# Patient Record
Sex: FEMALE | Race: WHITE | Employment: OTHER | URBAN - NONMETROPOLITAN AREA
[De-identification: names, ages, dates, MRNs, and addresses within clinical notes are randomized per-mention and may not be internally consistent; named-entity substitution may affect disease eponyms.]

---

## 2017-01-13 ENCOUNTER — OFFICE VISIT (OUTPATIENT)
Dept: FAMILY MEDICINE CLINIC | Age: 52
End: 2017-01-13

## 2017-01-13 VITALS
BODY MASS INDEX: 29.78 KG/M2 | SYSTOLIC BLOOD PRESSURE: 122 MMHG | OXYGEN SATURATION: 96 % | WEIGHT: 174.4 LBS | DIASTOLIC BLOOD PRESSURE: 72 MMHG | TEMPERATURE: 97.7 F | HEART RATE: 90 BPM | HEIGHT: 64 IN

## 2017-01-13 DIAGNOSIS — J40 BRONCHITIS: Primary | ICD-10-CM

## 2017-01-13 PROCEDURE — 99213 OFFICE O/P EST LOW 20 MIN: CPT | Performed by: FAMILY MEDICINE

## 2017-01-13 RX ORDER — CEFDINIR 300 MG/1
300 CAPSULE ORAL 2 TIMES DAILY
Qty: 20 CAPSULE | Refills: 0 | Status: SHIPPED | OUTPATIENT
Start: 2017-01-13 | End: 2017-01-23

## 2017-01-13 ASSESSMENT — ENCOUNTER SYMPTOMS
SHORTNESS OF BREATH: 1
COUGH: 0
DIARRHEA: 1
SINUS PRESSURE: 0
SORE THROAT: 0
NAUSEA: 1
VOMITING: 0

## 2017-03-06 ENCOUNTER — HOSPITAL ENCOUNTER (OUTPATIENT)
Dept: MAMMOGRAPHY | Age: 52
Discharge: HOME | End: 2017-03-06
Attending: FAMILY MEDICINE | Admitting: FAMILY MEDICINE

## 2017-03-06 DIAGNOSIS — Z12.31 ENCOUNTER FOR SCREENING MAMMOGRAM FOR MALIGNANT NEOPLASM OF BREAST: ICD-10-CM

## 2017-04-06 DIAGNOSIS — E03.9 HYPOTHYROIDISM: ICD-10-CM

## 2017-04-06 DIAGNOSIS — E78.5 HYPERLIPIDEMIA, UNSPECIFIED HYPERLIPIDEMIA TYPE: Primary | ICD-10-CM

## 2017-04-07 RX ORDER — LEVOTHYROXINE SODIUM 88 UG/1
TABLET ORAL
Qty: 90 TABLET | Refills: 2 | OUTPATIENT
Start: 2017-04-07

## 2017-04-10 ENCOUNTER — NURSE ONLY (OUTPATIENT)
Dept: FAMILY MEDICINE CLINIC | Age: 52
End: 2017-04-10

## 2017-04-10 DIAGNOSIS — Z79.899 CURRENT USE OF ESTROGEN THERAPY: ICD-10-CM

## 2017-04-10 DIAGNOSIS — E03.9 HYPOTHYROIDISM: ICD-10-CM

## 2017-04-10 DIAGNOSIS — E78.5 HYPERLIPIDEMIA, UNSPECIFIED HYPERLIPIDEMIA TYPE: ICD-10-CM

## 2017-04-10 DIAGNOSIS — R53.83 FATIGUE, UNSPECIFIED TYPE: Primary | ICD-10-CM

## 2017-04-10 LAB
A/G RATIO: 1.6 (ref 1.1–2.2)
ALBUMIN SERPL-MCNC: 3.9 G/DL (ref 3.4–5)
ALP BLD-CCNC: 56 U/L (ref 40–129)
ALT SERPL-CCNC: 10 U/L (ref 10–40)
ANION GAP SERPL CALCULATED.3IONS-SCNC: 14 MMOL/L (ref 3–16)
AST SERPL-CCNC: 12 U/L (ref 15–37)
BASOPHILS ABSOLUTE: 0 K/UL (ref 0–0.2)
BASOPHILS RELATIVE PERCENT: 0.6 %
BILIRUB SERPL-MCNC: 0.3 MG/DL (ref 0–1)
BUN BLDV-MCNC: 9 MG/DL (ref 7–20)
CALCIUM SERPL-MCNC: 9.1 MG/DL (ref 8.3–10.6)
CHLORIDE BLD-SCNC: 101 MMOL/L (ref 99–110)
CHOLESTEROL, TOTAL: 236 MG/DL (ref 0–199)
CO2: 23 MMOL/L (ref 21–32)
CREAT SERPL-MCNC: 0.5 MG/DL (ref 0.6–1.1)
EOSINOPHILS ABSOLUTE: 0.1 K/UL (ref 0–0.6)
EOSINOPHILS RELATIVE PERCENT: 1.4 %
GFR AFRICAN AMERICAN: >60
GFR NON-AFRICAN AMERICAN: >60
GLOBULIN: 2.5 G/DL
GLUCOSE BLD-MCNC: 87 MG/DL (ref 70–99)
HCT VFR BLD CALC: 40 % (ref 36–48)
HDLC SERPL-MCNC: 37 MG/DL (ref 40–60)
HEMOGLOBIN: 13.6 G/DL (ref 12–16)
LDL CHOLESTEROL CALCULATED: ABNORMAL MG/DL
LDL CHOLESTEROL DIRECT: 149 MG/DL
LYMPHOCYTES ABSOLUTE: 1.5 K/UL (ref 1–5.1)
LYMPHOCYTES RELATIVE PERCENT: 27.9 %
MCH RBC QN AUTO: 31.8 PG (ref 26–34)
MCHC RBC AUTO-ENTMCNC: 33.9 G/DL (ref 31–36)
MCV RBC AUTO: 93.8 FL (ref 80–100)
MONOCYTES ABSOLUTE: 0.4 K/UL (ref 0–1.3)
MONOCYTES RELATIVE PERCENT: 7.7 %
NEUTROPHILS ABSOLUTE: 3.4 K/UL (ref 1.7–7.7)
NEUTROPHILS RELATIVE PERCENT: 62.4 %
PDW BLD-RTO: 12.2 % (ref 12.4–15.4)
PLATELET # BLD: 169 K/UL (ref 135–450)
PMV BLD AUTO: 10.8 FL (ref 5–10.5)
POTASSIUM SERPL-SCNC: 4.3 MMOL/L (ref 3.5–5.1)
RBC # BLD: 4.27 M/UL (ref 4–5.2)
SODIUM BLD-SCNC: 138 MMOL/L (ref 136–145)
T4 FREE: 1.3 NG/DL (ref 0.9–1.8)
TOTAL PROTEIN: 6.4 G/DL (ref 6.4–8.2)
TRIGL SERPL-MCNC: 362 MG/DL (ref 0–150)
TSH SERPL DL<=0.05 MIU/L-ACNC: 2.59 UIU/ML (ref 0.27–4.2)
VLDLC SERPL CALC-MCNC: ABNORMAL MG/DL
WBC # BLD: 5.5 K/UL (ref 4–11)

## 2017-04-10 PROCEDURE — 36415 COLL VENOUS BLD VENIPUNCTURE: CPT | Performed by: FAMILY MEDICINE

## 2017-04-13 LAB
ESTRADIOL LEVEL: 169 PG/ML
ESTROGEN TOTAL: 955 PG/ML
ESTRONE: 786 PG/ML

## 2017-04-17 ENCOUNTER — OFFICE VISIT (OUTPATIENT)
Dept: FAMILY MEDICINE CLINIC | Age: 52
End: 2017-04-17

## 2017-04-17 VITALS
SYSTOLIC BLOOD PRESSURE: 128 MMHG | BODY MASS INDEX: 29.5 KG/M2 | DIASTOLIC BLOOD PRESSURE: 82 MMHG | OXYGEN SATURATION: 98 % | HEART RATE: 86 BPM | HEIGHT: 64 IN | WEIGHT: 172.8 LBS

## 2017-04-17 DIAGNOSIS — Z00.00 WELL ADULT EXAM: Primary | ICD-10-CM

## 2017-04-17 DIAGNOSIS — R19.7 DIARRHEA, UNSPECIFIED TYPE: ICD-10-CM

## 2017-04-17 DIAGNOSIS — E03.9 HYPOTHYROIDISM, UNSPECIFIED TYPE: ICD-10-CM

## 2017-04-17 DIAGNOSIS — E78.5 HYPERLIPIDEMIA, UNSPECIFIED HYPERLIPIDEMIA TYPE: ICD-10-CM

## 2017-04-17 PROBLEM — E06.3 LYMPHOCYTIC THYROIDITIS: Status: ACTIVE | Noted: 2017-04-17

## 2017-04-17 PROCEDURE — 99396 PREV VISIT EST AGE 40-64: CPT | Performed by: FAMILY MEDICINE

## 2017-04-17 ASSESSMENT — ENCOUNTER SYMPTOMS
SHORTNESS OF BREATH: 0
DIARRHEA: 1

## 2017-05-22 RX ORDER — LEVOTHYROXINE SODIUM 88 UG/1
88 TABLET ORAL DAILY
Qty: 90 TABLET | Refills: 3 | Status: SHIPPED | OUTPATIENT
Start: 2017-05-22 | End: 2018-05-17 | Stop reason: SDUPTHER

## 2017-06-06 ENCOUNTER — OFFICE VISIT (OUTPATIENT)
Dept: FAMILY MEDICINE CLINIC | Age: 52
End: 2017-06-06

## 2017-06-06 VITALS
TEMPERATURE: 98.1 F | HEIGHT: 64 IN | WEIGHT: 172 LBS | RESPIRATION RATE: 16 BRPM | OXYGEN SATURATION: 96 % | DIASTOLIC BLOOD PRESSURE: 70 MMHG | BODY MASS INDEX: 29.37 KG/M2 | HEART RATE: 85 BPM | SYSTOLIC BLOOD PRESSURE: 110 MMHG

## 2017-06-06 DIAGNOSIS — K64.8 HEMORRHOIDS, INTERNAL: ICD-10-CM

## 2017-06-06 DIAGNOSIS — Z01.818 PREOP EXAMINATION: Primary | ICD-10-CM

## 2017-06-06 DIAGNOSIS — K60.2 RECTAL FISSURE: ICD-10-CM

## 2017-06-06 PROCEDURE — 99213 OFFICE O/P EST LOW 20 MIN: CPT | Performed by: FAMILY MEDICINE

## 2017-06-06 ASSESSMENT — ENCOUNTER SYMPTOMS
ABDOMINAL PAIN: 1
SHORTNESS OF BREATH: 0

## 2018-01-15 ENCOUNTER — TELEPHONE (OUTPATIENT)
Dept: FAMILY MEDICINE CLINIC | Age: 53
End: 2018-01-15

## 2018-01-15 RX ORDER — DOXYCYCLINE HYCLATE 100 MG
100 TABLET ORAL 2 TIMES DAILY
Qty: 20 TABLET | Refills: 0 | Status: SHIPPED | OUTPATIENT
Start: 2018-01-15 | End: 2018-05-02 | Stop reason: SDUPTHER

## 2018-01-15 RX ORDER — ATOVAQUONE AND PROGUANIL HYDROCHLORIDE 250; 100 MG/1; MG/1
TABLET, FILM COATED ORAL
Qty: 30 TABLET | Refills: 0 | Status: SHIPPED | OUTPATIENT
Start: 2018-01-15 | End: 2018-05-02 | Stop reason: SDUPTHER

## 2018-01-15 RX ORDER — ONDANSETRON 4 MG/1
4 TABLET, FILM COATED ORAL DAILY PRN
Qty: 20 TABLET | Refills: 0 | Status: SHIPPED | OUTPATIENT
Start: 2018-01-15 | End: 2018-05-02 | Stop reason: SDUPTHER

## 2018-03-08 ENCOUNTER — HOSPITAL ENCOUNTER (OUTPATIENT)
Dept: MAMMOGRAPHY | Age: 53
Discharge: HOME | End: 2018-03-08
Attending: OBSTETRICS & GYNECOLOGY | Admitting: OBSTETRICS & GYNECOLOGY

## 2018-03-08 DIAGNOSIS — Z12.31 VISIT FOR SCREENING MAMMOGRAM: ICD-10-CM

## 2018-05-02 ENCOUNTER — TELEPHONE (OUTPATIENT)
Dept: FAMILY MEDICINE CLINIC | Age: 53
End: 2018-05-02

## 2018-05-02 RX ORDER — DOXYCYCLINE HYCLATE 100 MG
100 TABLET ORAL 2 TIMES DAILY
Qty: 20 TABLET | Refills: 0 | Status: SHIPPED | OUTPATIENT
Start: 2018-05-02 | End: 2018-05-12

## 2018-05-02 RX ORDER — ONDANSETRON 4 MG/1
4 TABLET, FILM COATED ORAL DAILY PRN
Qty: 20 TABLET | Refills: 0 | Status: SHIPPED | OUTPATIENT
Start: 2018-05-02 | End: 2018-08-01

## 2018-05-02 RX ORDER — ATOVAQUONE AND PROGUANIL HYDROCHLORIDE 250; 100 MG/1; MG/1
TABLET, FILM COATED ORAL
Qty: 30 TABLET | Refills: 0 | Status: SHIPPED | OUTPATIENT
Start: 2018-05-02 | End: 2018-10-04 | Stop reason: SDUPTHER

## 2018-05-17 RX ORDER — LEVOTHYROXINE SODIUM 88 UG/1
TABLET ORAL
Qty: 90 TABLET | Refills: 0 | Status: SHIPPED | OUTPATIENT
Start: 2018-05-17 | End: 2018-08-16 | Stop reason: SDUPTHER

## 2018-08-01 ENCOUNTER — OFFICE VISIT (OUTPATIENT)
Dept: FAMILY MEDICINE CLINIC | Age: 53
End: 2018-08-01

## 2018-08-01 VITALS
SYSTOLIC BLOOD PRESSURE: 113 MMHG | TEMPERATURE: 97.1 F | BODY MASS INDEX: 30.01 KG/M2 | OXYGEN SATURATION: 97 % | DIASTOLIC BLOOD PRESSURE: 62 MMHG | HEIGHT: 64 IN | HEART RATE: 83 BPM | WEIGHT: 175.8 LBS
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
## 2018-08-01 DIAGNOSIS — S30.861A TICK BITE OF ABDOMINAL WALL, INITIAL ENCOUNTER: Primary | ICD-10-CM

## 2018-08-01 DIAGNOSIS — R68.89 FLU-LIKE SYMPTOMS: ICD-10-CM

## 2018-08-01 PROCEDURE — 99214 OFFICE O/P EST MOD 30 MIN: CPT | Performed by: NURSE PRACTITIONER

## 2018-08-01 RX ORDER — DOXYCYCLINE HYCLATE 100 MG
100 TABLET ORAL 2 TIMES DAILY
Qty: 20 TABLET | Refills: 0 | Status: SHIPPED | OUTPATIENT
Start: 2018-08-01 | End: 2018-08-11

## 2018-08-01 ASSESSMENT — ENCOUNTER SYMPTOMS
DIARRHEA: 1
CHEST TIGHTNESS: 0
SORE THROAT: 0
SINUS PRESSURE: 0
WHEEZING: 0
NAUSEA: 0
CONSTIPATION: 0
SHORTNESS OF BREATH: 0
VOMITING: 0
ABDOMINAL PAIN: 0
COUGH: 0

## 2018-08-01 ASSESSMENT — PATIENT HEALTH QUESTIONNAIRE - PHQ9
2. FEELING DOWN, DEPRESSED OR HOPELESS: 0
SUM OF ALL RESPONSES TO PHQ9 QUESTIONS 1 & 2: 0
1. LITTLE INTEREST OR PLEASURE IN DOING THINGS: 0
SUM OF ALL RESPONSES TO PHQ QUESTIONS 1-9: 0

## 2018-08-01 NOTE — PATIENT INSTRUCTIONS
Patient Education        Lyme Disease: Care Instructions  Your Care Instructions    Lyme disease is a bacterial infection spread by ticks. Antibiotics can treat Lyme disease. If you do not treat Lyme disease, it can lead to problems with your skin, joints, heart, and nervous system. These problems can develop weeks, months, or even years after you get the infection. Your doctor may prescribe antibiotics even if it is not yet certain that you have Lyme disease. Follow-up care is a key part of your treatment and safety. Be sure to make and go to all appointments, and call your doctor if you are having problems. It's also a good idea to know your test results and keep a list of the medicines you take. How can you care for yourself at home? · Take your antibiotics as directed. Do not stop taking them just because you feel better. You need to take the full course of antibiotics. · Take an over-the-counter pain medicine if needed, such as acetaminophen (Tylenol), ibuprofen (Advil, Motrin), or naproxen (Aleve). Read and follow all instructions on the label. To prevent Lyme disease in the future  · Avoid ticks:  ¨ Learn where ticks are found in your community, and stay away from those areas if possible. ¨ Cover as much of your body as possible when you work or play in grassy or wooded areas. ¨ Use insect repellents, such as products containing DEET. You can spray them on your skin. ¨ Take steps to control ticks on your property if you live in an area where Lyme disease occurs. Clear leaves, brush, tall grasses, woodpiles, and stone fences from around your house and the edges of your yard or garden. This may help get rid of ticks. · When you come in from outdoors, check your body for ticks, including your groin, head, and underarms. The ticks may be about the size of a poppy seed. If no one else can help you check for ticks on your scalp, comb your hair with a fine-tooth comb.   · If you find a tick, remove it quickly. If you can't remove it with your fingers, use tweezers to grasp the tick as close to its mouth (the part in your skin) as possible. Slowly pull the tick straight out-do not twist or yank-until its mouth releases from your skin. · Ticks can come into your house on clothing, outdoor gear, and pets. These ticks can fall off and attach to you. ¨ Check your clothing and outdoor gear. Remove any ticks you find. Then put your clothing in a clothes dryer on high heat for 1 hour to kill any ticks that might remain. ¨ Check your pets for ticks after they have been outdoors. · When hiking in the woods, carry a small dry jar or ziplock bag. If you find a tick on your body, remove the tick and put it in the jar or bag. Store the container in the freezer so you can give it to your doctor if symptoms develop. The tick can be tested to learn whether it is carrying the bacteria that cause Lyme disease. When should you call for help? Call your doctor now or seek immediate medical care if:    · You are confused or cannot think clearly.     · You have a headache or stiff neck.     · You have a new or worse rash.     · You have symptoms of infection, such as:  ¨ Increased pain, swelling, warmth, or redness. ¨ Red streaks leading from the area. ¨ Pus draining from the area. ¨ A fever.    Watch closely for changes in your health, and be sure to contact your doctor if:    · You have new or worse weakness or muscle pain.     · You have new joint pain.     · You do not get better as expected. Where can you learn more? Go to https://[facility] [employer] [facility] [employer]efferyeb.[employer]CareerElite. org and sign in to your [email or web address] account. Enter Y599 in the GuideIT box to learn more about \"Lyme Disease: Care Instructions. \"     If you do not have an account, please click on the \"Sign Up Now\" link. Current as of: November 18, 2017  Content Version: 11.6  © 9575-7192 [employer].  Care instructions adapted under license by [facility]

## 2018-08-01 NOTE — PROGRESS NOTES
[facility]YSICIAN PRACTICES  [place] [address]  Dept: [phone]  Dept Fax: [phone]    Severo Goodnight is a 52 y.o. female who presents today for her medical conditions/complaints as noted below. Severo Goodnight is c/o of Insect Bite (Tick bite x 2 weeks ago. having flu like symptoms) and Generalized Body Aches    Chief Complaint   Patient presents with    Insect Bite     Tick bite x 2 weeks ago. having flu like symptoms    Generalized Body Aches     HPI:     Ms. Vince Watkins presents with a reddened area on her right lower abdomen near the belt line. She began to itch it when she noticed a small tick insect fall out of the reddened area. Roughly 1.5 weeks ago she noticed the onset of flu-like symptoms including fever, chills, achy joints, diarrhea, and general fatigue. She has rested and taking tylenol which have minimally helped her symptoms. The reddened bite site still remains additionally. Symptoms are not worse at any particular time. Subjective:     Review of Systems   Constitutional: Positive for activity change, fatigue and fever. Negative for appetite change and unexpected weight change. HENT: Negative for congestion, sinus pressure and sore throat. Respiratory: Negative for cough, chest tightness, shortness of breath and wheezing. Cardiovascular: Negative for chest pain, palpitations and leg swelling. Gastrointestinal: Positive for diarrhea. Negative for abdominal pain, constipation, nausea and vomiting. Genitourinary: Negative for decreased urine volume, difficulty urinating and frequency. Musculoskeletal: Positive for arthralgias. Negative for myalgias. Skin: Positive for rash. Small area of erythema where she saw a tick fall out around her belt line. Neurological: Negative for dizziness, weakness, light-headedness and numbness.      Objective:     Vitals:    08/01/18 1246   BP: 113/62   Site: Right Arm   Position: Sitting   Cuff Size: Small Adult   Pulse: 83   Temp: 97.1 °F (36.2 °C)   TempSrc: Oral   SpO2: 97%   Weight: 175 lb 12.8 oz (79.7 kg)   Height: 5' 4\" (1.626 m)     Physical Exam   Constitutional: Vital signs are normal. She appears well-developed and well-nourished. HENT:   Head: Normocephalic and atraumatic. Right Ear: Hearing and external ear normal.   Left Ear: Hearing and external ear normal.   Nose: Nose normal.   Mouth/Throat: Oropharynx is clear and moist.   Eyes: Conjunctivae, EOM and lids are normal. Pupils are equal, round, and reactive to light. Lids are everted and swept, no foreign bodies found. Neck: Normal range of motion and full passive range of motion without pain. Neck supple. No JVD present. Cardiovascular: Normal rate, regular rhythm, S1 normal, S2 normal, normal heart sounds, intact distal pulses and normal pulses. No extrasystoles are present. PMI is not displaced. Exam reveals no gallop, no S3, no S4, no distant heart sounds and no friction rub. No murmur heard. No systolic murmur is present    No diastolic murmur is present   Pulses:       Radial pulses are 2+ on the right side, and 2+ on the left side. Dorsalis pedis pulses are 2+ on the right side, and 2+ on the left side. Pulmonary/Chest: Effort normal and breath sounds normal. No accessory muscle usage. No respiratory distress. She has no decreased breath sounds. She has no wheezes. She has no rhonchi. She has no rales. Abdominal: Soft. Normal appearance and bowel sounds are normal. She exhibits no distension. There is no tenderness. There is no rebound and no CVA tenderness. Musculoskeletal: Normal range of motion. Neurological: She is alert. No cranial nerve deficit. Skin: Skin is warm, dry and intact. Rash noted. Psychiatric: She has a normal mood and affect. Her speech is normal.     Assessment & Plan:    The following diagnoses and conditions are stable with no further orders unless indicated:    1. Tick bite of abdominal wall, initial encounter    2. Flu-like symptoms      Cantil Shock was seen today for insect bite and generalized body aches. Diagnoses and all orders for this visit:    Tick bite of abdominal wall, initial encounter  -     doxycycline hyclate (VIBRA-TABS) 100 MG tablet; Take 1 tablet by mouth 2 times daily for 10 days    Flu-like symptoms  -     doxycycline hyclate (VIBRA-TABS) 100 MG tablet; Take 1 tablet by mouth 2 times daily for 10 days    Begin prophylactic coverage for lyme infection with prodrome by deer tick with evolving symptoms beyond initial bite ortiz. Tick was embedded for roughly 1 week as stated by patient. Encouraged rest, increases in hydration/nutrition to build energy. She is to take her antibiotic with food and also begin yogurt or consider a probiotic supplement additionally. Return if symptoms worsen or fail to improve. Patient should call the office immediately with new or ongoing signs or symptoms or worsening, or proceed to the emergency room. If you are on medications which could impair your senses, you are at risk of weakness, falls, dizziness, or drowsiness. You should be careful during activities which could place you at risk of harm, such as climbing, using stairs, operating machinery, or driving vehicles. If you feel you cannot safely do these activities, you should request others to help you, or avoid the activities altogether. If you are drowsy for any other reason, you should use the same precautions as listed above. Call if pattern of symptoms change or persists for an extended time.     SUSAN Stallings

## 2018-08-08 ENCOUNTER — TELEPHONE (OUTPATIENT)
Dept: FAMILY MEDICINE CLINIC | Age: 53
End: 2018-08-08

## 2018-08-08 NOTE — TELEPHONE ENCOUNTER
Attempted to contact patient on 8/8/2018. Result: left message on the patient's voicemail asking patient to return my call. Pre-Visit planning not completed.          Peter Dodson  Patient Services Specialist  [phone]

## 2018-08-16 RX ORDER — LEVOTHYROXINE SODIUM 88 UG/1
TABLET ORAL
Qty: 90 TABLET | Refills: 3 | Status: SHIPPED | OUTPATIENT
Start: 2018-08-16 | End: 2019-03-26 | Stop reason: SDUPTHER

## 2018-08-17 ENCOUNTER — NURSE ONLY (OUTPATIENT)
Dept: FAMILY MEDICINE CLINIC | Age: 53
End: 2018-08-17

## 2018-08-17 ENCOUNTER — TELEPHONE (OUTPATIENT)
Dept: FAMILY MEDICINE CLINIC | Age: 53
End: 2018-08-17

## 2018-08-17 DIAGNOSIS — E78.5 HYPERLIPIDEMIA, UNSPECIFIED HYPERLIPIDEMIA TYPE: ICD-10-CM

## 2018-08-17 DIAGNOSIS — E03.9 HYPOTHYROIDISM, UNSPECIFIED TYPE: Primary | ICD-10-CM

## 2018-08-17 DIAGNOSIS — E06.3 LYMPHOCYTIC THYROIDITIS: ICD-10-CM

## 2018-08-17 LAB
A/G RATIO: 1.6 (ref 1.1–2.2)
ALBUMIN SERPL-MCNC: 4.2 G/DL (ref 3.4–5)
ALP BLD-CCNC: 72 U/L (ref 40–129)
ALT SERPL-CCNC: 8 U/L (ref 10–40)
ANION GAP SERPL CALCULATED.3IONS-SCNC: 13 MMOL/L (ref 3–16)
AST SERPL-CCNC: 11 U/L (ref 15–37)
BILIRUB SERPL-MCNC: 0.6 MG/DL (ref 0–1)
BUN BLDV-MCNC: 9 MG/DL (ref 7–20)
CALCIUM SERPL-MCNC: 9.2 MG/DL (ref 8.3–10.6)
CHLORIDE BLD-SCNC: 101 MMOL/L (ref 99–110)
CHOLESTEROL, TOTAL: 240 MG/DL (ref 0–199)
CO2: 26 MMOL/L (ref 21–32)
CREAT SERPL-MCNC: 0.5 MG/DL (ref 0.6–1.1)
GFR AFRICAN AMERICAN: >60
GFR NON-AFRICAN AMERICAN: >60
GLOBULIN: 2.6 G/DL
GLUCOSE BLD-MCNC: 88 MG/DL (ref 70–99)
HDLC SERPL-MCNC: 38 MG/DL (ref 40–60)
LDL CHOLESTEROL CALCULATED: ABNORMAL MG/DL
LDL CHOLESTEROL DIRECT: 168 MG/DL
POTASSIUM SERPL-SCNC: 4.1 MMOL/L (ref 3.5–5.1)
SODIUM BLD-SCNC: 140 MMOL/L (ref 136–145)
T4 FREE: 1.4 NG/DL (ref 0.9–1.8)
TOTAL PROTEIN: 6.8 G/DL (ref 6.4–8.2)
TRIGL SERPL-MCNC: 340 MG/DL (ref 0–150)
TSH SERPL DL<=0.05 MIU/L-ACNC: 2.09 UIU/ML (ref 0.27–4.2)
VLDLC SERPL CALC-MCNC: ABNORMAL MG/DL

## 2018-08-17 PROCEDURE — 36415 COLL VENOUS BLD VENIPUNCTURE: CPT | Performed by: FAMILY MEDICINE

## 2018-08-17 NOTE — PROGRESS NOTES
Blood drawn per order. Needle size: 23 g  Site: L Basilic. First attempt successful Yes    Second attempt no    Pressure applied until bleeding stopped. Yes applied. Patient informed to call office or return if bleeding reoccurs and unable to stop.     Tubes drawn: 1 purple     1 red

## 2018-08-22 ENCOUNTER — OFFICE VISIT (OUTPATIENT)
Dept: FAMILY MEDICINE CLINIC | Age: 53
End: 2018-08-22

## 2018-08-22 VITALS
DIASTOLIC BLOOD PRESSURE: 80 MMHG | OXYGEN SATURATION: 96 % | HEIGHT: 64 IN | SYSTOLIC BLOOD PRESSURE: 108 MMHG | BODY MASS INDEX: 30.01 KG/M2 | WEIGHT: 175.8 LBS | HEART RATE: 82 BPM

## 2018-08-22 DIAGNOSIS — Z00.00 WELL ADULT EXAM: Primary | ICD-10-CM

## 2018-08-22 DIAGNOSIS — E78.5 HYPERLIPIDEMIA, UNSPECIFIED HYPERLIPIDEMIA TYPE: ICD-10-CM

## 2018-08-22 DIAGNOSIS — E03.9 HYPOTHYROIDISM, UNSPECIFIED TYPE: ICD-10-CM

## 2018-08-22 DIAGNOSIS — K52.832 LYMPHOCYTIC COLITIS: ICD-10-CM

## 2018-08-22 PROCEDURE — 99396 PREV VISIT EST AGE 40-64: CPT | Performed by: FAMILY MEDICINE

## 2018-08-22 RX ORDER — ESTRADIOL 1 MG/1
1 TABLET ORAL DAILY
COMMUNITY

## 2018-08-22 RX ORDER — HYDROXYZINE PAMOATE 25 MG/1
25 CAPSULE ORAL 3 TIMES DAILY PRN
Qty: 30 CAPSULE | Refills: 0 | Status: SHIPPED | OUTPATIENT
Start: 2018-08-22 | End: 2018-09-05

## 2018-08-22 ASSESSMENT — ENCOUNTER SYMPTOMS
ABDOMINAL PAIN: 1
ANAL BLEEDING: 0
DIARRHEA: 1
SHORTNESS OF BREATH: 0

## 2018-08-22 NOTE — PROGRESS NOTES
Chief Complaint   Patient presents with    Annual Exam     Pt here for annual exam.       HPI:  Jerome Grace is a 52 y.o. (: 1965) here today   for annual exam.  Seen for tick bite approx 3 weeks ago. Given doxycycline at that time. Overall improved symptoms. Ongoing issues w/ colon. Has been having more issues over past 2 mo. Told she has colitis. Prior bx c/w lymphocytic colitis. Ongoing issues. Issues w/ diarrhea. Has been on medication and fiber supplement to add bulk to the stools. Rectal itch noted as well. Wakes her up at night. Was given cream as well. Told in the past that diet could affect. Has tried probiotic in the past.  Seems aggravated by [facility] as well. Recent labs. Has not been on chol med. Cannot gadiel   HPI    Patient's medications, allergies, past medical, surgical, social and family histories were reviewed and updated as appropriate. ROS:  Review of Systems   Constitutional: Negative for fever. Respiratory: Negative for shortness of breath. Cardiovascular: Negative for chest pain. Gastrointestinal: Positive for abdominal pain and diarrhea. Negative for anal bleeding (itch noted). LDL Calculated (mg/dL)   Date Value   2018 see below       Past Medical History:   Diagnosis Date    Hyperlipidemia     Hypothyroidism     Thyroid disease        Family History   Problem Relation Age of Onset    Heart Disease Father     High Blood Pressure Mother     High Cholesterol Mother     Kidney Disease Mother         renal polycystic     Arthritis Mother     Early Death Paternal Aunt        Social History     Social History    Marital status:      Spouse name: N/A    Number of children: N/A    Years of education: N/A     Occupational History    Not on file.      Social History Main Topics    Smoking status: Former Smoker     Packs/day: 1.00     Quit date: 1991    Smokeless tobacco: Never Used    Alcohol use No    Drug use: No   

## 2018-10-04 ENCOUNTER — TELEPHONE (OUTPATIENT)
Dept: FAMILY MEDICINE CLINIC | Age: 53
End: 2018-10-04

## 2018-10-04 RX ORDER — ATOVAQUONE AND PROGUANIL HYDROCHLORIDE 250; 100 MG/1; MG/1
TABLET, FILM COATED ORAL
Qty: 30 TABLET | Refills: 0 | Status: SHIPPED | OUTPATIENT
Start: 2018-10-04 | End: 2018-12-04 | Stop reason: ALTCHOICE

## 2018-12-04 ENCOUNTER — OFFICE VISIT (OUTPATIENT)
Dept: FAMILY MEDICINE CLINIC | Age: 53
End: 2018-12-04
Payer: COMMERCIAL

## 2018-12-04 VITALS
HEIGHT: 64 IN | DIASTOLIC BLOOD PRESSURE: 80 MMHG | SYSTOLIC BLOOD PRESSURE: 122 MMHG | OXYGEN SATURATION: 97 % | BODY MASS INDEX: 29.6 KG/M2 | HEART RATE: 79 BPM | WEIGHT: 173.4 LBS | TEMPERATURE: 99.1 F

## 2018-12-04 DIAGNOSIS — H65.93 MIDDLE EAR EFFUSION, BILATERAL: ICD-10-CM

## 2018-12-04 DIAGNOSIS — J30.89 SEASONAL ALLERGIC RHINITIS DUE TO OTHER ALLERGIC TRIGGER: ICD-10-CM

## 2018-12-04 DIAGNOSIS — J02.9 SORE THROAT: Primary | ICD-10-CM

## 2018-12-04 LAB — S PYO AG THROAT QL: NORMAL

## 2018-12-04 PROCEDURE — 99213 OFFICE O/P EST LOW 20 MIN: CPT | Performed by: NURSE PRACTITIONER

## 2018-12-04 PROCEDURE — 87880 STREP A ASSAY W/OPTIC: CPT | Performed by: NURSE PRACTITIONER

## 2018-12-04 RX ORDER — METHYLPREDNISOLONE 4 MG/1
TABLET ORAL
Qty: 1 KIT | Refills: 0 | Status: ON HOLD | OUTPATIENT
Start: 2018-12-04 | End: 2018-12-09 | Stop reason: HOSPADM

## 2018-12-04 RX ORDER — FLUTICASONE PROPIONATE 50 MCG
2 SPRAY, SUSPENSION (ML) NASAL DAILY
Qty: 1 BOTTLE | Refills: 1 | Status: ON HOLD | OUTPATIENT
Start: 2018-12-04 | End: 2018-12-09 | Stop reason: HOSPADM

## 2018-12-04 RX ORDER — CETIRIZINE HYDROCHLORIDE 10 MG/1
10 TABLET ORAL DAILY
Qty: 90 TABLET | Refills: 1 | Status: SHIPPED | OUTPATIENT
Start: 2018-12-04 | End: 2019-03-27

## 2018-12-04 ASSESSMENT — ENCOUNTER SYMPTOMS
EYE REDNESS: 0
BLOOD IN STOOL: 0
RHINORRHEA: 1
NAUSEA: 1
COLOR CHANGE: 0
STRIDOR: 0
CONSTIPATION: 0
COUGH: 0
SINUS PAIN: 0
SHORTNESS OF BREATH: 0
BACK PAIN: 0
CHOKING: 0
CHEST TIGHTNESS: 0
VOMITING: 0
EYE DISCHARGE: 0
SORE THROAT: 1
DIARRHEA: 0
EYE ITCHING: 0
SINUS PRESSURE: 1
TROUBLE SWALLOWING: 1
ABDOMINAL PAIN: 0
PHOTOPHOBIA: 0
EYE PAIN: 0
WHEEZING: 0
VOICE CHANGE: 1

## 2018-12-07 ENCOUNTER — OFFICE VISIT (OUTPATIENT)
Dept: FAMILY MEDICINE CLINIC | Age: 53
End: 2018-12-07
Payer: COMMERCIAL

## 2018-12-07 VITALS
DIASTOLIC BLOOD PRESSURE: 94 MMHG | BODY MASS INDEX: 30.09 KG/M2 | SYSTOLIC BLOOD PRESSURE: 139 MMHG | WEIGHT: 175.4 LBS | OXYGEN SATURATION: 96 % | HEART RATE: 66 BPM | TEMPERATURE: 98.4 F

## 2018-12-07 DIAGNOSIS — J01.40 ACUTE NON-RECURRENT PANSINUSITIS: ICD-10-CM

## 2018-12-07 DIAGNOSIS — J02.9 SORE THROAT: Primary | ICD-10-CM

## 2018-12-07 PROCEDURE — 96372 THER/PROPH/DIAG INJ SC/IM: CPT | Performed by: NURSE PRACTITIONER

## 2018-12-07 PROCEDURE — 99213 OFFICE O/P EST LOW 20 MIN: CPT | Performed by: NURSE PRACTITIONER

## 2018-12-07 RX ORDER — DOXYCYCLINE HYCLATE 100 MG
100 TABLET ORAL 2 TIMES DAILY
Qty: 20 TABLET | Refills: 0 | Status: ON HOLD | OUTPATIENT
Start: 2018-12-07 | End: 2018-12-09 | Stop reason: HOSPADM

## 2018-12-07 RX ORDER — METHYLPREDNISOLONE SODIUM SUCCINATE 40 MG/ML
40 INJECTION, POWDER, LYOPHILIZED, FOR SOLUTION INTRAMUSCULAR; INTRAVENOUS ONCE
Status: COMPLETED | OUTPATIENT
Start: 2018-12-07 | End: 2018-12-07

## 2018-12-07 RX ADMIN — METHYLPREDNISOLONE SODIUM SUCCINATE 40 MG: 40 INJECTION, POWDER, LYOPHILIZED, FOR SOLUTION INTRAMUSCULAR; INTRAVENOUS at 10:48

## 2018-12-07 ASSESSMENT — ENCOUNTER SYMPTOMS
COUGH: 1
EYES NEGATIVE: 1
SORE THROAT: 1

## 2018-12-07 NOTE — PATIENT INSTRUCTIONS
Please read the healthy family handout that you were given and share it with your family. Please compare this printed medication list with your medications at home to be sure they are the same. If you have any medications that are different please contact us immediately at [phone]. Also review your allergies that we have listed, these may also include medications that you have not been able to tolerate, make sure everything listed is correct. If you have any allergies that are different please contact us immediately at [phone]. Patient Education     Drink plenty of fluids, take all doses of antibiotics and Patient to f/u if no better or worsening of symptoms. Sore Throat: Care Instructions  Your Care Instructions    Infection by bacteria or a virus causes most sore throats. Cigarette smoke, dry air, air pollution, allergies, and yelling can also cause a sore throat. Sore throats can be painful and annoying. Fortunately, most sore throats go away on their own. If you have a bacterial infection, your doctor may prescribe antibiotics. Follow-up care is a key part of your treatment and safety. Be sure to make and go to all appointments, and call your doctor if you are having problems. It's also a good idea to know your test results and keep a list of the medicines you take. How can you care for yourself at home? · If your doctor prescribed antibiotics, take them as directed. Do not stop taking them just because you feel better. You need to take the full course of antibiotics. · Gargle with warm salt water once an hour to help reduce swelling and relieve discomfort. Use 1 teaspoon of salt mixed in 1 cup of warm water. · Take an over-the-counter pain medicine, such as acetaminophen (Tylenol), ibuprofen (Advil, Motrin), or naproxen (Aleve). Read and follow all instructions on the label. · Be careful when taking over-the-counter cold or flu medicines and Tylenol at the same time.  Many of these days in a row. Using it for more than 3 days can make your congestion worse. When should you call for help? Call your doctor now or seek immediate medical care if:    · You have new or worse swelling or redness in your face or around your eyes.     · You have a new or higher fever.    Watch closely for changes in your health, and be sure to contact your doctor if:    · You have new or worse facial pain.     · The mucus from your nose becomes thicker (like pus) or has new blood in it.     · You are not getting better as expected. Where can you learn more? Go to https://Pickiepepiceweb.Alloy Digital. org and sign in to your "[employer] account. Enter Z867 in the [place] Coverage box to learn more about \"Sinusitis: Care Instructions. \"     If you do not have an account, please click on the \"Sign Up Now\" link. Current as of: March 28, 2018  Content Version: 11.8  © 6814-6387 [employer]. Care instructions adapted under license by [facility] ([facility]). If you have questions about a medical condition or this instruction, always ask your healthcare professional. [address] any warranty or liability for your use of this information. Patient Education          doxycycline  Pronunciation:  DOX anh griffin  Brand:  Acticlate, Adoxa, Alodox, Avidoxy, Doryx, Mondoxyne NL, Monodox, Morgidox, Oracea, Oraxyl, Targadox, Vibramycin  What is the most important information I should know about doxycycline? You should not take this medicine if you are allergic to any tetracycline antibiotic. Children younger than 6years old should use doxycycline only in cases of severe or life-threatening conditions. This medicine can cause permanent yellowing or graying of the teeth in children  Using doxycycline during pregnancy could harm the unborn baby or cause permanent tooth discoloration later in the baby's life. What is doxycycline?   Doxycycline is a tetracycline antibiotic that fights bacteria years old. Children should use doxycycline only in cases of severe or life-threatening conditions such as anthrax or [facility] spotted fever. The benefit of treating a serious condition may outweigh any risks to the child's tooth development. How should I take doxycycline? Follow all directions on your prescription label and read all medication guides or instruction sheets. Use the medicine exactly as directed. Take doxycycline with a full glass of water. Drink plenty of liquids while you are taking doxycycline. Read and carefully follow any Instructions for Use provided with your medicine. Ask your doctor or pharmacist if you do not understand these instructions. Most brands of doxycyline may be taken with food or milk if the medicine upsets your stomach. Different brands of doxycycline may have different instructions about taking them with or without food. Take Oracea on an empty stomach, at least 1 hour before or 2 hours after a meal.  You may need to split a doxycycline tablet to get the correct dose. Follow your doctor's instructions. Swallow a delayed-release capsule or tablet whole. Do not crush, chew, break, or open it. Measure liquid medicine  with the dosing syringe provided, or with a special dose-measuring spoon or medicine cup. If you do not have a dose-measuring device, ask your pharmacist for one. If you take doxycycline to prevent malaria: Start taking the medicine 1 or 2 days before entering an area where malaria is common. Continue taking the medicine every day during your stay and for at least 4 weeks after you leave the area. Use this medicine for the full prescribed length of time, even if your symptoms quickly improve. Skipping doses can increase your risk of infection that is resistant to medication. Doxycycline will not treat a viral infection such as the flu or a common cold. Store at room temperature away from moisture, heat, and light.   Throw away any unused medicine after the expiration date on the label has passed. Using  doxycycline can cause damage to your kidneys. What happens if I miss a dose? Take the medicine as soon as you can, but skip the missed dose if it is almost time for your next dose. Do not take two doses at one time. What happens if I overdose? Seek emergency medical attention or call the Poison Help line at 1-120.786.6303. What should I avoid while taking doxycycline? Do not take iron supplements, multivitamins, calcium supplements, antacids, or laxatives within 2 hours before or after taking doxycycline. Avoid taking any other antibiotics with doxycycline unless your doctor has told you to. Doxycycline could make you sunburn more easily. Avoid sunlight or tanning beds. Wear protective clothing and use sunscreen (SPF 30 or higher) when you are outdoors. Antibiotic medicines can cause diarrhea, which may be a sign of a new infection. If you have diarrhea that is watery or bloody, call your doctor. Do not use anti-diarrhea medicine unless your doctor tells you to. What are the possible side effects of doxycycline? Get emergency medical help if you have signs of an allergic reaction (hives, difficult breathing, swelling in your face or throat) or a severe skin reaction (fever, sore throat, burning in your eyes, skin pain, red or purple skin rash that spreads and causes blistering and peeling). Seek medical treatment if you have a serious drug reaction that can affect many parts of your body. Symptoms may include: skin rash, fever, swollen glands, flu-like symptoms, muscle aches, severe weakness, unusual bruising, or yellowing of your skin or eyes. This reaction may occur several weeks after you began using doxycycline.   Call your doctor at once if you have:  · severe stomach pain, diarrhea that is watery or bloody;  · throat irritation, trouble swallowing;  · chest pain, irregular heart rhythm, feeling short of breath;  · little or no warrant that uses outside of the [place] are appropriate, unless specifically indicated otherwise. [facility] drug information does not endorse drugs, diagnose patients or recommend therapy. [employer] drug information is an informational resource designed to assist licensed healthcare practitioners in caring for their patients and/or to serve consumers viewing this service as a supplement to, and not a substitute for, the expertise, skill, knowledge and judgment of healthcare practitioners. The absence of a warning for a given drug or drug combination in no way should be construed to indicate that the drug or drug combination is safe, effective or appropriate for any given patient. [facility] does not assume any responsibility for any aspect of healthcare administered with the aid of information [facility] provides. The information contained herein is not intended to cover all possible uses, directions, precautions, warnings, drug interactions, allergic reactions, or adverse effects. If you have questions about the drugs you are taking, check with your doctor, nurse or pharmacist.  Copyright 1231-6873 [address]: .02. Revision date: 4/11/2018. Care instructions adapted under license by [facility] ([facility]). If you have questions about a medical condition or this instruction, always ask your healthcare professional. [place] [address] any warranty or liability for your use of this information.

## 2018-12-08 ENCOUNTER — APPOINTMENT (OUTPATIENT)
Dept: CT IMAGING | Age: 53
End: 2018-12-08
Payer: COMMERCIAL

## 2018-12-08 ENCOUNTER — HOSPITAL ENCOUNTER (EMERGENCY)
Age: 53
Discharge: HOME | End: 2018-12-08
Attending: EMERGENCY MEDICINE
Payer: COMMERCIAL

## 2018-12-08 ENCOUNTER — HOSPITAL ENCOUNTER (INPATIENT)
Age: 53
LOS: 1 days | Discharge: HOME | DRG: 153 | End: 2018-12-09
Attending: EMERGENCY MEDICINE | Admitting: INTERNAL MEDICINE
Payer: COMMERCIAL

## 2018-12-08 ENCOUNTER — APPOINTMENT (OUTPATIENT)
Dept: GENERAL RADIOLOGY | Age: 53
End: 2018-12-08
Payer: COMMERCIAL

## 2018-12-08 VITALS
RESPIRATION RATE: 20 BRPM | BODY MASS INDEX: 29.53 KG/M2 | OXYGEN SATURATION: 98 % | HEART RATE: 79 BPM | SYSTOLIC BLOOD PRESSURE: 142 MMHG | TEMPERATURE: 98.4 F | HEIGHT: 64 IN | WEIGHT: 173 LBS | DIASTOLIC BLOOD PRESSURE: 88 MMHG

## 2018-12-08 DIAGNOSIS — J36 TONSILLAR ABSCESS: Primary | ICD-10-CM

## 2018-12-08 DIAGNOSIS — Z79.1 PATIENT TAKES NSAID (NON-STEROID ANTI-INFLAMMATORY DRUG): ICD-10-CM

## 2018-12-08 DIAGNOSIS — E07.9 THYROID MASS: ICD-10-CM

## 2018-12-08 DIAGNOSIS — J36 TONSIL, ABSCESS: Primary | ICD-10-CM

## 2018-12-08 DIAGNOSIS — J36 TONSILLAR ABSCESS: ICD-10-CM

## 2018-12-08 LAB
A/G RATIO: 1.6 (ref 1.1–2.2)
ALBUMIN SERPL-MCNC: 3.9 G/DL (ref 3.4–5)
ALP BLD-CCNC: 61 U/L (ref 40–129)
ALT SERPL-CCNC: 11 U/L (ref 10–40)
ANION GAP SERPL CALCULATED.3IONS-SCNC: 14 MMOL/L (ref 3–16)
AST SERPL-CCNC: 10 U/L (ref 15–37)
BILIRUB SERPL-MCNC: <0.2 MG/DL (ref 0–1)
BUN BLDV-MCNC: 8 MG/DL (ref 7–20)
CALCIUM SERPL-MCNC: 8.8 MG/DL (ref 8.3–10.6)
CHLORIDE BLD-SCNC: 101 MMOL/L (ref 99–110)
CO2: 26 MMOL/L (ref 21–32)
CREAT SERPL-MCNC: <0.5 MG/DL (ref 0.6–1.1)
GFR AFRICAN AMERICAN: >60
GFR NON-AFRICAN AMERICAN: >60
GLOBULIN: 2.5 G/DL
GLUCOSE BLD-MCNC: 86 MG/DL (ref 70–99)
HCT VFR BLD CALC: 40.2 % (ref 36–48)
HEMOGLOBIN: 13.8 G/DL (ref 12–16)
MCH RBC QN AUTO: 32 PG (ref 26–34)
MCHC RBC AUTO-ENTMCNC: 34.3 G/DL (ref 31–36)
MCV RBC AUTO: 93.4 FL (ref 80–100)
PDW BLD-RTO: 12.3 % (ref 12.4–15.4)
PLATELET # BLD: 161 K/UL (ref 135–450)
PMV BLD AUTO: 9.4 FL (ref 5–10.5)
POTASSIUM SERPL-SCNC: 3.3 MMOL/L (ref 3.5–5.1)
RAPID INFLUENZA  B AGN: NEGATIVE
RAPID INFLUENZA A AGN: NEGATIVE
RBC # BLD: 4.31 M/UL (ref 4–5.2)
S PYO AG THROAT QL: NEGATIVE
SODIUM BLD-SCNC: 141 MMOL/L (ref 136–145)
TOTAL PROTEIN: 6.4 G/DL (ref 6.4–8.2)
WBC # BLD: 5.9 K/UL (ref 4–11)

## 2018-12-08 PROCEDURE — 6360000004 HC RX CONTRAST MEDICATION: Performed by: EMERGENCY MEDICINE

## 2018-12-08 PROCEDURE — 70491 CT SOFT TISSUE NECK W/DYE: CPT

## 2018-12-08 PROCEDURE — 87804 INFLUENZA ASSAY W/OPTIC: CPT

## 2018-12-08 PROCEDURE — 99285 EMERGENCY DEPT VISIT HI MDM: CPT

## 2018-12-08 PROCEDURE — 93005 ELECTROCARDIOGRAM TRACING: CPT | Performed by: STUDENT IN AN ORGANIZED HEALTH CARE EDUCATION/TRAINING PROGRAM

## 2018-12-08 PROCEDURE — 87880 STREP A ASSAY W/OPTIC: CPT

## 2018-12-08 PROCEDURE — 87081 CULTURE SCREEN ONLY: CPT

## 2018-12-08 PROCEDURE — 6370000000 HC RX 637 (ALT 250 FOR IP): Performed by: NURSE PRACTITIONER

## 2018-12-08 PROCEDURE — 70360 X-RAY EXAM OF NECK: CPT

## 2018-12-08 PROCEDURE — 99283 EMERGENCY DEPT VISIT LOW MDM: CPT

## 2018-12-08 PROCEDURE — 6360000002 HC RX W HCPCS: Performed by: NURSE PRACTITIONER

## 2018-12-08 PROCEDURE — 85027 COMPLETE CBC AUTOMATED: CPT

## 2018-12-08 PROCEDURE — 96374 THER/PROPH/DIAG INJ IV PUSH: CPT

## 2018-12-08 PROCEDURE — 80053 COMPREHEN METABOLIC PANEL: CPT

## 2018-12-08 RX ORDER — KETOROLAC TROMETHAMINE 30 MG/ML
30 INJECTION, SOLUTION INTRAMUSCULAR; INTRAVENOUS ONCE
Status: COMPLETED | OUTPATIENT
Start: 2018-12-08 | End: 2018-12-09

## 2018-12-08 RX ORDER — ONDANSETRON 2 MG/ML
4 INJECTION INTRAMUSCULAR; INTRAVENOUS ONCE
Status: COMPLETED | OUTPATIENT
Start: 2018-12-08 | End: 2018-12-09

## 2018-12-08 RX ORDER — AMOXICILLIN AND CLAVULANATE POTASSIUM 875; 125 MG/1; MG/1
1 TABLET, FILM COATED ORAL ONCE
Status: COMPLETED | OUTPATIENT
Start: 2018-12-08 | End: 2018-12-08

## 2018-12-08 RX ORDER — DEXAMETHASONE SODIUM PHOSPHATE 4 MG/ML
10 INJECTION, SOLUTION INTRA-ARTICULAR; INTRALESIONAL; INTRAMUSCULAR; INTRAVENOUS; SOFT TISSUE EVERY 6 HOURS
Status: DISCONTINUED | OUTPATIENT
Start: 2018-12-08 | End: 2018-12-09

## 2018-12-08 RX ORDER — HYDROCODONE BITARTRATE AND ACETAMINOPHEN 5; 325 MG/1; MG/1
1-2 TABLET ORAL EVERY 6 HOURS PRN
Qty: 10 TABLET | Refills: 0 | Status: ON HOLD | OUTPATIENT
Start: 2018-12-08 | End: 2018-12-09

## 2018-12-08 RX ORDER — SODIUM CHLORIDE 9 MG/ML
INJECTION, SOLUTION INTRAVENOUS CONTINUOUS
Status: DISPENSED | OUTPATIENT
Start: 2018-12-08 | End: 2018-12-09

## 2018-12-08 RX ORDER — KETOROLAC TROMETHAMINE 30 MG/ML
30 INJECTION, SOLUTION INTRAMUSCULAR; INTRAVENOUS ONCE
Status: COMPLETED | OUTPATIENT
Start: 2018-12-08 | End: 2018-12-08

## 2018-12-08 RX ORDER — AMOXICILLIN AND CLAVULANATE POTASSIUM 875; 125 MG/1; MG/1
1 TABLET, FILM COATED ORAL 2 TIMES DAILY
Qty: 20 TABLET | Refills: 0 | Status: ON HOLD | OUTPATIENT
Start: 2018-12-08 | End: 2018-12-09 | Stop reason: HOSPADM

## 2018-12-08 RX ORDER — HYDROCODONE BITARTRATE AND ACETAMINOPHEN 5; 325 MG/1; MG/1
1 TABLET ORAL ONCE
Status: COMPLETED | OUTPATIENT
Start: 2018-12-08 | End: 2018-12-08

## 2018-12-08 RX ADMIN — AMOXICILLIN AND CLAVULANATE POTASSIUM 1 TABLET: 875; 125 TABLET, FILM COATED ORAL at 11:27

## 2018-12-08 RX ADMIN — HYDROCODONE BITARTRATE AND ACETAMINOPHEN 1 TABLET: 5; 325 TABLET ORAL at 11:27

## 2018-12-08 RX ADMIN — IOPAMIDOL 75 ML: 755 INJECTION, SOLUTION INTRAVENOUS at 09:52

## 2018-12-08 RX ADMIN — KETOROLAC TROMETHAMINE 30 MG: 30 INJECTION, SOLUTION INTRAMUSCULAR at 08:27

## 2018-12-08 ASSESSMENT — ENCOUNTER SYMPTOMS
SHORTNESS OF BREATH: 0
EYE ITCHING: 0
COUGH: 1
DIARRHEA: 0
COLOR CHANGE: 0
SORE THROAT: 1
EYE DISCHARGE: 0
EYES NEGATIVE: 1
WHEEZING: 0
VOMITING: 0
CHEST TIGHTNESS: 1
ABDOMINAL PAIN: 0
FACIAL SWELLING: 0
COUGH: 1
STRIDOR: 0
NAUSEA: 0
SHORTNESS OF BREATH: 0
BACK PAIN: 0
ALLERGIC/IMMUNOLOGIC NEGATIVE: 1

## 2018-12-08 ASSESSMENT — PAIN DESCRIPTION - LOCATION
LOCATION: THROAT
LOCATION: THROAT

## 2018-12-08 ASSESSMENT — PAIN DESCRIPTION - PAIN TYPE
TYPE: ACUTE PAIN
TYPE: ACUTE PAIN

## 2018-12-08 ASSESSMENT — PAIN SCALES - GENERAL
PAINLEVEL_OUTOF10: 7
PAINLEVEL_OUTOF10: 10
PAINLEVEL_OUTOF10: 10

## 2018-12-08 ASSESSMENT — PAIN DESCRIPTION - FREQUENCY: FREQUENCY: CONTINUOUS

## 2018-12-08 ASSESSMENT — PAIN DESCRIPTION - ORIENTATION: ORIENTATION: LEFT

## 2018-12-08 ASSESSMENT — PAIN DESCRIPTION - DESCRIPTORS: DESCRIPTORS: SHARP;RADIATING

## 2018-12-08 NOTE — ED PROVIDER NOTES
Allergic/Immunologic: Negative. Neurological: Negative for dizziness and headaches. Hematological: Negative. Psychiatric/Behavioral: Negative. Positives and Pertinent negatives as per HPI. Except as noted abovein the ROS, all other systems were reviewed and negative. PAST MEDICAL HISTORY     Past Medical History:   Diagnosis Date    Hyperlipidemia     Hypothyroidism     Thyroid disease          SURGICAL HISTORY     Past Surgical History:   Procedure Laterality Date    ABDOMEN SURGERY       SECTION      CHOLECYSTECTOMY, LAPAROSCOPIC  1/10/11    COLONOSCOPY  12    HERNIA REPAIR      HYSTERECTOMY           CURRENTMEDICATIONS       Previous Medications    ASCORBIC ACID (VITAMIN C) 500 MG TABLET    Take 500 mg by mouth daily. CETIRIZINE (ZYRTEC) 10 MG TABLET    Take 1 tablet by mouth daily    CHOLECALCIFEROL (VITAMIN D3 PO)    Take by mouth    DOXYCYCLINE HYCLATE (VIBRA-TABS) 100 MG TABLET    Take 1 tablet by mouth 2 times daily for 10 days    ESTRADIOL (ESTRACE) 1 MG TABLET    Take 1 mg by mouth daily    FLUTICASONE (FLONASE) 50 MCG/ACT NASAL SPRAY    2 sprays by Nasal route daily    LEVOTHYROXINE (SYNTHROID) 88 MCG TABLET    TAKE 1 TABLET DAILY (WILL NEED TO MAKE APPOINTMENT BEFORE ANY FUTURE REFILLS)    METHYLPREDNISOLONE (MEDROL DOSEPACK) 4 MG TABLET    Take by mouth. ALLERGIES     Patient has no known allergies.     FAMILYHISTORY       Family History   Problem Relation Age of Onset    Heart Disease Father     High Blood Pressure Mother     High Cholesterol Mother     Kidney Disease Mother         renal polycystic     Arthritis Mother     Early Death Paternal Aunt           SOCIAL HISTORY       Social History     Social History    Marital status:      Spouse name: N/A    Number of children: N/A    Years of education: N/A     Social History Main Topics    Smoking status: Former Smoker     Packs/day: 1.00     Years: 15.00     Quit date: 1991 Performed at:  [address], [facility] [employer]   Phone [phone]   COMPREHENSIVE METABOLIC PANEL - Abnormal; Notable for the following:     Potassium 3.3 (*)     CREATININE <0.5 (*)     AST 10 (*)     All other components within normal limits    Narrative:     Performed at:  [facility]) [address], [facility] [employer]   Phone [phone]   RAPID INFLUENZA A/B ANTIGENS    Narrative:     Performed at:  [address], [facility] [employer]   Phone [phone] A THROAT    Narrative:     Performed at:  [place] [address] [employer]   Phone [phone]   CULTURE BETA STREP CONFIRM PLATE       All other labs were within normal range or not returned as of this dictation. EKG: All EKG's are interpreted by the Emergency Department Physician who either signs orCo-signs this chart in the absence of a cardiologist.  Please see their note for interpretation of EKG. RADIOLOGY:   Non-plain film images such as CT, Ultrasound and MRI are read by the radiologist. Plain radiographic images are visualized andpreliminarily interpreted by the  ED Provider with the below findings:        Interpretation perthe Radiologist below, if available at the time of this note:    CT SOFT TISSUE NECK W CONTRAST   Final Result   1. Metallic artifact from prior dental procedures limits visualization of   peritonsillar location bilaterally. However, there is fullness of soft   tissue extending from level of left oropharynx inferiorly to level of left   piriform sinus. Left palatine tonsil is not optimally visualized however   there does appear to be heterogeneous enhancement at this location related to   tonsillitis or phlegmon and developing abscess. Neoplasm cannot be entirely   excluded.   Follow-up could be

## 2018-12-09 VITALS
HEART RATE: 93 BPM | BODY MASS INDEX: 28.17 KG/M2 | OXYGEN SATURATION: 92 % | TEMPERATURE: 97.1 F | DIASTOLIC BLOOD PRESSURE: 92 MMHG | RESPIRATION RATE: 16 BRPM | HEIGHT: 64 IN | SYSTOLIC BLOOD PRESSURE: 144 MMHG | WEIGHT: 165 LBS

## 2018-12-09 PROBLEM — J36 TONSILLAR ABSCESS: Status: ACTIVE | Noted: 2018-12-09

## 2018-12-09 LAB
ALBUMIN SERPL-MCNC: 4.1 G/DL (ref 3.4–5)
ALP BLD-CCNC: 70 U/L (ref 40–129)
ALT SERPL-CCNC: 11 U/L (ref 10–40)
AMYLASE: 49 U/L (ref 25–115)
ANION GAP SERPL CALCULATED.3IONS-SCNC: 12 MMOL/L (ref 3–16)
ANION GAP SERPL CALCULATED.3IONS-SCNC: 14 MMOL/L (ref 3–16)
AST SERPL-CCNC: 13 U/L (ref 15–37)
BASOPHILS ABSOLUTE: 0 K/UL (ref 0–0.2)
BASOPHILS RELATIVE PERCENT: 0.5 %
BILIRUB SERPL-MCNC: 0.3 MG/DL (ref 0–1)
BILIRUBIN DIRECT: <0.2 MG/DL (ref 0–0.3)
BILIRUBIN, INDIRECT: ABNORMAL MG/DL (ref 0–1)
BUN BLDV-MCNC: 7 MG/DL (ref 7–20)
BUN BLDV-MCNC: 7 MG/DL (ref 7–20)
CALCIUM SERPL-MCNC: 8.7 MG/DL (ref 8.3–10.6)
CALCIUM SERPL-MCNC: 8.9 MG/DL (ref 8.3–10.6)
CHLORIDE BLD-SCNC: 101 MMOL/L (ref 99–110)
CHLORIDE BLD-SCNC: 97 MMOL/L (ref 99–110)
CO2: 25 MMOL/L (ref 21–32)
CO2: 27 MMOL/L (ref 21–32)
CREAT SERPL-MCNC: <0.5 MG/DL (ref 0.6–1.1)
CREAT SERPL-MCNC: <0.5 MG/DL (ref 0.6–1.1)
EKG ATRIAL RATE: 93 BPM
EKG DIAGNOSIS: NORMAL
EKG P AXIS: 44 DEGREES
EKG P-R INTERVAL: 132 MS
EKG Q-T INTERVAL: 366 MS
EKG QRS DURATION: 84 MS
EKG QTC CALCULATION (BAZETT): 455 MS
EKG R AXIS: -6 DEGREES
EKG T AXIS: 37 DEGREES
EKG VENTRICULAR RATE: 93 BPM
EOSINOPHILS ABSOLUTE: 0 K/UL (ref 0–0.6)
EOSINOPHILS RELATIVE PERCENT: 0.6 %
GFR AFRICAN AMERICAN: >60
GFR AFRICAN AMERICAN: >60
GFR NON-AFRICAN AMERICAN: >60
GFR NON-AFRICAN AMERICAN: >60
GLUCOSE BLD-MCNC: 102 MG/DL (ref 70–99)
GLUCOSE BLD-MCNC: 110 MG/DL (ref 70–99)
HCT VFR BLD CALC: 41.4 % (ref 36–48)
HCT VFR BLD CALC: 41.4 % (ref 36–48)
HEMOGLOBIN: 14.2 G/DL (ref 12–16)
HEMOGLOBIN: 14.2 G/DL (ref 12–16)
LIPASE: 18 U/L (ref 13–60)
LYMPHOCYTES ABSOLUTE: 1.5 K/UL (ref 1–5.1)
LYMPHOCYTES RELATIVE PERCENT: 19.5 %
MCH RBC QN AUTO: 31.9 PG (ref 26–34)
MCH RBC QN AUTO: 32 PG (ref 26–34)
MCHC RBC AUTO-ENTMCNC: 34.3 G/DL (ref 31–36)
MCHC RBC AUTO-ENTMCNC: 34.4 G/DL (ref 31–36)
MCV RBC AUTO: 93 FL (ref 80–100)
MCV RBC AUTO: 93 FL (ref 80–100)
MONOCYTES ABSOLUTE: 0.7 K/UL (ref 0–1.3)
MONOCYTES RELATIVE PERCENT: 9.1 %
NEUTROPHILS ABSOLUTE: 5.6 K/UL (ref 1.7–7.7)
NEUTROPHILS RELATIVE PERCENT: 70.3 %
PDW BLD-RTO: 12.2 % (ref 12.4–15.4)
PDW BLD-RTO: 12.4 % (ref 12.4–15.4)
PHOSPHORUS: 3.6 MG/DL (ref 2.5–4.9)
PLATELET # BLD: 149 K/UL (ref 135–450)
PLATELET # BLD: 167 K/UL (ref 135–450)
PMV BLD AUTO: 8.7 FL (ref 5–10.5)
PMV BLD AUTO: 9.5 FL (ref 5–10.5)
POTASSIUM REFLEX MAGNESIUM: 4 MMOL/L (ref 3.5–5.1)
POTASSIUM SERPL-SCNC: 3.6 MMOL/L (ref 3.5–5.1)
RBC # BLD: 4.45 M/UL (ref 4–5.2)
RBC # BLD: 4.45 M/UL (ref 4–5.2)
SODIUM BLD-SCNC: 136 MMOL/L (ref 136–145)
SODIUM BLD-SCNC: 140 MMOL/L (ref 136–145)
TOTAL PROTEIN: 6.7 G/DL (ref 6.4–8.2)
TROPONIN: <0.01 NG/ML
TROPONIN: <0.01 NG/ML
TSH REFLEX: 0.89 UIU/ML (ref 0.27–4.2)
WBC # BLD: 6.4 K/UL (ref 4–11)
WBC # BLD: 7.9 K/UL (ref 4–11)

## 2018-12-09 PROCEDURE — 83690 ASSAY OF LIPASE: CPT

## 2018-12-09 PROCEDURE — 6360000002 HC RX W HCPCS: Performed by: INTERNAL MEDICINE

## 2018-12-09 PROCEDURE — 84100 ASSAY OF PHOSPHORUS: CPT

## 2018-12-09 PROCEDURE — 99223 1ST HOSP IP/OBS HIGH 75: CPT | Performed by: OTOLARYNGOLOGY

## 2018-12-09 PROCEDURE — 96361 HYDRATE IV INFUSION ADD-ON: CPT

## 2018-12-09 PROCEDURE — 6370000000 HC RX 637 (ALT 250 FOR IP): Performed by: INTERNAL MEDICINE

## 2018-12-09 PROCEDURE — 2580000003 HC RX 258: Performed by: INTERNAL MEDICINE

## 2018-12-09 PROCEDURE — 2580000003 HC RX 258: Performed by: STUDENT IN AN ORGANIZED HEALTH CARE EDUCATION/TRAINING PROGRAM

## 2018-12-09 PROCEDURE — 96375 TX/PRO/DX INJ NEW DRUG ADDON: CPT

## 2018-12-09 PROCEDURE — 6370000000 HC RX 637 (ALT 250 FOR IP): Performed by: STUDENT IN AN ORGANIZED HEALTH CARE EDUCATION/TRAINING PROGRAM

## 2018-12-09 PROCEDURE — 85027 COMPLETE CBC AUTOMATED: CPT

## 2018-12-09 PROCEDURE — 80076 HEPATIC FUNCTION PANEL: CPT

## 2018-12-09 PROCEDURE — 6370000000 HC RX 637 (ALT 250 FOR IP)

## 2018-12-09 PROCEDURE — 36415 COLL VENOUS BLD VENIPUNCTURE: CPT

## 2018-12-09 PROCEDURE — 6360000002 HC RX W HCPCS: Performed by: OTOLARYNGOLOGY

## 2018-12-09 PROCEDURE — 85025 COMPLETE CBC W/AUTO DIFF WBC: CPT

## 2018-12-09 PROCEDURE — 82150 ASSAY OF AMYLASE: CPT

## 2018-12-09 PROCEDURE — 96365 THER/PROPH/DIAG IV INF INIT: CPT

## 2018-12-09 PROCEDURE — 1200000000 HC SEMI PRIVATE

## 2018-12-09 PROCEDURE — 80048 BASIC METABOLIC PNL TOTAL CA: CPT

## 2018-12-09 PROCEDURE — 2580000003 HC RX 258: Performed by: OTOLARYNGOLOGY

## 2018-12-09 PROCEDURE — 6360000002 HC RX W HCPCS: Performed by: STUDENT IN AN ORGANIZED HEALTH CARE EDUCATION/TRAINING PROGRAM

## 2018-12-09 PROCEDURE — 84484 ASSAY OF TROPONIN QUANT: CPT

## 2018-12-09 PROCEDURE — 84443 ASSAY THYROID STIM HORMONE: CPT

## 2018-12-09 RX ORDER — SODIUM CHLORIDE 0.9 % (FLUSH) 0.9 %
10 SYRINGE (ML) INJECTION PRN
Status: DISCONTINUED | OUTPATIENT
Start: 2018-12-09 | End: 2018-12-09 | Stop reason: HOSPADM

## 2018-12-09 RX ORDER — HYDROCODONE BITARTRATE AND ACETAMINOPHEN 5; 325 MG/1; MG/1
1 TABLET ORAL
Status: COMPLETED | OUTPATIENT
Start: 2018-12-09 | End: 2018-12-09

## 2018-12-09 RX ORDER — SODIUM CHLORIDE 9 MG/ML
INJECTION, SOLUTION INTRAVENOUS CONTINUOUS
Status: ACTIVE | OUTPATIENT
Start: 2018-12-09 | End: 2018-12-09

## 2018-12-09 RX ORDER — MORPHINE SULFATE 2 MG/ML
2 INJECTION, SOLUTION INTRAMUSCULAR; INTRAVENOUS EVERY 4 HOURS PRN
Status: DISCONTINUED | OUTPATIENT
Start: 2018-12-09 | End: 2018-12-09

## 2018-12-09 RX ORDER — KETOROLAC TROMETHAMINE 30 MG/ML
30 INJECTION, SOLUTION INTRAMUSCULAR; INTRAVENOUS ONCE
Status: COMPLETED | OUTPATIENT
Start: 2018-12-09 | End: 2018-12-09

## 2018-12-09 RX ORDER — LEVOTHYROXINE SODIUM 88 UG/1
TABLET ORAL
Status: COMPLETED
Start: 2018-12-09 | End: 2018-12-09

## 2018-12-09 RX ORDER — SODIUM CHLORIDE 0.9 % (FLUSH) 0.9 %
10 SYRINGE (ML) INJECTION EVERY 12 HOURS SCHEDULED
Status: DISCONTINUED | OUTPATIENT
Start: 2018-12-09 | End: 2018-12-09 | Stop reason: HOSPADM

## 2018-12-09 RX ORDER — ACYCLOVIR 400 MG/1
400 TABLET ORAL
Qty: 35 TABLET | Refills: 0 | Status: SHIPPED | OUTPATIENT
Start: 2018-12-09 | End: 2018-12-16

## 2018-12-09 RX ORDER — IBUPROFEN 800 MG/1
800 TABLET ORAL
Qty: 21 TABLET | Refills: 0 | Status: SHIPPED | OUTPATIENT
Start: 2018-12-09 | End: 2018-12-09

## 2018-12-09 RX ORDER — ONDANSETRON 2 MG/ML
4 INJECTION INTRAMUSCULAR; INTRAVENOUS EVERY 6 HOURS PRN
Status: DISCONTINUED | OUTPATIENT
Start: 2018-12-09 | End: 2018-12-09 | Stop reason: HOSPADM

## 2018-12-09 RX ORDER — LEVOTHYROXINE SODIUM 88 UG/1
88 TABLET ORAL DAILY
Status: DISCONTINUED | OUTPATIENT
Start: 2018-12-09 | End: 2018-12-09 | Stop reason: HOSPADM

## 2018-12-09 RX ORDER — HYDROCODONE BITARTRATE AND ACETAMINOPHEN 5; 325 MG/1; MG/1
1-2 TABLET ORAL EVERY 6 HOURS PRN
Qty: 10 TABLET | Refills: 0 | Status: SHIPPED | OUTPATIENT
Start: 2018-12-09 | End: 2018-12-12

## 2018-12-09 RX ORDER — SODIUM CHLORIDE 9 MG/ML
INJECTION, SOLUTION INTRAVENOUS CONTINUOUS
Status: DISCONTINUED | OUTPATIENT
Start: 2018-12-09 | End: 2018-12-09

## 2018-12-09 RX ORDER — IBUPROFEN 800 MG/1
800 TABLET ORAL EVERY 8 HOURS PRN
Qty: 21 TABLET | Refills: 0 | Status: SHIPPED | OUTPATIENT
Start: 2018-12-09 | End: 2018-12-19

## 2018-12-09 RX ADMIN — ACYCLOVIR SODIUM 940 MG: 50 INJECTION, SOLUTION INTRAVENOUS at 10:42

## 2018-12-09 RX ADMIN — SODIUM CHLORIDE: 9 INJECTION, SOLUTION INTRAVENOUS at 12:15

## 2018-12-09 RX ADMIN — AMPICILLIN SODIUM AND SULBACTAM SODIUM 1.5 G: 1; .5 INJECTION, POWDER, FOR SOLUTION INTRAMUSCULAR; INTRAVENOUS at 08:42

## 2018-12-09 RX ADMIN — Medication 10 ML: at 08:47

## 2018-12-09 RX ADMIN — MORPHINE SULFATE 2 MG: 2 INJECTION, SOLUTION INTRAMUSCULAR; INTRAVENOUS at 08:43

## 2018-12-09 RX ADMIN — KETOROLAC TROMETHAMINE 30 MG: 30 INJECTION, SOLUTION INTRAMUSCULAR at 12:23

## 2018-12-09 RX ADMIN — MORPHINE SULFATE 2 MG: 2 INJECTION, SOLUTION INTRAMUSCULAR; INTRAVENOUS at 04:36

## 2018-12-09 RX ADMIN — HYDROCODONE BITARTRATE AND ACETAMINOPHEN 1 TABLET: 5; 325 TABLET ORAL at 20:01

## 2018-12-09 RX ADMIN — BENZOCAINE: 200 SPRAY DENTAL; ORAL; PERIODONTAL at 04:36

## 2018-12-09 RX ADMIN — DEXAMETHASONE SODIUM PHOSPHATE 10 MG: 4 INJECTION, SOLUTION INTRAMUSCULAR; INTRAVENOUS at 00:18

## 2018-12-09 RX ADMIN — SODIUM CHLORIDE: 9 INJECTION, SOLUTION INTRAVENOUS at 14:11

## 2018-12-09 RX ADMIN — SODIUM CHLORIDE: 900 INJECTION, SOLUTION INTRAVENOUS at 02:30

## 2018-12-09 RX ADMIN — IBUPROFEN 600 MG: 200 TABLET, FILM COATED ORAL at 17:03

## 2018-12-09 RX ADMIN — LEVOTHYROXINE SODIUM 88 MCG: 88 TABLET ORAL at 06:23

## 2018-12-09 RX ADMIN — ONDANSETRON 4 MG: 2 INJECTION INTRAMUSCULAR; INTRAVENOUS at 00:14

## 2018-12-09 RX ADMIN — ACYCLOVIR SODIUM 940 MG: 50 INJECTION, SOLUTION INTRAVENOUS at 18:41

## 2018-12-09 RX ADMIN — BENZOCAINE: 200 SPRAY DENTAL; ORAL; PERIODONTAL at 12:15

## 2018-12-09 RX ADMIN — KETOROLAC TROMETHAMINE 30 MG: 30 INJECTION, SOLUTION INTRAMUSCULAR at 00:22

## 2018-12-09 RX ADMIN — AMPICILLIN SODIUM AND SULBACTAM SODIUM 1.5 G: 1; .5 INJECTION, POWDER, FOR SOLUTION INTRAMUSCULAR; INTRAVENOUS at 02:45

## 2018-12-09 RX ADMIN — SODIUM CHLORIDE: 9 INJECTION, SOLUTION INTRAVENOUS at 00:12

## 2018-12-09 ASSESSMENT — PAIN DESCRIPTION - DESCRIPTORS
DESCRIPTORS: SHARP

## 2018-12-09 ASSESSMENT — PAIN DESCRIPTION - ORIENTATION
ORIENTATION: RIGHT;LEFT
ORIENTATION: LEFT;RIGHT
ORIENTATION: MID
ORIENTATION: LEFT
ORIENTATION: LEFT
ORIENTATION: LEFT;INNER

## 2018-12-09 ASSESSMENT — PAIN DESCRIPTION - LOCATION
LOCATION: HEAD;THROAT
LOCATION: THROAT
LOCATION: THROAT;HEAD
LOCATION: THROAT

## 2018-12-09 ASSESSMENT — PAIN SCALES - GENERAL
PAINLEVEL_OUTOF10: 9
PAINLEVEL_OUTOF10: 6
PAINLEVEL_OUTOF10: 8
PAINLEVEL_OUTOF10: 8
PAINLEVEL_OUTOF10: 6
PAINLEVEL_OUTOF10: 4
PAINLEVEL_OUTOF10: 10
PAINLEVEL_OUTOF10: 9

## 2018-12-09 ASSESSMENT — PAIN DESCRIPTION - ONSET
ONSET: ON-GOING

## 2018-12-09 ASSESSMENT — ENCOUNTER SYMPTOMS
CHOKING: 0
COUGH: 0
VOMITING: 0
ABDOMINAL PAIN: 0
SHORTNESS OF BREATH: 0
DIARRHEA: 0
TROUBLE SWALLOWING: 1
SORE THROAT: 1
NAUSEA: 0
STRIDOR: 0

## 2018-12-09 ASSESSMENT — ACTIVITIES OF DAILY LIVING (ADL)
EFFECT OF PAIN ON DAILY ACTIVITIES: DISCOMFORT

## 2018-12-09 ASSESSMENT — PAIN DESCRIPTION - FREQUENCY
FREQUENCY: CONTINUOUS

## 2018-12-09 ASSESSMENT — PAIN DESCRIPTION - DIRECTION
RADIATING_TOWARDS: DOWN THROAT

## 2018-12-09 ASSESSMENT — PAIN DESCRIPTION - PROGRESSION
CLINICAL_PROGRESSION: GRADUALLY WORSENING
CLINICAL_PROGRESSION: NOT CHANGED
CLINICAL_PROGRESSION: GRADUALLY IMPROVING
CLINICAL_PROGRESSION: GRADUALLY IMPROVING
CLINICAL_PROGRESSION: NOT CHANGED
CLINICAL_PROGRESSION: GRADUALLY WORSENING

## 2018-12-09 ASSESSMENT — PAIN DESCRIPTION - PAIN TYPE
TYPE: ACUTE PAIN

## 2018-12-09 NOTE — PROGRESS NOTES
4 Eyes Admission Assessment     I agree as the admission nurse that 2 RN's have performed a thorough Head to Toe Skin Assessment on the patient. ALL assessment sites listed below have been assessed on admission. Areas assessed by both nurses:   [x]   Head, Face, and Ears   [x]   Shoulders, Back, and Chest  [x]   Arms, Elbows, and Hands   [x]   Coccyx, Sacrum, and Ischum  [x]   Legs, Feet, and Heels        Does the Patient have Skin Breakdown?   No         Christoph Prevention initiated:  No   Wound Care Orders initiated:  No      [facility] nurse consulted for Pressure Injury (Stage 3,4, Unstageable, DTI, NWPT, and Complex wounds):  No      Nurse 1 eSignature: Electronically signed by Meeta Mackenzie RN on 12/9/18 at 4:55 AM    **SHARE this note so that the co-signing nurse is able to place an eSignature**    Nurse 2 eSignature: Electronically signed by Marcia Delgado RN on 12/9/18 at 5:43 AM

## 2018-12-09 NOTE — ED PROVIDER NOTES
36.0 - 48.0 %    MCV 93.0 80.0 - 100.0 fL    MCH 32.0 26.0 - 34.0 pg    MCHC 34.4 31.0 - 36.0 g/dL    RDW 12.4 12.4 - 15.4 %    Platelets 185 229 - 648 K/uL    MPV 8.7 5.0 - 10.5 fL    Neutrophils % 70.3 %    Lymphocytes % 19.5 %    Monocytes % 9.1 %    Eosinophils % 0.6 %    Basophils % 0.5 %    Neutrophils # 5.6 1.7 - 7.7 K/uL    Lymphocytes # 1.5 1.0 - 5.1 K/uL    Monocytes # 0.7 0.0 - 1.3 K/uL    Eosinophils # 0.0 0.0 - 0.6 K/uL    Basophils # 0.0 0.0 - 0.2 K/uL   Renal function panel   Result Value Ref Range    Sodium 136 136 - 145 mmol/L    Potassium 3.6 3.5 - 5.1 mmol/L    Chloride 97 (L) 99 - 110 mmol/L    CO2 27 21 - 32 mmol/L    Anion Gap 12 3 - 16    Glucose 102 (H) 70 - 99 mg/dL    BUN 7 7 - 20 mg/dL    CREATININE <0.5 (L) 0.6 - 1.1 mg/dL    GFR Non-African American >60 >60    GFR African American >60 >60    Calcium 8.9 8.3 - 10.6 mg/dL    Phosphorus 3.6 2.5 - 4.9 mg/dL    Alb 4.1 3.4 - 5.0 g/dL   Lipase   Result Value Ref Range    Lipase 18.0 13.0 - 60.0 U/L   Amylase   Result Value Ref Range    Amylase 49 25 - 115 U/L   Hepatic function panel (LFTs)   Result Value Ref Range    Total Protein 6.7 6.4 - 8.2 g/dL    Alkaline Phosphatase 70 40 - 129 U/L    ALT 11 10 - 40 U/L    AST 13 (L) 15 - 37 U/L    Total Bilirubin 0.3 0.0 - 1.0 mg/dL    Bilirubin, Direct <0.2 0.0 - 0.3 mg/dL    Bilirubin, Indirect see below 0.0 - 1.0 mg/dL   Troponin   Result Value Ref Range    Troponin <0.01 <0.01 ng/mL       ED BEDSIDE ULTRASOUND:  none    RECENT VITALS:  BP: (!) 145/95, Temp: 97.3 °F (36.3 °C), Pulse: 78,Resp: 18, SpO2: 96 %     Procedures     none    ED Course     Nursing Notes, Past Medical Hx, Past Surgical Hx, Social Hx, Allergies, and Family Hx were reviewed.     The patient was given the followingmedications:  Orders Placed This Encounter   Medications    ketorolac (TORADOL) injection 30 mg    dexamethasone (DECADRON) injection 10 mg    ondansetron (ZOFRAN) injection 4 mg    0.9 % sodium chloride infusion

## 2018-12-09 NOTE — CONSULTS
VERONIQUE ARRIAGA M.D. Consult Note      CHIEF COMPLAINT:  Left-sided throat pain. HISTORY OF PRESENT ILLNESS:      The patient is a 52 y.o. female who presents with left-sided throat pain of 5 days' duration. Pain is very severe, radiates to the left ear, and increases with swallowing. The patient was seen by her primary care physician early in the course and given corticosteroids but the pain became worse. She presented to the emergency department 2 days ago and was put on antibiotics. Her pain continued to increase in severity to presented to the emergency department at [facility] last evening where she was admitted. She has been on intravenous corticosteroids, antibiotics, and analgesics. She has little improvement. Current Medications:   Current Facility-Administered Medications: benzocaine-menthol (CEPACOL SORE THROAT) lozenge 1 lozenge, 1 lozenge, Oral, Q2H PRN  levothyroxine (SYNTHROID) tablet 88 mcg, 88 mcg, Oral, Daily  sodium chloride flush 0.9 % injection 10 mL, 10 mL, Intravenous, 2 times per day  sodium chloride flush 0.9 % injection 10 mL, 10 mL, Intravenous, PRN  magnesium hydroxide (MILK OF MAGNESIA) 400 MG/5ML suspension 30 mL, 30 mL, Oral, Daily PRN  ondansetron (ZOFRAN) injection 4 mg, 4 mg, Intravenous, Q6H PRN  enoxaparin (LOVENOX) injection 40 mg, 40 mg, Subcutaneous, Daily  ampicillin-sulbactam (UNASYN) 1.5 g IVPB minibag, 1.5 g, Intravenous, Q6H  benzocaine (HURRICAINE) 20 % oral spray, , Mouth/Throat, 4x Daily  morphine injection 2 mg, 2 mg, Intravenous, Q4H PRN  0.9 % sodium chloride infusion, , Intravenous, Continuous  acyclovir (ZOVIRAX) 1,120 mg in dextrose 5 % 250 mL IVPB, 15 mg/kg, Intravenous, Q8H  Allergies:  Patient has no known allergies.     History   Smoking Status    Former Smoker    Packs/day: 1.00    Years: 15.00    Quit date: 1/1/1991   Smokeless Tobacco    Never Used     History   Alcohol Use No       Current Facility-Administered Medications:    BMI 28.32 kg/m²   WELL DEVELOPED WELL NOURISHED. NO PERIPHERAL EDEMA. NO ACUTE RESPIRATORY DISTRESS. ORIENTED X 3.  VOICE: Normal  HEARING BY CONFRONTATION IS NORMAL. HEAD AND FACE: Normal  EXTERNAL EARS AND NOSE : Normal  EXTRAOCULAR MUSCLES:  Intact  FACIAL MUSCLES: Normal strength  FACE PALPATION: Normal  OTOSCOPY: Normal tympanic membranes and middle ears  INTRANASAL: Septum midline. meati clear. Turbinates normal.  LIPS, TEETH, GINGIVA: Normal  BUCCAL MUCOSA: Normal  PHARYNX: Tonsils are normal.  There is erythema of the posterior tonsillar pillar on the left side. Intraoral palpation suggests no induration of the tonsil or tongue base. NECK: No masses or adenopathy  SALIVARY GLANDS: Normal  THYROID: Normal  NASOPHARYNX, HYPOPHARYNX, LARYNX: No indication for examination based on symptoms. CT REVIEWED:  No suppuration within the peritonsillar parapharyngeal areas  IMPRESSION: Suspect that patient has a viral lesions of the left posterior pharyngeal wall. RECOMMENDATIONS: Unfortunately she must attend her father's  tomorrow morning out of town. 2 to current medication of IV antibiotics and steroids, I have had acyclovir to be given IV twice over the next 8 hours. House staff will evaluate the patient late this evening and if things are ameliorated that they will discharge her so that she may attend a .  He will follow with me in the outpatient setting next week.

## 2018-12-09 NOTE — PROGRESS NOTES
Resident Progress Note    Admit Date: 2018    PCP: Minoo Dodson MD                  : 1965  MRN: [id number]    Subjective: Interval History: Pt continues to have pain. States benzocaine helpful but burns initially. Seen by ENT. States morphine helpful but relief does not last. Lozenges give sensation of choking. Denies chest pain, shortness of breath. Diet: DIET FULL LIQUID;  Pain is: Moderate  Nausea:None      Data:   Scheduled Meds:   levothyroxine  88 mcg Oral Daily    sodium chloride flush  10 mL Intravenous 2 times per day    enoxaparin  40 mg Subcutaneous Daily    ampicillin-sulbactam  1.5 g Intravenous Q6H    benzocaine   Mouth/Throat 4x Daily    acyclovir  15 mg/kg (Adjusted) Intravenous Q8H    ketorolac  30 mg Intravenous Once     Continuous Infusions:   sodium chloride 200 mL/hr at 18 1215     PRN Meds:benzocaine-menthol, sodium chloride flush, magnesium hydroxide, ondansetron  No intake/output data recorded. I/O this shift:  In: 120 [P.O.:120]  Out: -     Intake/Output Summary (Last 24 hours) at 18 1222  Last data filed at 18 1054   Gross per 24 hour   Intake              120 ml   Output                0 ml   Net              120 ml           Objective:     Vitals: BP (!) 145/97   Pulse 90   Temp 97.9 °F (36.6 °C) (Oral)   Resp 18   Ht 5' 4\" (1.626 m)   Wt 165 lb (74.8 kg)   SpO2 93%   BMI 28.32 kg/m²     Physical Exam   Constitutional: She is oriented to person, place, and time. She appears well-developed and well-nourished. No distress. HENT:   Head: Normocephalic and atraumatic. Mouth/Throat: Oropharynx is clear and moist.   Eyes: Conjunctivae and EOM are normal. Right eye exhibits no discharge. Left eye exhibits no discharge. No scleral icterus. Neck: Normal range of motion. Neck supple. Cardiovascular: Normal rate, regular rhythm, normal heart sounds and intact distal pulses. Exam reveals no gallop and no friction rub.     No murmur heard.  Pulmonary/Chest: Effort normal and breath sounds normal. No respiratory distress. She has no wheezes. She has no rales. She exhibits no tenderness. Abdominal: Soft. She exhibits no distension and no mass. There is no tenderness. There is no rebound and no guarding. Musculoskeletal: Normal range of motion. She exhibits no edema, tenderness or deformity. Neurological: She is alert and oriented to person, place, and time. No cranial nerve deficit. Coordination normal.   Skin: Skin is warm and dry. No rash noted. She is not diaphoretic. No erythema. No pallor. Psychiatric: She has a normal mood and affect. Her behavior is normal. Judgment and thought content normal.   Nursing note and vitals reviewed. LABS:    CBC:   Recent Labs      12/08/18   0855 12/09/18   0005  12/09/18   0709   WBC  5.9  7.9  6.4   HGB  13.8  14.2  14.2   HCT  40.2  41.4  41.4   MCV  93.4  93.0  93.0   PLT  161  149  167                                                                BMP:  Recent Labs      12/08/18   0855  12/09/18   0005  12/09/18   0709   NA  141  136  140   K  3.3*  3.6  4.0   CL  101  97*  101   CO2  26  27  25   BUN  8  7  7   CREATININE  <0.5*  <0.5*  <0.5*   GLUCOSE  86  102*  110*       LFT's: Recent Labs      12/08/18   0855  12/09/18   0005   AST  10*  13*   ALT  11  11   BILITOT  <0.2  0.3   ALKPHOS  61  70       Troponin:   Recent Labs      12/09/18   0005  12/09/18   0709   TROPONINI  <0.01  <0.01       BNP: No results for input(s): BNP in the last 72 hours. Lipids: No results for input(s): CHOL, HDL in the last 72 hours. Invalid input(s): LDLCALCU    ABGs: No results found for: PHART, LNK1IWO, PO2ART    INR: No results for input(s): INR in the last 72 hours.     U/A:No results for input(s): NITRITE, COLORU, PHUR, LABCAST, WBCUA, RBCUA, MUCUS, TRICHOMONAS, YEAST, BACTERIA, CLARITYU, SPECGRAV, LEUKOCYTESUR, UROBILINOGEN, BILIRUBINUR, BLOODU, GLUCOSEU, AMORPHOUS in the last 72

## 2018-12-10 LAB — S PYO THROAT QL CULT: NORMAL

## 2018-12-10 NOTE — DISCHARGE SUMMARY
bilaterally       Labs: For convenience and continuity at follow-up the following most recent labs are provided:      CBC:    Lab Results   Component Value Date    WBC 6.4 12/09/2018    HGB 14.2 12/09/2018    HCT 41.4 12/09/2018     12/09/2018       Renal:    Lab Results   Component Value Date     12/09/2018    K 4.0 12/09/2018     12/09/2018    CO2 25 12/09/2018    BUN 7 12/09/2018    CREATININE <0.5 12/09/2018    CALCIUM 8.7 12/09/2018    PHOS 3.6 12/09/2018         Significant Diagnostic Studies    Radiology:   No orders to display          Consults:     IP CONSULT TO OTOLARYNGOLOGY    Disposition:  Home     Condition at Discharge: Stable    Discharge Instructions/Follow-up:    - Follow up with ENT  - Follow up with PCP    Code Status:  Full Code    Activity: activity as tolerated    Diet: regular diet      Discharge Medications:     Discharge Medication List as of 12/9/2018  7:48 PM           Details   benzocaine (HURRICAINE) 20 % AERO oral spray Take 1 each by mouth 4 times daily as needed for Pain, Disp-1 Can, R-0Print      acyclovir (ZOVIRAX) 400 MG tablet Take 1 tablet by mouth 5 times daily for 7 days, Disp-35 tablet, R-0Print              Details   HYDROcodone-acetaminophen (NORCO) 5-325 MG per tablet Take 1-2 tablets by mouth every 6 hours as needed for Pain for up to 3 days.  Sedation precautions please., Disp-10 tablet, R-0Print      ibuprofen (ADVIL;MOTRIN) 800 MG tablet Take 1 tablet by mouth every 8 hours as needed for Pain, Disp-21 tablet, R-0Print              Details   benzocaine-menthol (CEPACOL SORE THROAT MAX NUMB) 15-4 MG LOZG lozenge Take 1 lozenge by mouth every 2 hours as needed for Sore Throat, Disp-168 lozenge, R-0Print      cetirizine (ZYRTEC) 10 MG tablet Take 1 tablet by mouth daily, Disp-90 tablet, R-1Normal      estradiol (ESTRACE) 1 MG tablet Take 1 mg by mouth dailyHistorical Med      levothyroxine (SYNTHROID) 88 MCG tablet TAKE 1 TABLET DAILY (WILL NEED TO MAKE

## 2018-12-11 ENCOUNTER — OFFICE VISIT (OUTPATIENT)
Dept: ENT CLINIC | Age: 53
End: 2018-12-11
Payer: COMMERCIAL

## 2018-12-11 VITALS
HEART RATE: 76 BPM | WEIGHT: 168.2 LBS | HEIGHT: 64 IN | TEMPERATURE: 96.6 F | SYSTOLIC BLOOD PRESSURE: 131 MMHG | BODY MASS INDEX: 28.71 KG/M2 | DIASTOLIC BLOOD PRESSURE: 89 MMHG

## 2018-12-11 DIAGNOSIS — K12.1 STOMATITIS, VIRAL: Primary | ICD-10-CM

## 2018-12-11 DIAGNOSIS — B97.89 STOMATITIS, VIRAL: Primary | ICD-10-CM

## 2018-12-11 PROCEDURE — 42808 EXCISE PHARYNX LESION: CPT | Performed by: OTOLARYNGOLOGY

## 2018-12-11 RX ORDER — ONDANSETRON 4 MG/1
4 TABLET, ORALLY DISINTEGRATING ORAL EVERY 4 HOURS PRN
Qty: 20 TABLET | Refills: 1 | Status: SHIPPED | OUTPATIENT
Start: 2018-12-11 | End: 2019-01-09 | Stop reason: SDUPTHER

## 2018-12-11 NOTE — PROGRESS NOTES
FOLLOW UP VISIT:      INTERIM HISTORY: Patient seen by me at [facility] 3 days ago. She had severe pain on the left side of her throat with odynophagia. He been treated with antibiotics and corticosteroids and about resolved. I thought the etiology might be viral stomatitis and put her on high doses of acyclovir. Her throat pain is abated but still present but she has nausea, headache, and malaise. PAST MEDICAL HISTORY:   History   Smoking Status    Former Smoker    Packs/day: 1.00    Years: 15.00    Quit date: 1/1/1991   Smokeless Tobacco    Never Used                                                    History   Alcohol Use No                                                    Current Outpatient Prescriptions:     HYDROcodone-acetaminophen (NORCO) 5-325 MG per tablet, Take 1-2 tablets by mouth every 6 hours as needed for Pain for up to 3 days. Sedation precautions please., Disp: 10 tablet, Rfl: 0    benzocaine (HURRICAINE) 20 % AERO oral spray, Take 1 each by mouth 4 times daily as needed for Pain, Disp: 1 Can, Rfl: 0    acyclovir (ZOVIRAX) 400 MG tablet, Take 1 tablet by mouth 5 times daily for 7 days, Disp: 35 tablet, Rfl: 0    ibuprofen (ADVIL;MOTRIN) 800 MG tablet, Take 1 tablet by mouth every 8 hours as needed for Pain, Disp: 21 tablet, Rfl: 0    benzocaine-menthol (CEPACOL SORE THROAT MAX NUMB) 15-4 MG LOZG lozenge, Take 1 lozenge by mouth every 2 hours as needed for Sore Throat, Disp: 168 lozenge, Rfl: 0    cetirizine (ZYRTEC) 10 MG tablet, Take 1 tablet by mouth daily, Disp: 90 tablet, Rfl: 1    estradiol (ESTRACE) 1 MG tablet, Take 1 mg by mouth daily, Disp: , Rfl:     levothyroxine (SYNTHROID) 88 MCG tablet, TAKE 1 TABLET DAILY (WILL NEED TO MAKE APPOINTMENT BEFORE ANY FUTURE REFILLS), Disp: 90 tablet, Rfl: 3    Cholecalciferol (VITAMIN D3 PO), Take by mouth, Disp: , Rfl:     Ascorbic Acid (VITAMIN C) 500 MG tablet, Take 500 mg by mouth daily. , Disp: , Rfl:

## 2018-12-12 ENCOUNTER — TELEPHONE (OUTPATIENT)
Dept: FAMILY MEDICINE CLINIC | Age: 53
End: 2018-12-12

## 2018-12-13 ENCOUNTER — NURSE ONLY (OUTPATIENT)
Dept: FAMILY MEDICINE CLINIC | Age: 53
End: 2018-12-13
Payer: COMMERCIAL

## 2018-12-13 DIAGNOSIS — J36 TONSIL, ABSCESS: ICD-10-CM

## 2018-12-13 LAB
ALBUMIN SERPL-MCNC: 4.5 G/DL (ref 3.4–5)
ANION GAP SERPL CALCULATED.3IONS-SCNC: 12 MMOL/L (ref 3–16)
BUN BLDV-MCNC: 6 MG/DL (ref 7–20)
CALCIUM SERPL-MCNC: 9.4 MG/DL (ref 8.3–10.6)
CHLORIDE BLD-SCNC: 99 MMOL/L (ref 99–110)
CO2: 27 MMOL/L (ref 21–32)
CREAT SERPL-MCNC: 0.6 MG/DL (ref 0.6–1.1)
GFR AFRICAN AMERICAN: >60
GFR NON-AFRICAN AMERICAN: >60
GLUCOSE BLD-MCNC: 101 MG/DL (ref 70–99)
PHOSPHORUS: 3.4 MG/DL (ref 2.5–4.9)
POTASSIUM SERPL-SCNC: 4 MMOL/L (ref 3.5–5.1)
SODIUM BLD-SCNC: 138 MMOL/L (ref 136–145)

## 2018-12-13 PROCEDURE — 36415 COLL VENOUS BLD VENIPUNCTURE: CPT | Performed by: FAMILY MEDICINE

## 2018-12-14 ENCOUNTER — TELEPHONE (OUTPATIENT)
Dept: ENT CLINIC | Age: 53
End: 2018-12-14

## 2018-12-19 ENCOUNTER — OFFICE VISIT (OUTPATIENT)
Dept: ENT CLINIC | Age: 53
End: 2018-12-19
Payer: COMMERCIAL

## 2018-12-19 VITALS
SYSTOLIC BLOOD PRESSURE: 112 MMHG | DIASTOLIC BLOOD PRESSURE: 72 MMHG | BODY MASS INDEX: 28.48 KG/M2 | WEIGHT: 166.8 LBS | HEART RATE: 88 BPM | TEMPERATURE: 97 F | HEIGHT: 64 IN

## 2018-12-19 DIAGNOSIS — R07.0 THROAT PAIN: ICD-10-CM

## 2018-12-19 DIAGNOSIS — K21.9 LARYNGOPHARYNGEAL REFLUX (LPR): ICD-10-CM

## 2018-12-19 PROCEDURE — 31575 DIAGNOSTIC LARYNGOSCOPY: CPT | Performed by: OTOLARYNGOLOGY

## 2018-12-19 RX ORDER — OMEPRAZOLE 40 MG/1
40 CAPSULE, DELAYED RELEASE ORAL DAILY
Qty: 30 CAPSULE | Refills: 3 | Status: SHIPPED | OUTPATIENT
Start: 2018-12-19 | End: 2019-03-27

## 2018-12-21 RX ORDER — ACYCLOVIR 400 MG/1
400 TABLET ORAL
Qty: 50 TABLET | Refills: 1 | Status: SHIPPED | OUTPATIENT
Start: 2018-12-21 | End: 2018-12-31

## 2019-01-09 ENCOUNTER — TELEPHONE (OUTPATIENT)
Dept: FAMILY MEDICINE CLINIC | Age: 54
End: 2019-01-09

## 2019-01-09 DIAGNOSIS — J01.40 ACUTE NON-RECURRENT PANSINUSITIS: ICD-10-CM

## 2019-01-09 DIAGNOSIS — J02.9 SORE THROAT: ICD-10-CM

## 2019-01-09 RX ORDER — ONDANSETRON 4 MG/1
4 TABLET, ORALLY DISINTEGRATING ORAL EVERY 4 HOURS PRN
Qty: 20 TABLET | Refills: 0 | Status: SHIPPED | OUTPATIENT
Start: 2019-01-09 | End: 2019-03-27

## 2019-01-09 RX ORDER — ATOVAQUONE AND PROGUANIL HYDROCHLORIDE 250; 100 MG/1; MG/1
TABLET, FILM COATED ORAL
Qty: 30 TABLET | Refills: 0 | Status: SHIPPED | OUTPATIENT
Start: 2019-01-09 | End: 2019-03-27

## 2019-01-09 RX ORDER — DOXYCYCLINE HYCLATE 100 MG
100 TABLET ORAL 2 TIMES DAILY
Qty: 20 TABLET | Refills: 0 | Status: SHIPPED | OUTPATIENT
Start: 2019-01-09 | End: 2019-01-19

## 2019-01-24 ENCOUNTER — NURSE ONLY (OUTPATIENT)
Dept: FAMILY MEDICINE CLINIC | Age: 54
End: 2019-01-24
Payer: COMMERCIAL

## 2019-01-24 DIAGNOSIS — Z23 NEED FOR TDAP VACCINATION: Primary | ICD-10-CM

## 2019-01-24 PROCEDURE — 90471 IMMUNIZATION ADMIN: CPT | Performed by: FAMILY MEDICINE

## 2019-01-24 PROCEDURE — 90715 TDAP VACCINE 7 YRS/> IM: CPT | Performed by: FAMILY MEDICINE

## 2019-03-11 ENCOUNTER — HOSPITAL ENCOUNTER (OUTPATIENT)
Dept: WOMENS IMAGING | Age: 54
Discharge: HOME | End: 2019-03-11
Payer: COMMERCIAL

## 2019-03-11 DIAGNOSIS — R92.8 ABNORMAL SCREENING MAMMOGRAM: Primary | ICD-10-CM

## 2019-03-11 DIAGNOSIS — Z12.31 ENCOUNTER FOR SCREENING MAMMOGRAM FOR BREAST CANCER: ICD-10-CM

## 2019-03-11 PROCEDURE — 77067 SCR MAMMO BI INCL CAD: CPT

## 2019-03-15 ENCOUNTER — HOSPITAL ENCOUNTER (OUTPATIENT)
Dept: MAMMOGRAPHY | Age: 54
Discharge: HOME | End: 2019-03-15
Payer: COMMERCIAL

## 2019-03-15 ENCOUNTER — HOSPITAL ENCOUNTER (OUTPATIENT)
Dept: ULTRASOUND IMAGING | Age: 54
Discharge: HOME | End: 2019-03-15
Payer: COMMERCIAL

## 2019-03-15 DIAGNOSIS — R92.8 ABNORMAL SCREENING MAMMOGRAM: ICD-10-CM

## 2019-03-15 PROCEDURE — 76642 ULTRASOUND BREAST LIMITED: CPT

## 2019-03-15 PROCEDURE — G0279 TOMOSYNTHESIS, MAMMO: HCPCS

## 2019-03-26 ENCOUNTER — NURSE ONLY (OUTPATIENT)
Dept: FAMILY MEDICINE CLINIC | Age: 54
End: 2019-03-26
Payer: COMMERCIAL

## 2019-03-26 DIAGNOSIS — E03.9 ACQUIRED HYPOTHYROIDISM: Primary | ICD-10-CM

## 2019-03-26 DIAGNOSIS — E78.5 HYPERLIPIDEMIA, UNSPECIFIED HYPERLIPIDEMIA TYPE: ICD-10-CM

## 2019-03-26 DIAGNOSIS — Z79.899 CURRENT USE OF ESTROGEN THERAPY: ICD-10-CM

## 2019-03-26 LAB
A/G RATIO: 1.4 (ref 1.1–2.2)
ALBUMIN SERPL-MCNC: 3.9 G/DL (ref 3.4–5)
ALP BLD-CCNC: 61 U/L (ref 40–129)
ALT SERPL-CCNC: 6 U/L (ref 10–40)
ANION GAP SERPL CALCULATED.3IONS-SCNC: 12 MMOL/L (ref 3–16)
AST SERPL-CCNC: 10 U/L (ref 15–37)
BILIRUB SERPL-MCNC: 0.3 MG/DL (ref 0–1)
BUN BLDV-MCNC: 11 MG/DL (ref 7–20)
CALCIUM SERPL-MCNC: 9.1 MG/DL (ref 8.3–10.6)
CHLORIDE BLD-SCNC: 104 MMOL/L (ref 99–110)
CHOLESTEROL, TOTAL: 230 MG/DL (ref 0–199)
CO2: 25 MMOL/L (ref 21–32)
CREAT SERPL-MCNC: 0.5 MG/DL (ref 0.6–1.1)
GFR AFRICAN AMERICAN: >60
GFR NON-AFRICAN AMERICAN: >60
GLOBULIN: 2.7 G/DL
GLUCOSE BLD-MCNC: 88 MG/DL (ref 70–99)
HDLC SERPL-MCNC: 40 MG/DL (ref 40–60)
LDL CHOLESTEROL CALCULATED: 153 MG/DL
POTASSIUM SERPL-SCNC: 4.1 MMOL/L (ref 3.5–5.1)
SODIUM BLD-SCNC: 141 MMOL/L (ref 136–145)
T4 FREE: 1.4 NG/DL (ref 0.9–1.8)
TOTAL PROTEIN: 6.6 G/DL (ref 6.4–8.2)
TRIGL SERPL-MCNC: 185 MG/DL (ref 0–150)
TSH SERPL DL<=0.05 MIU/L-ACNC: 1.6 UIU/ML (ref 0.27–4.2)
VITAMIN D 25-HYDROXY: 58.3 NG/ML
VLDLC SERPL CALC-MCNC: 37 MG/DL

## 2019-03-26 PROCEDURE — 36415 COLL VENOUS BLD VENIPUNCTURE: CPT | Performed by: FAMILY MEDICINE

## 2019-03-26 RX ORDER — LEVOTHYROXINE SODIUM 88 UG/1
TABLET ORAL
Qty: 30 TABLET | Refills: 11 | Status: SHIPPED | OUTPATIENT
Start: 2019-03-26 | End: 2019-06-25 | Stop reason: SDUPTHER

## 2019-03-27 ENCOUNTER — OFFICE VISIT (OUTPATIENT)
Dept: FAMILY MEDICINE CLINIC | Age: 54
End: 2019-03-27
Payer: COMMERCIAL

## 2019-03-27 VITALS
HEART RATE: 64 BPM | OXYGEN SATURATION: 98 % | DIASTOLIC BLOOD PRESSURE: 82 MMHG | HEIGHT: 64 IN | BODY MASS INDEX: 26.46 KG/M2 | WEIGHT: 155 LBS | SYSTOLIC BLOOD PRESSURE: 128 MMHG

## 2019-03-27 DIAGNOSIS — E03.9 HYPOTHYROIDISM, UNSPECIFIED TYPE: ICD-10-CM

## 2019-03-27 DIAGNOSIS — Z00.00 WELL ADULT EXAM: Primary | ICD-10-CM

## 2019-03-27 DIAGNOSIS — E78.5 HYPERLIPIDEMIA, UNSPECIFIED HYPERLIPIDEMIA TYPE: ICD-10-CM

## 2019-03-27 PROCEDURE — 99396 PREV VISIT EST AGE 40-64: CPT | Performed by: FAMILY MEDICINE

## 2019-03-27 ASSESSMENT — PATIENT HEALTH QUESTIONNAIRE - PHQ9
2. FEELING DOWN, DEPRESSED OR HOPELESS: 0
SUM OF ALL RESPONSES TO PHQ QUESTIONS 1-9: 0
SUM OF ALL RESPONSES TO PHQ9 QUESTIONS 1 & 2: 0
SUM OF ALL RESPONSES TO PHQ QUESTIONS 1-9: 0
1. LITTLE INTEREST OR PLEASURE IN DOING THINGS: 0

## 2019-03-27 ASSESSMENT — ENCOUNTER SYMPTOMS
DIARRHEA: 0
COLOR CHANGE: 0
CHEST TIGHTNESS: 0
CONSTIPATION: 0
BLOOD IN STOOL: 0
ABDOMINAL PAIN: 0
COUGH: 0
SHORTNESS OF BREATH: 0

## 2019-06-25 RX ORDER — LEVOTHYROXINE SODIUM 88 UG/1
TABLET ORAL
Qty: 30 TABLET | Refills: 11 | Status: SHIPPED | OUTPATIENT
Start: 2019-06-25 | End: 2020-06-15

## 2019-07-16 LAB
CANDIDA SPECIES, DNA PROBE: NORMAL
GARDNERELLA VAGINALIS, DNA PROBE: NORMAL
TRICHOMONAS VAGINALIS DNA: NORMAL

## 2019-07-17 LAB
HPV COMMENT: NORMAL
HPV TYPE 16: NOT DETECTED
HPV TYPE 18: NOT DETECTED
HPVOH (OTHER TYPES): NOT DETECTED

## 2019-08-26 ENCOUNTER — OFFICE VISIT (OUTPATIENT)
Dept: FAMILY MEDICINE CLINIC | Age: 54
End: 2019-08-26
Payer: COMMERCIAL

## 2019-08-26 VITALS
HEART RATE: 63 BPM | SYSTOLIC BLOOD PRESSURE: 132 MMHG | BODY MASS INDEX: 27.58 KG/M2 | WEIGHT: 160.8 LBS | DIASTOLIC BLOOD PRESSURE: 86 MMHG | OXYGEN SATURATION: 97 % | TEMPERATURE: 98 F

## 2019-08-26 DIAGNOSIS — J01.91 ACUTE RECURRENT SINUSITIS, UNSPECIFIED LOCATION: Primary | ICD-10-CM

## 2019-08-26 PROCEDURE — 99213 OFFICE O/P EST LOW 20 MIN: CPT | Performed by: NURSE PRACTITIONER

## 2019-08-26 RX ORDER — AMOXICILLIN AND CLAVULANATE POTASSIUM 875; 125 MG/1; MG/1
1 TABLET, FILM COATED ORAL 2 TIMES DAILY
Qty: 20 TABLET | Refills: 0 | Status: SHIPPED | OUTPATIENT
Start: 2019-08-26 | End: 2019-09-05

## 2019-08-26 ASSESSMENT — ENCOUNTER SYMPTOMS
EYE REDNESS: 0
CHOKING: 0
BLOOD IN STOOL: 0
SHORTNESS OF BREATH: 0
STRIDOR: 0
EYE PAIN: 0
EYE DISCHARGE: 0
RHINORRHEA: 0
SORE THROAT: 1
COUGH: 1
EYE ITCHING: 0
CONSTIPATION: 0
COLOR CHANGE: 0
HOARSE VOICE: 1
WHEEZING: 0
PHOTOPHOBIA: 0
DIARRHEA: 0
ABDOMINAL PAIN: 0
BACK PAIN: 0
CHEST TIGHTNESS: 0
NAUSEA: 1
TROUBLE SWALLOWING: 0
SINUS PRESSURE: 1
VOICE CHANGE: 0
VOMITING: 0
SINUS PAIN: 1

## 2020-06-02 ENCOUNTER — HOSPITAL ENCOUNTER (OUTPATIENT)
Dept: MAMMOGRAPHY | Age: 55
Discharge: HOME | End: 2020-06-02
Payer: COMMERCIAL

## 2020-06-02 PROCEDURE — 77063 BREAST TOMOSYNTHESIS BI: CPT

## 2020-06-15 RX ORDER — LEVOTHYROXINE SODIUM 88 UG/1
TABLET ORAL
Qty: 90 TABLET | Refills: 3 | Status: SHIPPED | OUTPATIENT
Start: 2020-06-15 | End: 2021-06-23

## 2020-07-01 ENCOUNTER — OFFICE VISIT (OUTPATIENT)
Dept: FAMILY MEDICINE CLINIC | Age: 55
End: 2020-07-01
Payer: COMMERCIAL

## 2020-07-01 VITALS
BODY MASS INDEX: 27.96 KG/M2 | SYSTOLIC BLOOD PRESSURE: 130 MMHG | TEMPERATURE: 98 F | HEIGHT: 64 IN | OXYGEN SATURATION: 99 % | WEIGHT: 163.8 LBS | DIASTOLIC BLOOD PRESSURE: 74 MMHG | HEART RATE: 70 BPM

## 2020-07-01 LAB
A/G RATIO: 2 (ref 1.1–2.2)
ALBUMIN SERPL-MCNC: 4.4 G/DL (ref 3.4–5)
ALP BLD-CCNC: 53 U/L (ref 40–129)
ALT SERPL-CCNC: 7 U/L (ref 10–40)
ANION GAP SERPL CALCULATED.3IONS-SCNC: 12 MMOL/L (ref 3–16)
AST SERPL-CCNC: 9 U/L (ref 15–37)
BILIRUB SERPL-MCNC: 0.5 MG/DL (ref 0–1)
BUN BLDV-MCNC: 12 MG/DL (ref 7–20)
CALCIUM SERPL-MCNC: 9.3 MG/DL (ref 8.3–10.6)
CHLORIDE BLD-SCNC: 100 MMOL/L (ref 99–110)
CHOLESTEROL, TOTAL: 249 MG/DL (ref 0–199)
CO2: 24 MMOL/L (ref 21–32)
CREAT SERPL-MCNC: <0.5 MG/DL (ref 0.6–1.1)
GFR AFRICAN AMERICAN: >60
GFR NON-AFRICAN AMERICAN: >60
GLOBULIN: 2.2 G/DL
GLUCOSE BLD-MCNC: 84 MG/DL (ref 70–99)
HDLC SERPL-MCNC: 46 MG/DL (ref 40–60)
LDL CHOLESTEROL CALCULATED: 175 MG/DL
POTASSIUM SERPL-SCNC: 3.9 MMOL/L (ref 3.5–5.1)
SODIUM BLD-SCNC: 136 MMOL/L (ref 136–145)
T4 FREE: 1.6 NG/DL (ref 0.9–1.8)
TOTAL PROTEIN: 6.6 G/DL (ref 6.4–8.2)
TRIGL SERPL-MCNC: 139 MG/DL (ref 0–150)
TSH SERPL DL<=0.05 MIU/L-ACNC: 1.11 UIU/ML (ref 0.27–4.2)
VITAMIN D 25-HYDROXY: 79.2 NG/ML
VLDLC SERPL CALC-MCNC: 28 MG/DL

## 2020-07-01 PROCEDURE — 36415 COLL VENOUS BLD VENIPUNCTURE: CPT | Performed by: FAMILY MEDICINE

## 2020-07-01 PROCEDURE — 99396 PREV VISIT EST AGE 40-64: CPT | Performed by: FAMILY MEDICINE

## 2020-07-01 RX ORDER — CHLORAL HYDRATE 500 MG
3000 CAPSULE ORAL EVERY OTHER DAY
COMMUNITY
End: 2021-07-01 | Stop reason: ALTCHOICE

## 2020-07-01 RX ORDER — AMPICILLIN TRIHYDRATE 250 MG
CAPSULE ORAL
COMMUNITY
End: 2020-08-27

## 2020-07-01 ASSESSMENT — PATIENT HEALTH QUESTIONNAIRE - PHQ9
SUM OF ALL RESPONSES TO PHQ QUESTIONS 1-9: 0
2. FEELING DOWN, DEPRESSED OR HOPELESS: 0
1. LITTLE INTEREST OR PLEASURE IN DOING THINGS: 0
SUM OF ALL RESPONSES TO PHQ QUESTIONS 1-9: 0
SUM OF ALL RESPONSES TO PHQ9 QUESTIONS 1 & 2: 0

## 2020-07-01 ASSESSMENT — ENCOUNTER SYMPTOMS
CONSTIPATION: 0
SHORTNESS OF BREATH: 0
DIARRHEA: 0

## 2020-07-01 NOTE — PROGRESS NOTES
Chief Complaint   Patient presents with    Annual Exam       HPI:  All Messer is a 60 y.o. (: 1965) here today   for   Annual physical exam. No current issues or complaints. Has been working on diet and has started red yeast rice. 5-6 mo. Having some sensitivity over thyroid for approx 6 weeks. Feels like something has changed. Sensitive to touch. No diff swallowing. Feels different when she swallows. Was more sore few mo ago. Injection in right foot for plantar fasciitis. Done yesterday. Has been doing more \"keto\" diet. Feels less bloated. Overall feeling better. Clothes fitting better. Colitis overall seems better. No recent issues. Will have some issues if eats nuts. No known hx of diverticulitis. approx 2 mo. ROS:  Review of Systems   Constitutional: Negative for fever. Respiratory: Negative for shortness of breath. Cardiovascular: Negative for chest pain and palpitations. Gastrointestinal: Negative for constipation and diarrhea. Genitourinary: Negative for difficulty urinating. [place]     LDL Calculated (mg/dL)   Date Value   2019 153 (H)       Past Medical History:   Diagnosis Date    Hyperlipidemia     Hypothyroidism     Rash     Thyroid disease     TMJ dysfunction        Family History   Problem Relation Age of Onset    Heart Disease Father     High Blood Pressure Mother     High Cholesterol Mother     Kidney Disease Mother         renal polycystic     Arthritis Mother     Early Death Paternal Aunt        Social History     Socioeconomic History    Marital status:      Spouse name: Not on file    Number of children: Not on file    Years of education: Not on file    Highest education level: Not on file   Occupational History    Not on file   Social Needs    Financial resource strain: Not on file    Food insecurity     Worry: Not on file     Inability: Not on file    Transportation needs     Medical: Not on file     Non-medical: Not on file   Tobacco Use    Smoking status: Former Smoker     Packs/day: 1.00     Years: 15.00     Pack years: 15.00     Last attempt to quit: 1991     Years since quittin.5    Smokeless tobacco: Never Used   Substance and Sexual Activity    Alcohol use: No    Drug use: No    Sexual activity: Not on file   Lifestyle    Physical activity     Days per week: Not on file     Minutes per session: Not on file    Stress: Not on file   Relationships    Social connections     Talks on phone: Not on file     Gets together: Not on file     Attends Synagogue service: Not on file     Active member of club or organization: Not on file     Attends meetings of clubs or organizations: Not on file     Relationship status: Not on file    Intimate partner violence     Fear of current or ex partner: Not on file     Emotionally abused: Not on file     Physically abused: Not on file     Forced sexual activity: Not on file   Other Topics Concern    Not on file   Social History Narrative    Not on file       Prior to Visit Medications    Medication Sig Taking? Authorizing Provider   Red Yeast Rice 600 MG CAPS Take by mouth Yes Historical Provider, MD   Omega-3 Fatty Acids (FISH OIL) 1000 MG CAPS Take 3,000 mg by mouth every other day Yes Historical Provider, MD   levothyroxine (SYNTHROID) 88 MCG tablet TAKE 1 TABLET BY MOUTH EVERY DAY Yes SUSAN Bronson CNP   estradiol (ESTRACE) 1 MG tablet Take 1 mg by mouth daily Yes Historical Provider, MD   Cholecalciferol (VITAMIN D3 PO) Take by mouth Yes Historical Provider, MD   Ascorbic Acid (VITAMIN C) 500 MG tablet Take 500 mg by mouth daily.  Yes Historical Provider, MD       No Known Allergies    OBJECTIVE:    /74   Pulse 70   Temp 98 °F (36.7 °C)   Ht 5' 4\" (1.626 m)   Wt 163 lb 12.8 oz (74.3 kg)   SpO2 99%   BMI 28.12 kg/m²     BP Readings from Last 2 Encounters:   20 130/74   19 132/86       Wt Readings from Last 3 Encounters:   20 163 lb 12.8 oz (74.3 kg)   08/26/19 160 lb 12.8 oz (72.9 kg)   03/27/19 155 lb (70.3 kg)       Physical Exam  Constitutional:       Appearance: She is well-developed. HENT:      Head: Normocephalic and atraumatic. Eyes:      Conjunctiva/sclera: Conjunctivae normal.   Neck:      Thyroid: Thyromegaly present. Trachea: No tracheal deviation. Cardiovascular:      Rate and Rhythm: Normal rate and regular rhythm. Pulmonary:      Effort: Pulmonary effort is normal.      Breath sounds: Normal breath sounds. Abdominal:      Palpations: Abdomen is soft. Tenderness: There is no abdominal tenderness. Skin:     General: Skin is warm and dry. Neurological:      Mental Status: She is alert and oriented to person, place, and time. ASSESSMENT/PLAN:    Diana Thomas was seen today for annual exam.    Diagnoses and all orders for this visit:    Well adult exam  -     Vitamin D 25 Hydroxy  Rpt labs today. O/w cont meds. Overall doing fairly well. Paperwork for wellness  Hypothyroidism, unspecified type  -     TSH without Reflex  -     T4, Free  -     Vitamin D 25 Hydroxy  -     US THYROID; Future  Rpt labs today. Given fullness to thyroid and tenderness, rpt labs. May need back to ent  Hyperlipidemia, unspecified hyperlipidemia type  -     Comprehensive Metabolic Panel  -     Lipid Panel  Taking red yeast rice. Working on diet. Thyromegaly  -     US THYROID; Future  See above. Rpt labs.

## 2020-07-02 RX ORDER — ROSUVASTATIN CALCIUM 5 MG/1
5 TABLET, COATED ORAL
Qty: 12 TABLET | Refills: 3 | Status: SHIPPED | OUTPATIENT
Start: 2020-07-03 | End: 2020-08-27 | Stop reason: SINTOL

## 2020-07-02 NOTE — RESULT ENCOUNTER NOTE
Vitamin D is normal.  Thyroid labs are normal, metabolic panel essentially normal.  Cholesterol is worse than last check. I would again recommend considering a statin. Let me know if you are interested.

## 2020-08-27 ENCOUNTER — NURSE TRIAGE (OUTPATIENT)
Dept: OTHER | Facility: CLINIC | Age: 55
End: 2020-08-27

## 2020-08-27 ENCOUNTER — OFFICE VISIT (OUTPATIENT)
Dept: FAMILY MEDICINE CLINIC | Age: 55
End: 2020-08-27
Payer: COMMERCIAL

## 2020-08-27 VITALS
DIASTOLIC BLOOD PRESSURE: 82 MMHG | OXYGEN SATURATION: 98 % | BODY MASS INDEX: 28.13 KG/M2 | HEART RATE: 74 BPM | SYSTOLIC BLOOD PRESSURE: 118 MMHG | HEIGHT: 64 IN | TEMPERATURE: 97.2 F | WEIGHT: 164.8 LBS

## 2020-08-27 PROCEDURE — 99213 OFFICE O/P EST LOW 20 MIN: CPT | Performed by: FAMILY MEDICINE

## 2020-08-27 RX ORDER — SULFAMETHOXAZOLE AND TRIMETHOPRIM 800; 160 MG/1; MG/1
1 TABLET ORAL 2 TIMES DAILY
Qty: 20 TABLET | Refills: 0 | Status: SHIPPED | OUTPATIENT
Start: 2020-08-27 | End: 2021-11-05 | Stop reason: SDUPTHER

## 2020-08-27 RX ORDER — VALACYCLOVIR HYDROCHLORIDE 1 G/1
1000 TABLET, FILM COATED ORAL 3 TIMES DAILY
Qty: 21 TABLET | Refills: 0 | Status: SHIPPED | OUTPATIENT
Start: 2020-08-27 | End: 2020-09-03

## 2020-08-27 RX ORDER — HYDROXYZINE HYDROCHLORIDE 25 MG/1
25 TABLET, FILM COATED ORAL EVERY 8 HOURS PRN
Qty: 30 TABLET | Refills: 0 | Status: SHIPPED | OUTPATIENT
Start: 2020-08-27 | End: 2020-09-06

## 2020-08-27 NOTE — PROGRESS NOTES
Chief Complaint   Patient presents with    Rash       HPI:  Unknown Melissa is a 60 y.o. (: 1965) here today   for rash on left leg. Rash has been present for about a week. Began w/ what she thought was mosquito bites last week x 2. Post left leg. Then more of a rash. Seen by massage therapist on Monday. ? \"spider bite. \"  Placed epsom salt w/cotton ball. Seemed to improve. Tried soaking in epsom salts last pm.  Awoke w/ sig itch this am.  Inc redness noted. No others w/ rash at home. Did stay at hotel not long ago. No known bed bugs.  does not have rash. Typically wears night gown to bed. Was taking chol med. Caused palpitations. Did not tolerate. HPI    Patient's medications, allergies, past medical, surgical, social and family histories were reviewed and updated asappropriate. ROS:  Review of Systems   Constitutional: Negative for fever. Skin: Positive for rash. Prior to Visit Medications    Medication Sig Taking? Authorizing Provider   valACYclovir (VALTREX) 1 g tablet Take 1 tablet by mouth 3 times daily for 7 days Yes Mark Dennis MD   hydrOXYzine (ATARAX) 25 MG tablet Take 1 tablet by mouth every 8 hours as needed for Itching Yes Mark Dennis MD   sulfamethoxazole-trimethoprim (BACTRIM DS) 800-160 MG per tablet Take 1 tablet by mouth 2 times daily for 10 days Yes Mark Dennis MD   Omega-3 Fatty Acids (FISH OIL) 1000 MG CAPS Take 3,000 mg by mouth every other day Yes Historical Provider, MD   levothyroxine (SYNTHROID) 88 MCG tablet TAKE 1 TABLET BY MOUTH EVERY DAY Yes SUSAN Terrazas - CNP   estradiol (ESTRACE) 1 MG tablet Take 1 mg by mouth daily Yes Historical Provider, MD   Cholecalciferol (VITAMIN D3 PO) Take by mouth Yes Historical Provider, MD   Ascorbic Acid (VITAMIN C) 500 MG tablet Take 500 mg by mouth daily.  Yes Historical Provider, MD       No Known Allergies    OBJECTIVE:    /82   Pulse 74   Temp 97.2 °F (36.2 °C)   Ht 5' 4\" (1.626 m)   Wt 164 lb 12.8 oz (74.8 kg)   SpO2 98%   BMI 28.29 kg/m²     BP Readings from Last 2 Encounters:   08/27/20 118/82   07/01/20 130/74       Wt Readings from Last 3 Encounters:   08/27/20 164 lb 12.8 oz (74.8 kg)   07/01/20 163 lb 12.8 oz (74.3 kg)   08/26/19 160 lb 12.8 oz (72.9 kg)       Physical Exam  Constitutional:       Appearance: Normal appearance. HENT:      Head: Normocephalic and atraumatic. Eyes:      Extraocular Movements: Extraocular movements intact. Cardiovascular:      Rate and Rhythm: Normal rate and regular rhythm. Pulmonary:      Effort: Pulmonary effort is normal.      Breath sounds: Normal breath sounds. Skin:     Findings: Rash present. Neurological:      General: No focal deficit present. Mental Status: She is alert and oriented to person, place, and time. Psychiatric:         Mood and Affect: Mood normal.               ASSESSMENT/PLAN:     1. Rash  Possible shingles based on appearance and distribution. meds as below. Med for itch as well. Cautioned about assoc drowsiness. Call if fails to improve.   - valACYclovir (VALTREX) 1 g tablet; Take 1 tablet by mouth 3 times daily for 7 days  Dispense: 21 tablet; Refill: 0  - hydrOXYzine (ATARAX) 25 MG tablet; Take 1 tablet by mouth every 8 hours as needed for Itching  Dispense: 30 tablet; Refill: 0  - sulfamethoxazole-trimethoprim (BACTRIM DS) 800-160 MG per tablet; Take 1 tablet by mouth 2 times daily for 10 days  Dispense: 20 tablet;  Refill: 0

## 2021-03-13 ENCOUNTER — TELEPHONE (OUTPATIENT)
Dept: FAMILY MEDICINE CLINIC | Age: 56
End: 2021-03-13

## 2021-03-13 DIAGNOSIS — Z20.822 SUSPECTED COVID-19 VIRUS INFECTION: Primary | ICD-10-CM

## 2021-03-13 RX ORDER — METHYLPREDNISOLONE 4 MG/1
TABLET ORAL
Qty: 1 KIT | Refills: 0 | Status: SHIPPED | OUTPATIENT
Start: 2021-03-13 | End: 2021-03-19

## 2021-03-13 RX ORDER — ALBUTEROL SULFATE 90 UG/1
2 AEROSOL, METERED RESPIRATORY (INHALATION) 4 TIMES DAILY PRN
Qty: 1 INHALER | Refills: 5 | Status: SHIPPED | OUTPATIENT
Start: 2021-03-13 | End: 2021-07-01 | Stop reason: ALTCHOICE

## 2021-03-13 NOTE — TELEPHONE ENCOUNTER
On call communication:  Pt reports covid sxs for the last week. Patient's  tested positive. Patient has not been tested. She has consistently had  fever. Feels ok if she has tylenol or ibuprofen, has to take around the clock. Experiencing chest tightness with productive cough. Has been monitoring O2 sat averaging 93-95%  Yesterday with diarrhea. Feels thirsty. Able to tolerate liquids, has difficulty with food. Still producing urine. Discussed viral nature of covid and supportive care. I will send in steroid and albuterol inhaler to the pharmacy. Patient is to continue to check o2 sats and go to ED for anything lower than 92%. Also instructed to present with chest pain, worsening soa, decreased UOP. Recommended close follow up with PCP, virtually or [facility] early next week.

## 2021-03-15 ENCOUNTER — HOSPITAL ENCOUNTER (EMERGENCY)
Age: 56
Discharge: HOME | End: 2021-03-15
Attending: EMERGENCY MEDICINE
Payer: COMMERCIAL

## 2021-03-15 ENCOUNTER — APPOINTMENT (OUTPATIENT)
Dept: GENERAL RADIOLOGY | Age: 56
End: 2021-03-15
Payer: COMMERCIAL

## 2021-03-15 VITALS
WEIGHT: 164 LBS | SYSTOLIC BLOOD PRESSURE: 134 MMHG | HEART RATE: 79 BPM | DIASTOLIC BLOOD PRESSURE: 80 MMHG | HEIGHT: 64 IN | RESPIRATION RATE: 20 BRPM | OXYGEN SATURATION: 95 % | BODY MASS INDEX: 28 KG/M2 | TEMPERATURE: 98.4 F

## 2021-03-15 DIAGNOSIS — Z20.822 COVID-19 VIRUS TEST RESULT UNKNOWN: ICD-10-CM

## 2021-03-15 DIAGNOSIS — J18.9 PNEUMONIA DUE TO ORGANISM: Primary | ICD-10-CM

## 2021-03-15 LAB
ANION GAP SERPL CALCULATED.3IONS-SCNC: 11 MMOL/L (ref 3–16)
BASE EXCESS VENOUS: 4.6 MMOL/L (ref -2–3)
BASOPHILS ABSOLUTE: 0 K/UL (ref 0–0.2)
BASOPHILS RELATIVE PERCENT: 0.1 %
BUN BLDV-MCNC: 10 MG/DL (ref 7–20)
CALCIUM SERPL-MCNC: 8.6 MG/DL (ref 8.3–10.6)
CARBOXYHEMOGLOBIN: 1 % (ref 0–1.5)
CHLORIDE BLD-SCNC: 97 MMOL/L (ref 99–110)
CO2: 26 MMOL/L (ref 21–32)
CREAT SERPL-MCNC: <0.5 MG/DL (ref 0.6–1.1)
EKG ATRIAL RATE: 77 BPM
EKG DIAGNOSIS: NORMAL
EKG P AXIS: 49 DEGREES
EKG P-R INTERVAL: 144 MS
EKG Q-T INTERVAL: 364 MS
EKG QRS DURATION: 84 MS
EKG QTC CALCULATION (BAZETT): 411 MS
EKG R AXIS: -6 DEGREES
EKG T AXIS: 30 DEGREES
EKG VENTRICULAR RATE: 77 BPM
EOSINOPHILS ABSOLUTE: 0 K/UL (ref 0–0.6)
EOSINOPHILS RELATIVE PERCENT: 0 %
GFR AFRICAN AMERICAN: >60
GFR NON-AFRICAN AMERICAN: >60
GLUCOSE BLD-MCNC: 109 MG/DL (ref 70–99)
HCO3 VENOUS: 29.9 MMOL/L (ref 24–28)
HCT VFR BLD CALC: 40.5 % (ref 36–48)
HEMOGLOBIN: 13.9 G/DL (ref 12–16)
LYMPHOCYTES ABSOLUTE: 0.6 K/UL (ref 1–5.1)
LYMPHOCYTES RELATIVE PERCENT: 14.9 %
MCH RBC QN AUTO: 31.4 PG (ref 26–34)
MCHC RBC AUTO-ENTMCNC: 34.3 G/DL (ref 31–36)
MCV RBC AUTO: 91.5 FL (ref 80–100)
METHEMOGLOBIN VENOUS: 0.4 % (ref 0–1.5)
MONOCYTES ABSOLUTE: 0.4 K/UL (ref 0–1.3)
MONOCYTES RELATIVE PERCENT: 10.4 %
NEUTROPHILS ABSOLUTE: 2.9 K/UL (ref 1.7–7.7)
NEUTROPHILS RELATIVE PERCENT: 74.6 %
O2 SAT, VEN: 60 %
PCO2, VEN: 45.4 MMHG (ref 41–51)
PDW BLD-RTO: 11.9 % (ref 12.4–15.4)
PH VENOUS: 7.43 (ref 7.35–7.45)
PLATELET # BLD: 107 K/UL (ref 135–450)
PMV BLD AUTO: 9.4 FL (ref 5–10.5)
PO2, VEN: 31.1 MMHG (ref 25–40)
POTASSIUM REFLEX MAGNESIUM: 3.7 MMOL/L (ref 3.5–5.1)
PRO-BNP: 168 PG/ML (ref 0–124)
RBC # BLD: 4.43 M/UL (ref 4–5.2)
SODIUM BLD-SCNC: 134 MMOL/L (ref 136–145)
TCO2 CALC VENOUS: 31 MMOL/L
TROPONIN: <0.01 NG/ML
WBC # BLD: 3.9 K/UL (ref 4–11)

## 2021-03-15 PROCEDURE — 93005 ELECTROCARDIOGRAM TRACING: CPT | Performed by: STUDENT IN AN ORGANIZED HEALTH CARE EDUCATION/TRAINING PROGRAM

## 2021-03-15 PROCEDURE — 80048 BASIC METABOLIC PNL TOTAL CA: CPT

## 2021-03-15 PROCEDURE — 82803 BLOOD GASES ANY COMBINATION: CPT

## 2021-03-15 PROCEDURE — 84484 ASSAY OF TROPONIN QUANT: CPT

## 2021-03-15 PROCEDURE — 71045 X-RAY EXAM CHEST 1 VIEW: CPT

## 2021-03-15 PROCEDURE — U0003 INFECTIOUS AGENT DETECTION BY NUCLEIC ACID (DNA OR RNA); SEVERE ACUTE RESPIRATORY SYNDROME CORONAVIRUS 2 (SARS-COV-2) (CORONAVIRUS DISEASE [COVID-19]), AMPLIFIED PROBE TECHNIQUE, MAKING USE OF HIGH THROUGHPUT TECHNOLOGIES AS DESCRIBED BY CMS-2020-01-R: HCPCS

## 2021-03-15 PROCEDURE — 83880 ASSAY OF NATRIURETIC PEPTIDE: CPT

## 2021-03-15 PROCEDURE — 85025 COMPLETE CBC W/AUTO DIFF WBC: CPT

## 2021-03-15 PROCEDURE — 99283 EMERGENCY DEPT VISIT LOW MDM: CPT

## 2021-03-15 PROCEDURE — 6370000000 HC RX 637 (ALT 250 FOR IP): Performed by: STUDENT IN AN ORGANIZED HEALTH CARE EDUCATION/TRAINING PROGRAM

## 2021-03-15 RX ORDER — IBUPROFEN 400 MG/1
800 TABLET ORAL ONCE
Status: COMPLETED | OUTPATIENT
Start: 2021-03-15 | End: 2021-03-15

## 2021-03-15 RX ORDER — AZITHROMYCIN 250 MG/1
250 TABLET, FILM COATED ORAL SEE ADMIN INSTRUCTIONS
Qty: 6 TABLET | Refills: 0 | Status: SHIPPED | OUTPATIENT
Start: 2021-03-15 | End: 2021-03-20

## 2021-03-15 RX ADMIN — IBUPROFEN 800 MG: 400 TABLET, FILM COATED ORAL at 16:40

## 2021-03-15 ASSESSMENT — ENCOUNTER SYMPTOMS
COUGH: 1
CHEST TIGHTNESS: 1
DIARRHEA: 0
RHINORRHEA: 1
ABDOMINAL PAIN: 0
CONSTIPATION: 0
WHEEZING: 0
VOMITING: 0
SORE THROAT: 0
NAUSEA: 0
EYES NEGATIVE: 1
SHORTNESS OF BREATH: 1

## 2021-03-15 ASSESSMENT — PAIN DESCRIPTION - LOCATION: LOCATION: CHEST

## 2021-03-15 ASSESSMENT — PAIN DESCRIPTION - PAIN TYPE: TYPE: ACUTE PAIN

## 2021-03-15 NOTE — ED PROVIDER NOTES
ED Attending Attestation Note     Date of evaluation: 3/15/2021    This patient was seen by the resident. I have seen and examined the patient, agree with the workup, evaluation, management and diagnosis. The care plan has been discussed. I have reviewed the ECG and concur with the resident's interpretation. My assessment reveals a woman who is awake and alert, no acute distress. Strong suspicion for Covid, as her  is 12 days after diagnosis, and she started with symptoms shortly after his diagnosis. They have a home pulse oximeter, with reading never lower than 90%.          Kashmir Franco MD  03/15/21 [id number]

## 2021-03-15 NOTE — ED TRIAGE NOTES
Pt here today for c/o body aches, SOB and chest pressure. Pt states that her  had Covid states she started to have symptoms 3 days after her . Pt state her  was dx on the 9th of March.

## 2021-03-15 NOTE — ED PROVIDER NOTES
[facility] RESIDENT NOTE       Date of evaluation: 3/15/2021    Chief Complaint     Shortness of Breath and Generalized Body Aches      History of Present Illness     Rosalia Daniels is a 60 y.o. female who presents with a past medical history of hypothyroidism presenting to the emergency department with worsening shortness of breath. The patient states that her  tested positive for Covid on , and she started having symptoms of fever, chills, and worsening shortness of breath on the . Since then she has had worsening difficulties with breathing, feeling like she cannot catch her breath even when resting. She states she has a pulse ox at home, which has maintained above 92% during this time. Review of Systems     Review of Systems   Constitutional: Positive for chills and fever. HENT: Positive for rhinorrhea. Negative for congestion and sore throat. Eyes: Negative. Respiratory: Positive for cough, chest tightness and shortness of breath. Negative for wheezing. Cardiovascular: Negative for chest pain and leg swelling. Gastrointestinal: Negative for abdominal pain, constipation, diarrhea, nausea and vomiting. Genitourinary: Negative. Musculoskeletal: Negative. Skin: Negative. Neurological: Negative. Hematological: Negative. Past Medical, Surgical, Family, and Social History     She has a past medical history of Hyperlipidemia, Hypothyroidism, Rash, Thyroid disease, and TMJ dysfunction. She has a past surgical history that includes hernia repair;  section; Hysterectomy; Abdomen surgery; Cholecystectomy, laparoscopic (1/10/11); Colonoscopy (12); Hysterectomy, total abdominal; and Hysterectomy, vaginal.  Her family history includes Arthritis in her mother; Early Death in her paternal aunt;  Heart Disease in her father; High Blood Pressure in her mother; High Cholesterol in her mother; Kidney Disease in her mother. She reports that she quit smoking about 30 years ago. She has a 15.00 pack-year smoking history. She has never used smokeless tobacco. She reports that she does not drink alcohol or use drugs. Medications     Previous Medications    ALBUTEROL SULFATE HFA (VENTOLIN HFA) 108 (90 BASE) MCG/ACT INHALER    Inhale 2 puffs into the lungs 4 times daily as needed for Wheezing    ASCORBIC ACID (VITAMIN C) 500 MG TABLET    Take 500 mg by mouth daily. CHOLECALCIFEROL (VITAMIN D3 PO)    Take by mouth    ESTRADIOL (ESTRACE) 1 MG TABLET    Take 1 mg by mouth daily    LEVOTHYROXINE (SYNTHROID) 88 MCG TABLET    TAKE 1 TABLET BY MOUTH EVERY DAY    METHYLPREDNISOLONE (MEDROL, CARO,) 4 MG TABLET    Take as directed for 6 days. OMEGA-3 FATTY ACIDS (FISH OIL) 1000 MG CAPS    Take 3,000 mg by mouth every other day       Allergies     She has No Known Allergies. Physical Exam     INITIAL VITALS: BP: 134/80, Temp: 98.4 °F (36.9 °C), Pulse: 79, Resp: 20, SpO2: 95 %   Physical Exam  Constitutional:       General: She is not in acute distress. Appearance: She is ill-appearing. She is not toxic-appearing. Comments: Tearful but in no acute distress. HENT:      Head: Normocephalic and atraumatic. Mouth/Throat:      Mouth: Mucous membranes are moist.      Pharynx: Oropharynx is clear. No pharyngeal swelling or oropharyngeal exudate. Eyes:      Extraocular Movements: Extraocular movements intact. Pupils: Pupils are equal, round, and reactive to light. Neck:      Musculoskeletal: Normal range of motion. Cardiovascular:      Rate and Rhythm: Normal rate and regular rhythm. Pulmonary:      Effort: Pulmonary effort is normal. Tachypnea present. No bradypnea, accessory muscle usage or respiratory distress. Breath sounds: No decreased breath sounds, wheezing or rhonchi. Comments: Coarse breath sounds throughout all lung fields  Abdominal:      Palpations: Abdomen is soft.  There is 0.0 %    Basophils % 0.1 %    Neutrophils Absolute 2.9 1.7 - 7.7 K/uL    Lymphocytes Absolute 0.6 (L) 1.0 - 5.1 K/uL    Monocytes Absolute 0.4 0.0 - 1.3 K/uL    Eosinophils Absolute 0.0 0.0 - 0.6 K/uL    Basophils Absolute 0.0 0.0 - 0.2 K/uL   Basic Metabolic Panel w/ Reflex to MG   Result Value Ref Range    Sodium 134 (L) 136 - 145 mmol/L    Potassium reflex Magnesium 3.7 3.5 - 5.1 mmol/L    Chloride 97 (L) 99 - 110 mmol/L    CO2 26 21 - 32 mmol/L    Anion Gap 11 3 - 16    Glucose 109 (H) 70 - 99 mg/dL    BUN 10 7 - 20 mg/dL    CREATININE <0.5 (L) 0.6 - 1.1 mg/dL    GFR Non-African American >60 >60    GFR African American >60 >60    Calcium 8.6 8.3 - 10.6 mg/dL   Brain Natriuretic Peptide   Result Value Ref Range    Pro- (H) 0 - 124 pg/mL   Troponin   Result Value Ref Range    Troponin <0.01 <0.01 ng/mL   EKG 12 Lead   Result Value Ref Range    Ventricular Rate 77 BPM    Atrial Rate 77 BPM    P-R Interval 144 ms    QRS Duration 84 ms    Q-T Interval 364 ms    QTc Calculation (Bazett) 411 ms    P Axis 49 degrees    R Axis -6 degrees    T Axis 30 degrees    Diagnosis       EKG performed in ER and to be interpreted by ER physician. Confirmed by MD, ER ([id number]),  Johnathon Michel ([id number]) on 3/15/2021 4:23:55 PM       RECENT VITALS:  BP: 134/80, Temp: 98.4 °F (36.9 °C), Pulse: 79,Resp: 20, SpO2: 95 %     CURB-65 Score: 0    Procedures       ED Course     Nursing Notes, Past Medical Hx, Past Surgical Hx, Social Hx, Allergies, and Family Hx were reviewed.     The patient was given the followingmedications:  Orders Placed This Encounter   Medications    ibuprofen (ADVIL;MOTRIN) tablet 800 mg    azithromycin (ZITHROMAX) 250 MG tablet     Sig: Take 1 tablet by mouth See Admin Instructions for 5 days 500mg on day 1 followed by 250mg on days 2 - 5     Dispense:  6 tablet     Refill:  0       CONSULTS:  None    MEDICAL DECISION MAKING / ASSESSMENT / Lesly Clubs is a 60 y.o. female presenting with shortness of breath worsening over the past week. Although was felt likely that the patient's symptoms were due to Covid in the setting of exposure in her home, a full cardiac work-up and pulmonary work-up were initiated due to the patient symptoms. Chest x-ray was concerning for a consolidation in the right upper lobe, which could represent a superimposed bacterial pneumonia. BNP was elevated, but not consistently elevated for heart failure exacerbation is more likely related to strain due to pulmonary infiltrates. Troponin was negative, making ACS less likely cause for the patient symptoms. The patient remained greater than 92% while at rest during her stay, and her walking pulse ox test remained above 95%. At this time, is felt appropriate to discharge the patient home with likely Covid pneumonia and possible superimposed bacterial pneumonia. The patient's curb 65 score was 0, making this a low risk patient to discharge. The patient was in close contact with her primary care provider, would continue to monitor her oxygen saturations with her home pulse ox, is also discharged with a Z-Niranjan for possible bacterial pneumonia. At this time, is felt appropriate to discharge the patient home. The patient was given strict return precautions, and patient verbalized understanding prior to discharge. This patient was also evaluated by the attending physician. All care plans werediscussed and agreed upon. Clinical Impression     1. Pneumonia due to organism    2.  COVID-19 virus test result unknown        Disposition     PATIENT REFERRED TO:  Wing Landon MD  [address] [facility] [place]  [phone]    Schedule an appointment as soon as possible for a visit in 2 weeks  As needed for emergency department follow up      DISCHARGE MEDICATIONS:  New Prescriptions    AZITHROMYCIN (ZITHROMAX) 250 MG TABLET    Take 1 tablet by mouth See Admin Instructions for 5 days 500mg on day 1 followed by 250mg on days 2 - 5 Daphne Lopez MD  03/15/21 [id number]

## 2021-03-16 ENCOUNTER — TELEPHONE (OUTPATIENT)
Dept: FAMILY MEDICINE CLINIC | Age: 56
End: 2021-03-16

## 2021-03-16 ENCOUNTER — CARE COORDINATION (OUTPATIENT)
Dept: CARE COORDINATION | Age: 56
End: 2021-03-16

## 2021-03-16 LAB — SARS-COV-2, PCR: DETECTED

## 2021-03-16 RX ORDER — ONDANSETRON 4 MG/1
4 TABLET, FILM COATED ORAL 3 TIMES DAILY PRN
Qty: 20 TABLET | Refills: 0 | Status: SHIPPED | OUTPATIENT
Start: 2021-03-16 | End: 2021-07-01 | Stop reason: ALTCHOICE

## 2021-03-16 NOTE — CARE COORDINATION
ER follow up call completed with patient. ER visit on 3/15/21 for viral pneumonia, covid pending. Her  has covid currently. Patient is feeling ill today and reports to me several symptoms. She has a pulse oximeter, currently 94% at this time on RA. Reviewed s/s of covid and aftercare. Plan to enroll in get well loop. Patient has been active speaking to her provider and plans to follow up with PCP. Patient contacted regarding COVID-19 exposure. Discussed COVID-19 related testing which was pending at this time. Test results were pending. Patient informed of results, if available? NA    Ambulatory Care Manager contacted the patient by telephone to perform post discharge assessment. Call within 2 business days of discharge: Yes. Verified name and  with patient as identifiers. Provided introduction to self, and explanation of the CTN/ACM role, and reason for call due to risk factors for infection and/or exposure to COVID-19. Symptoms reviewed with patient who verbalized the following symptoms: fever, fatigue, pain or aching joints, cough, shortness of breath, chills or shaking and sweating, nausea, diarrhea,poor appetite. Due to no new or worsening symptoms encounter was routed to provider for escalation. Discussed follow-up appointments. If no appointment was previously scheduled, appointment scheduling offered: Plans to contact PCP for follow up. ACM to forward note to provider as well. [facility] follow up appointment(s): No future appointments. Non-[facility] follow up appointment(s): NA    Non-face-to-face services provided:  Obtained and reviewed discharge summary and/or continuity of care documents  Reviewed and followed up on pending diagnostic tests and treatments-covid pending  Education of patient/family/caregiver/guardian to support self-management-covid education provided. Educated on get well loop. Additonal resources sent via my chart. Discharge meds reviewed.  Reviewed s/s to return to

## 2021-03-16 NOTE — TELEPHONE ENCOUNTER
Patient  called to ask for something for patient nausea w/ zpak. She was told by [facility] to contact our office.  Uses tien Hernandez

## 2021-03-19 ENCOUNTER — CARE COORDINATION (OUTPATIENT)
Dept: CARE COORDINATION | Age: 56
End: 2021-03-19

## 2021-03-19 NOTE — CARE COORDINATION
Date/Time:  3/19/2021 10:48 AM    Patient contacted regarding remote symptom monitoring program alert or comment received. Verified patients name and  as identifiers. Discussed COVID-19 related testing which was available at this time. Test results were positive. Patient informed of results, if available? Yes    RN contacted the patient by telephone regarding yellow alert in Loop remote symptom monitoring program.    Patient reported the following symptoms: fatigue, cough, no new symptoms and no worsening symptoms. Due to continued symptoms, patient was advised to self-monitor symptoms at home. Due to no new or worsening symptoms encounter was not routed to provider for escalation. Education provided regarding infection prevention, and signs and symptoms of COVID-19 and when to seek medical attention with patient who verbalized understanding. Discussed exposure protocols and quarantine from [address] you at higher risk for severe illness  and given an opportunity for questions and concerns. The patient agrees to contact the [facility] exposure line [phone], local health department [place] Department of Health: ([phone]) and PCP office for questions related to their healthcare. From CDC: Are you at higher risk for severe illness?  Wash your hands often.  Avoid close contact (6 feet, which is about two arm lengths) with people who are sick.  Put distance between yourself and other people if COVID-19 is spreading in your community.  Clean and disinfect frequently touched surfaces.  Avoid all cruise travel and non-essential air travel.  Call your healthcare professional if you have concerns about COVID-19 and your underlying condition or if you are sick. For more information on steps you can take to protect yourself, see CDC's How to Protect Yourself      Patient will continue to self report symptoms using Loop self monitoring. Patient has RN's contact information. Pt wrote in with a question:    mary, 9:49 AM  Can you tell me if my covid test was positive? RN Response in addition to calling patient:    Sheela Kimbrough RN, 10:47 AM  Hi Colt Robles, Thank you for speaking with me over the phone today. Please don't hesitate to call back with any questions/concerns. The contact information for the [address] is: [phone]. I hope you feel better soon!  Thank you, Dennise Garcia

## 2021-03-19 NOTE — CARE COORDINATION
Yellow alert noted in Loop remote symptom monitoring program. Messaged patient to notify Adal Mensah if symptoms have worsened since yesterday. RN Response in Loop:    Hello, my name is Nolan Shah RN with the [facility] [address] [place] [address] Care Team. Thank you for reporting your symptoms. Do you feel that your symptoms have worsened today from yesterday? Make sure you are drinking plenty of water, eating nutritious meals, and getting plenty of rest. Continue to quarantine at home if you are covid+ or awaiting test results. You may call me at [phone] if you need to speak with a nurse. If you do not have a primary care provider, you can call [phone] to schedule a virtual visit with one. We also have our [facility]) Nurse Triage Line available 24/7 at: [phone]. Thank you, Usama Wall.

## 2021-06-23 RX ORDER — LEVOTHYROXINE SODIUM 88 UG/1
TABLET ORAL
Qty: 90 TABLET | Refills: 0 | Status: SHIPPED | OUTPATIENT
Start: 2021-06-23 | End: 2021-09-14

## 2021-06-23 NOTE — LETTER
[address]  [address] [facility]  Phone: [phone]  Fax: [address] [address], APRN - CNP        June 23, 2021    Mirtha Hernando  [facility] [address]        Dear Vinny Pickering: We recently received a refill request for your medications and upon review you are due for a routine check up. Please contact our office to schedule. If you have any questions or concerns, please don't hesitate to call.       Sincerely,        ANGELICA Oliver

## 2021-06-28 ENCOUNTER — TELEPHONE (OUTPATIENT)
Dept: FAMILY MEDICINE CLINIC | Age: 56
End: 2021-06-28

## 2021-06-28 ENCOUNTER — HOSPITAL ENCOUNTER (OUTPATIENT)
Dept: MAMMOGRAPHY | Age: 56
Discharge: HOME | End: 2021-06-28
Payer: COMMERCIAL

## 2021-06-28 ENCOUNTER — HOSPITAL ENCOUNTER (OUTPATIENT)
Dept: ULTRASOUND IMAGING | Age: 56
Discharge: HOME | End: 2021-06-28
Payer: COMMERCIAL

## 2021-06-28 DIAGNOSIS — E03.9 HYPOTHYROIDISM, UNSPECIFIED TYPE: ICD-10-CM

## 2021-06-28 DIAGNOSIS — E78.5 HYPERLIPIDEMIA, UNSPECIFIED HYPERLIPIDEMIA TYPE: Primary | ICD-10-CM

## 2021-06-28 DIAGNOSIS — Z12.31 BREAST CANCER SCREENING BY MAMMOGRAM: ICD-10-CM

## 2021-06-28 DIAGNOSIS — E01.0 THYROMEGALY: ICD-10-CM

## 2021-06-28 PROCEDURE — 77063 BREAST TOMOSYNTHESIS BI: CPT

## 2021-06-28 PROCEDURE — 76536 US EXAM OF HEAD AND NECK: CPT

## 2021-06-28 NOTE — RESULT ENCOUNTER NOTE
Thyroid nodule noted. Recommend ENT referral for possible aspiration/biopsy.   Okay to either place that referral now or can discuss further at upcoming appointment

## 2021-06-28 NOTE — TELEPHONE ENCOUNTER
Patient is coming in tomorrow morning for labs. Please advise what patient needs. She has an appointment on 7/14 to discuss results and a check up.

## 2021-06-28 NOTE — TELEPHONE ENCOUNTER
Recommend CBC, CMP, lipids, TSH, free T4.   She had a low white blood cell count and low platelets a few months ago

## 2021-06-29 ENCOUNTER — NURSE ONLY (OUTPATIENT)
Dept: FAMILY MEDICINE CLINIC | Age: 56
End: 2021-06-29
Payer: COMMERCIAL

## 2021-06-29 DIAGNOSIS — E03.9 HYPOTHYROIDISM, UNSPECIFIED TYPE: ICD-10-CM

## 2021-06-29 DIAGNOSIS — E78.5 HYPERLIPIDEMIA, UNSPECIFIED HYPERLIPIDEMIA TYPE: ICD-10-CM

## 2021-06-29 LAB
A/G RATIO: 1.9 (ref 1.1–2.2)
ALBUMIN SERPL-MCNC: 4.1 G/DL (ref 3.4–5)
ALP BLD-CCNC: 53 U/L (ref 40–129)
ALT SERPL-CCNC: 6 U/L (ref 10–40)
ANION GAP SERPL CALCULATED.3IONS-SCNC: 14 MMOL/L (ref 3–16)
ANTI-THYROGLOB ABS: 15 IU/ML
AST SERPL-CCNC: 10 U/L (ref 15–37)
BASOPHILS ABSOLUTE: 0 K/UL (ref 0–0.2)
BASOPHILS RELATIVE PERCENT: 0.6 %
BILIRUB SERPL-MCNC: 0.3 MG/DL (ref 0–1)
BUN BLDV-MCNC: 11 MG/DL (ref 7–20)
CALCIUM SERPL-MCNC: 8.7 MG/DL (ref 8.3–10.6)
CHLORIDE BLD-SCNC: 103 MMOL/L (ref 99–110)
CHOLESTEROL, TOTAL: 244 MG/DL (ref 0–199)
CO2: 22 MMOL/L (ref 21–32)
CREAT SERPL-MCNC: 0.6 MG/DL (ref 0.6–1.1)
EOSINOPHILS ABSOLUTE: 0.1 K/UL (ref 0–0.6)
EOSINOPHILS RELATIVE PERCENT: 3.1 %
GFR AFRICAN AMERICAN: >60
GFR NON-AFRICAN AMERICAN: >60
GLOBULIN: 2.2 G/DL
GLUCOSE BLD-MCNC: 87 MG/DL (ref 70–99)
HCT VFR BLD CALC: 40.9 % (ref 36–48)
HDLC SERPL-MCNC: 43 MG/DL (ref 40–60)
HEMOGLOBIN: 14 G/DL (ref 12–16)
LDL CHOLESTEROL CALCULATED: 153 MG/DL
LYMPHOCYTES ABSOLUTE: 1.4 K/UL (ref 1–5.1)
LYMPHOCYTES RELATIVE PERCENT: 29.9 %
MCH RBC QN AUTO: 31.9 PG (ref 26–34)
MCHC RBC AUTO-ENTMCNC: 34.3 G/DL (ref 31–36)
MCV RBC AUTO: 92.8 FL (ref 80–100)
MONOCYTES ABSOLUTE: 0.4 K/UL (ref 0–1.3)
MONOCYTES RELATIVE PERCENT: 8.6 %
NEUTROPHILS ABSOLUTE: 2.7 K/UL (ref 1.7–7.7)
NEUTROPHILS RELATIVE PERCENT: 57.8 %
PDW BLD-RTO: 12.2 % (ref 12.4–15.4)
PLATELET # BLD: 156 K/UL (ref 135–450)
PMV BLD AUTO: 10.8 FL (ref 5–10.5)
POTASSIUM SERPL-SCNC: 3.8 MMOL/L (ref 3.5–5.1)
RBC # BLD: 4.4 M/UL (ref 4–5.2)
SODIUM BLD-SCNC: 139 MMOL/L (ref 136–145)
T4 FREE: 1.4 NG/DL (ref 0.9–1.8)
TOTAL PROTEIN: 6.3 G/DL (ref 6.4–8.2)
TRIGL SERPL-MCNC: 238 MG/DL (ref 0–150)
TSH SERPL DL<=0.05 MIU/L-ACNC: 2.7 UIU/ML (ref 0.27–4.2)
VLDLC SERPL CALC-MCNC: 48 MG/DL
WBC # BLD: 4.6 K/UL (ref 4–11)

## 2021-06-29 PROCEDURE — 36415 COLL VENOUS BLD VENIPUNCTURE: CPT | Performed by: FAMILY MEDICINE

## 2021-06-30 NOTE — RESULT ENCOUNTER NOTE
Thyroid labs normal thus far. Lipids still elevated, but LDL improved from last check. Can discuss further at upcoming appointment.   CBC and CMP essentially normal

## 2021-07-01 ENCOUNTER — OFFICE VISIT (OUTPATIENT)
Dept: ENT CLINIC | Age: 56
End: 2021-07-01
Payer: COMMERCIAL

## 2021-07-01 VITALS
HEIGHT: 64 IN | WEIGHT: 169.4 LBS | DIASTOLIC BLOOD PRESSURE: 83 MMHG | SYSTOLIC BLOOD PRESSURE: 136 MMHG | TEMPERATURE: 97.5 F | BODY MASS INDEX: 28.92 KG/M2 | HEART RATE: 77 BPM

## 2021-07-01 DIAGNOSIS — E04.1 THYROID NODULE: Primary | ICD-10-CM

## 2021-07-01 PROCEDURE — 99214 OFFICE O/P EST MOD 30 MIN: CPT | Performed by: OTOLARYNGOLOGY

## 2021-07-01 NOTE — PROGRESS NOTES
FOLLOW UP VISIT:    CHIEF COMPLAINT: Thyroid mass. INTERIM HISTORY: Noticed some pain in the left side of her throat and consulted her primary care physician who ordered a thyroid ultrasound. Mass in the left lobe was identified. No dysphagia. No hoarseness. No airway concerns. No history of neck radiation. No family history of thyroid disease. PAST MEDICAL HISTORY:   Social History     Tobacco Use   Smoking Status Former Smoker    Packs/day: 1.00    Years: 15.00    Pack years: 15.00    Quit date: 1991    Years since quittin.5   Smokeless Tobacco Never Used                                                    Social History     Substance and Sexual Activity   Alcohol Use No                                                    Current Outpatient Medications:     levothyroxine (SYNTHROID) 88 MCG tablet, TAKE 1 TABLET BY MOUTH EVERY DAY, Disp: 90 tablet, Rfl: 0    estradiol (ESTRACE) 1 MG tablet, Take 1 mg by mouth daily, Disp: , Rfl:     Cholecalciferol (VITAMIN D3 PO), Take by mouth, Disp: , Rfl:     Ascorbic Acid (VITAMIN C) 500 MG tablet, Take 500 mg by mouth daily. , Disp: , Rfl:                                                  Past Medical History:   Diagnosis Date    Hyperlipidemia     Hypothyroidism     Rash     Thyroid disease     TMJ dysfunction                                                     Past Surgical History:   Procedure Laterality Date    ABDOMEN SURGERY       SECTION      CHOLECYSTECTOMY, LAPAROSCOPIC  1/10/11    COLONOSCOPY  12    HERNIA REPAIR      HYSTERECTOMY      HYSTERECTOMY, TOTAL ABDOMINAL      HYSTERECTOMY, VAGINAL         FAMILY HISTORY: Family history reviewed and except as pertinent to the interim history is not contributory. REVIEW OF SYSTEMS:  All pertinent positive and negative findings included in HPI.   Otherwise, all other systems are reviewed and are negative    PHYSICAL EXAMINATION:   GENERAL: wdwn- no acute distress  RESPIRATORY: No stridor. COMMUNICATION :  Normal voice  MENTAL STATUS: Alert and oriented x3  HEAD AND FACE: No skin lesions of the face. EXTERNAL EARS AND NOSE: The external ears and nasal pyramid are normal.  FACIAL MUSCLES: All branches of the facial nerve intact. FACE PALPATION: Zygomatic arches and orbital rims are intact. No tenderness over the sinuses. EXTRAOCULAR MUSCLES: Intact with full range of motion. OTOSCOPY:  Normal tympanic membranes, middle ear spaces, and external auditory canals. TUNING FORKS:  Rinne ++ Sharma midline at 512 Hz  INTRANASAL:  Septum midline, turbinates normal, meati clear. LIPS, TEETH, GINGIVA:  Normal mucosa  PHARYNX:  Normal  NECK:  No masses. LYMPH NODES: No cervical lymphadenopathy. SALIVARY GLANDS: Parotid and submandibular glands normal.  THYROID: Cannot palpate a mass in the left lobe of the thyroid. THYROID ULTRASOUND REVIEWED: 3.6 cm mass in the left thyroid lobe. IMPRESSION: Mass left thyroid lobe. PLAN: Ultrasound guided fine needle aspirate ordered. FOLLOW-UP: After cytology.

## 2021-07-05 LAB — THYROGLOBULIN BY LC-MS/MS, SERUM/PLASMA: 59.4 NG/ML (ref 1.3–31.8)

## 2021-07-06 NOTE — RESULT ENCOUNTER NOTE
Thyroglobulin elevated. Can discuss at appt, but again would recommend ENT for possible thyroid biopsy given thyroid u/s results.

## 2021-07-14 ENCOUNTER — OFFICE VISIT (OUTPATIENT)
Dept: FAMILY MEDICINE CLINIC | Age: 56
End: 2021-07-14
Payer: COMMERCIAL

## 2021-07-14 ENCOUNTER — TELEPHONE (OUTPATIENT)
Dept: ENT CLINIC | Age: 56
End: 2021-07-14

## 2021-07-14 VITALS
HEIGHT: 64 IN | BODY MASS INDEX: 28.99 KG/M2 | HEART RATE: 84 BPM | WEIGHT: 169.8 LBS | DIASTOLIC BLOOD PRESSURE: 64 MMHG | SYSTOLIC BLOOD PRESSURE: 110 MMHG | OXYGEN SATURATION: 96 %

## 2021-07-14 DIAGNOSIS — E78.5 HYPERLIPIDEMIA, UNSPECIFIED HYPERLIPIDEMIA TYPE: ICD-10-CM

## 2021-07-14 DIAGNOSIS — E03.9 HYPOTHYROIDISM, UNSPECIFIED TYPE: ICD-10-CM

## 2021-07-14 DIAGNOSIS — M79.89 MASS OF SOFT TISSUE OF HIP: ICD-10-CM

## 2021-07-14 DIAGNOSIS — E04.1 THYROID NODULE: ICD-10-CM

## 2021-07-14 DIAGNOSIS — Z00.00 WELL ADULT EXAM: Primary | ICD-10-CM

## 2021-07-14 PROCEDURE — 99396 PREV VISIT EST AGE 40-64: CPT | Performed by: FAMILY MEDICINE

## 2021-07-14 RX ORDER — ASPIRIN 81 MG/1
81 TABLET ORAL DAILY
COMMUNITY
End: 2021-11-03 | Stop reason: ALTCHOICE

## 2021-07-14 ASSESSMENT — ENCOUNTER SYMPTOMS
DIARRHEA: 0
CONSTIPATION: 0

## 2021-07-14 ASSESSMENT — PATIENT HEALTH QUESTIONNAIRE - PHQ9
1. LITTLE INTEREST OR PLEASURE IN DOING THINGS: 0
SUM OF ALL RESPONSES TO PHQ QUESTIONS 1-9: 0
SUM OF ALL RESPONSES TO PHQ9 QUESTIONS 1 & 2: 0
SUM OF ALL RESPONSES TO PHQ QUESTIONS 1-9: 0
SUM OF ALL RESPONSES TO PHQ QUESTIONS 1-9: 0
2. FEELING DOWN, DEPRESSED OR HOPELESS: 0

## 2021-07-14 NOTE — PROGRESS NOTES
Chief Complaint   Patient presents with    Annual Exam     Patient here for annual physical exam.       HPI:  Davide Turk is a 60 y.o. (: 1965) here today   for annual physical exam.  HPI  Overall doing fairly well. Some issues w/ thyroid. Seen by ent. Biopsy recommended. Plans on u/s guided bx. Needs wellness paperwork. Had tried statin in the past.  Stopped due to palpitations. Does seem to get palpitations fairly easily at times. ? MVP. Monitor. Sensitive to caffeine. Remote hx of stress test and sig palpitations after. Has been taking asa 81mg since covid. She has noted what appears to be a soft tissue mass to the right proximal thigh that has been present for some time. This is thought to be a lipoma. She has a similar area that is larger in size on the left does have some discomfort at times. She is more concerned about the area on the left at this time    Patient's medications, allergies, past medical, surgical, social and family histories were reviewed and updated as appropriate. ROS:  Review of Systems   Constitutional: Negative for fever. Cardiovascular: Positive for palpitations. Gastrointestinal: Negative for constipation and diarrhea.            LDL Calculated (mg/dL)   Date Value   2021 153 (H)       Past Medical History:   Diagnosis Date    Hyperlipidemia     Hypothyroidism     Rash     Thyroid disease     TMJ dysfunction        Family History   Problem Relation Age of Onset    Heart Disease Father     High Blood Pressure Mother     High Cholesterol Mother     Kidney Disease Mother         renal polycystic     Arthritis Mother     Early Death Paternal Aunt     Breast Cancer Sister        Social History     Socioeconomic History    Marital status:      Spouse name: Not on file    Number of children: Not on file    Years of education: Not on file    Highest education level: Not on file   Occupational History    Not on file   Tobacco 12.8 oz (77 kg)   SpO2 96%   BMI 29.15 kg/m²     BP Readings from Last 2 Encounters:   07/14/21 110/64   07/01/21 136/83       Wt Readings from Last 3 Encounters:   07/14/21 169 lb 12.8 oz (77 kg)   07/01/21 169 lb 6.4 oz (76.8 kg)   03/15/21 164 lb (74.4 kg)       Physical Exam  Constitutional:       Appearance: She is well-developed. HENT:      Head: Normocephalic and atraumatic. Eyes:      Conjunctiva/sclera: Conjunctivae normal.   Neck:      Trachea: No tracheal deviation. Cardiovascular:      Rate and Rhythm: Normal rate and regular rhythm. Pulmonary:      Effort: Pulmonary effort is normal.      Breath sounds: Normal breath sounds. Abdominal:      Palpations: Abdomen is soft. Tenderness: There is no abdominal tenderness. Musculoskeletal:        Legs:    Skin:     General: Skin is warm and dry. Neurological:      Mental Status: She is alert and oriented to person, place, and time. ASSESSMENT/PLAN:    1. Well adult exam  Overall doing fairly well. See below for further details. Paperwork filled out for wellness for insurance    2. Hypothyroidism, unspecified type  Thyroid function labs okay    3. Hyperlipidemia, unspecified hyperlipidemia type  Cholesterol is slightly better. She is at relatively low cardiovascular risk otherwise. We discussed the pros and cons of statin at this time. We will hold off for now. 4. Mass of soft tissue of hip  Some discomfort and increased size of the area to her left hip. Arrange ultrasound for further evaluation - US SOFT TISSUE LIMITED AREA; Future    5. Thyroid nodule  she has been seen by ENT. Plans on biopsy. Thyroglobulin was mildly elevated. Encourage patient to proceed with biopsy as planned        This document was prepared by a combination of typing and transcription through a voice recognition software.

## 2021-07-15 DIAGNOSIS — E04.1 THYROID NODULE: Primary | ICD-10-CM

## 2021-07-21 ENCOUNTER — HOSPITAL ENCOUNTER (OUTPATIENT)
Dept: ULTRASOUND IMAGING | Age: 56
Discharge: HOME | End: 2021-07-21
Payer: COMMERCIAL

## 2021-07-21 ENCOUNTER — TELEPHONE (OUTPATIENT)
Dept: ENT CLINIC | Age: 56
End: 2021-07-21

## 2021-07-21 DIAGNOSIS — E04.1 THYROID NODULE: ICD-10-CM

## 2021-07-21 DIAGNOSIS — M79.89 MASS OF SOFT TISSUE OF HIP: ICD-10-CM

## 2021-07-21 DIAGNOSIS — M79.89 MASS OF SOFT TISSUE OF HIP: Primary | ICD-10-CM

## 2021-07-21 PROCEDURE — 88305 TISSUE EXAM BY PATHOLOGIST: CPT

## 2021-07-21 PROCEDURE — 76999 ECHO EXAMINATION PROCEDURE: CPT

## 2021-07-21 PROCEDURE — 60100 BIOPSY OF THYROID: CPT

## 2021-07-21 PROCEDURE — 88173 CYTOPATH EVAL FNA REPORT: CPT

## 2021-07-21 NOTE — BRIEF OP NOTE
Brief Postoperative Note    Pili Vo  YOB: 1965  [id number]    Pre-operative Diagnosis: left thyroid nodule    Post-operative Diagnosis: Same    Procedure: US-guided left thyroid nodule FNA    Anesthesia: Local    Surgeons: Haven Landis MD    Estimated Blood Loss: Less than 5 mL    Complications: None    Specimens: Was Obtained: 3 FNA specimens    Findings: Successful US-guided left thyroid nodule FNA.     Electronically signed by Haven Landis MD on 7/21/2021 at 11:54 AM

## 2021-08-02 ENCOUNTER — INITIAL CONSULT (OUTPATIENT)
Dept: SURGERY | Age: 56
End: 2021-08-02
Payer: COMMERCIAL

## 2021-08-02 VITALS
BODY MASS INDEX: 28.74 KG/M2 | DIASTOLIC BLOOD PRESSURE: 88 MMHG | HEIGHT: 64 IN | HEART RATE: 74 BPM | TEMPERATURE: 96.2 F | WEIGHT: 168.37 LBS | SYSTOLIC BLOOD PRESSURE: 134 MMHG

## 2021-08-02 DIAGNOSIS — M79.89 SOFT TISSUE MASS: Primary | ICD-10-CM

## 2021-08-02 PROCEDURE — 99241 PR OFFICE CONSULTATION NEW/ESTAB PATIENT 15 MIN: CPT | Performed by: SURGERY

## 2021-09-01 NOTE — PROGRESS NOTES
[facility]    HPI:  Patient is 60y.o. year old female seen at request of Radha Voss MD.  She presents because of lump located on L thigh. There has been pain at or near the lesion recently after her grandson hit the area accidentally. There is   previous history of similar. There has not been any biopsy. Past Medical History:   Diagnosis Date    Hyperlipidemia     Hypothyroidism     Rash     Thyroid disease     TMJ dysfunction        Past Surgical History:   Procedure Laterality Date    ABDOMEN SURGERY       SECTION      CHOLECYSTECTOMY, LAPAROSCOPIC  1/10/11    COLONOSCOPY  12    HERNIA REPAIR      HYSTERECTOMY      HYSTERECTOMY, TOTAL ABDOMINAL      HYSTERECTOMY, VAGINAL      US THYROID BIOPSY  2021    US THYROID BIOPSY 2021 [facility] ULTRASOUND       Current Outpatient Medications on File Prior to Visit   Medication Sig Dispense Refill    Probiotic Product (PROBIOTIC PO) Take by mouth      aspirin 81 MG EC tablet Take 81 mg by mouth daily      levothyroxine (SYNTHROID) 88 MCG tablet TAKE 1 TABLET BY MOUTH EVERY DAY 90 tablet 0    estradiol (ESTRACE) 1 MG tablet Take 1 mg by mouth daily      Cholecalciferol (VITAMIN D3 PO) Take by mouth      Ascorbic Acid (VITAMIN C) 500 MG tablet Take 500 mg by mouth daily. No current facility-administered medications on file prior to visit.        No Known Allergies    Social History     Socioeconomic History    Marital status:      Spouse name: Not on file    Number of children: Not on file    Years of education: Not on file    Highest education level: Not on file   Occupational History    Not on file   Tobacco Use    Smoking status: Former Smoker     Packs/day: 1.00     Years: 15.00     Pack years: 15.00     Quit date: 1991     Years since quittin.6    Smokeless tobacco: Never Used   Substance and Sexual Activity    Alcohol use: No    Drug use: No    Sexual activity: Not on file Other Topics Concern    Not on file   Social History Narrative    Not on file     Social Determinants of Health     Financial Resource Strain:     Difficulty of Paying Living Expenses:    Food Insecurity:     Worried About Running Out of Food in the Last Year:     [address] in the Last Year:    Transportation Needs:     Lack of Transportation (Medical):  Lack of Transportation (Non-Medical):    Physical Activity:     Days of Exercise per Week:     Minutes of Exercise per Session:    Stress:     Feeling of Stress :    Social Connections:     Frequency of Communication with Friends and Family:     Frequency of Social Gatherings with Friends and Family:     Attends Mandae[employer] Services:     Active Member of Clubs or Organizations:     Attends Club or Organization Meetings:     Marital Status:    Intimate Partner Violence:     Fear of Current or Ex-Partner:     Emotionally Abused:     Physically Abused:     Sexually Abused:        Family History   Problem Relation Age of Onset    Heart Disease Father     High Blood Pressure Mother     High Cholesterol Mother     Kidney Disease Mother         renal polycystic     Arthritis Mother     Early Death Paternal Aunt     Breast Cancer Sister        ROS:  She reports no complaints related to the eyes, ears , nose throat or mouth. She denies weight loss. No chest pain. No SOB. No urinary complaints. No musculoskeletal complaints. Skin complaints include lump L thigh. No neurologic deficits. No bleeding tendencies. No GI complaints. Physical Exam:  Vitals:    08/02/21 1431   BP: 134/88   Pulse: 74   Temp: 96.2 °F (35.7 °C)     General:  Comfortable  Eyes:  No scleral icterus  Ears:  Normal  Nose:  Normal  Mouth:  Mucous membranes moist  Respiratory: Lungs CTA. No accessory muscle use. Heart:  Regular rhythm  Abdomen:  Soft. Non tender. Non distended. Musculoskeletal:  No abnormal movements.  ROM extremities normal.  Skin:  No rashes. Lesion    Location:   L thigh   Pigmented:   No   Diameter:  6 cm   Crusting absent             Neurologic:  No focal deficits. Psychiatric:  AAA. O x 3.            ASSESSMENT:  1. Soft tissue mass            PLAN:  I explained that the area is a lipoma. Plan is to observe at present since the area is not currently causing symptoms.

## 2021-09-14 RX ORDER — LEVOTHYROXINE SODIUM 88 UG/1
TABLET ORAL
Qty: 90 TABLET | Refills: 3 | Status: SHIPPED | OUTPATIENT
Start: 2021-09-14 | End: 2022-10-03

## 2021-11-03 ENCOUNTER — OFFICE VISIT (OUTPATIENT)
Dept: FAMILY MEDICINE CLINIC | Age: 56
End: 2021-11-03
Payer: COMMERCIAL

## 2021-11-03 VITALS
BODY MASS INDEX: 29.01 KG/M2 | SYSTOLIC BLOOD PRESSURE: 136 MMHG | OXYGEN SATURATION: 96 % | DIASTOLIC BLOOD PRESSURE: 82 MMHG | HEART RATE: 88 BPM | WEIGHT: 169 LBS

## 2021-11-03 DIAGNOSIS — S01.20XA OPEN WOUND OF NOSE, UNSPECIFIED OPEN WOUND TYPE, INITIAL ENCOUNTER: Primary | ICD-10-CM

## 2021-11-03 PROCEDURE — 99213 OFFICE O/P EST LOW 20 MIN: CPT

## 2021-11-03 RX ORDER — METHYLPREDNISOLONE 4 MG/1
TABLET ORAL
Qty: 1 KIT | Refills: 0 | Status: SHIPPED | OUTPATIENT
Start: 2021-11-03 | End: 2021-11-09

## 2021-11-03 RX ORDER — CEPHALEXIN 500 MG/1
500 CAPSULE ORAL 2 TIMES DAILY
Qty: 14 CAPSULE | Refills: 0 | Status: SHIPPED | OUTPATIENT
Start: 2021-11-03 | End: 2021-11-10

## 2021-11-03 ASSESSMENT — ENCOUNTER SYMPTOMS
SHORTNESS OF BREATH: 0
GASTROINTESTINAL NEGATIVE: 1
COUGH: 1
RHINORRHEA: 1
SORE THROAT: 0
SINUS PAIN: 0

## 2021-11-03 NOTE — PROGRESS NOTES
Chief Complaint   Patient presents with    Skin Lesion     nostril        HPI:  Karla So is a 60 y.o. (: 1965) here today   for evaluation of wound on left nostril and sore right nostril. Went on a mission trip to [facility] ([place] 10/23/21-10/30/21. First night started with sore throat, then sinus pressure and drainage which was a significant amount of drainage and congestion. She had to constantly wipe he nose from drainage which has made her nose raw and very sore which ultimately led to an open sore on the inner left nostril. Pt states the soreness is spreading to other left nostril which started x2 days ago. Denies ever having a fever. Took one take home covid test which was negative on the trip. Frantz Sands required another covid test to come home which was performed by a physician and result was negative. Was using triple antibiotic ointment on sore on left nostril which is white and she feels is infected. States did not have much success in healing. Patient's medications, allergies, past medical, surgical, social and family histories were reviewed and updated asappropriate. ROS:  Review of Systems   Constitutional: Negative. Negative for fever. HENT: Positive for congestion, postnasal drip and rhinorrhea. Negative for sinus pain and sore throat. Respiratory: Positive for cough. Negative for shortness of breath. Gastrointestinal: Negative. Genitourinary: Negative. Musculoskeletal: Negative. Skin: Positive for wound. Left inner nostril   Neurological: Negative. Psychiatric/Behavioral: Negative. Prior to Visit Medications    Medication Sig Taking? Authorizing Provider   cephALEXin (KEFLEX) 500 MG capsule Take 1 capsule by mouth 2 times daily for 7 days Yes SUSAN Lewis CNP   methylPREDNISolone (MEDROL DOSEPACK) 4 MG tablet Take by mouth.  Yes SUSAN Lewis CNP   levothyroxine (SYNTHROID) 88 MCG tablet TAKE 1 TABLET BY MOUTH EVERY DAY Yes SUSAN Fofana - CNP   Probiotic Product (PROBIOTIC PO) Take by mouth Yes Historical Provider, MD   estradiol (ESTRACE) 1 MG tablet Take 1 mg by mouth daily Yes Historical Provider, MD   Cholecalciferol (VITAMIN D3 PO) Take by mouth Yes Historical Provider, MD   Ascorbic Acid (VITAMIN C) 500 MG tablet Take 500 mg by mouth daily. Yes Historical Provider, MD       No Known Allergies    OBJECTIVE:    /82   Pulse 88   Wt 169 lb (76.7 kg)   SpO2 96%   BMI 29.01 kg/m²     BP Readings from Last 2 Encounters:   11/03/21 136/82   08/02/21 134/88       Wt Readings from Last 3 Encounters:   11/03/21 169 lb (76.7 kg)   08/02/21 168 lb 5.9 oz (76.4 kg)   07/14/21 169 lb 12.8 oz (77 kg)       Physical Exam  Constitutional:       Appearance: Normal appearance. HENT:      Head: Normocephalic and atraumatic. Right Ear: External ear normal.      Left Ear: External ear normal.      Nose: Congestion and rhinorrhea present. Mouth/Throat:      Mouth: Mucous membranes are moist.      Pharynx: Oropharynx is clear. Eyes:      Extraocular Movements: Extraocular movements intact. Pupils: Pupils are equal, round, and reactive to light. Cardiovascular:      Rate and Rhythm: Normal rate and regular rhythm. Pulses: Normal pulses. Heart sounds: Normal heart sounds. No murmur heard. Pulmonary:      Effort: Pulmonary effort is normal. No respiratory distress. Breath sounds: Normal breath sounds. No wheezing. Abdominal:      General: Bowel sounds are normal.      Palpations: Abdomen is soft. Tenderness: There is no abdominal tenderness. Musculoskeletal:         General: Normal range of motion. Cervical back: Normal range of motion and neck supple. Right lower leg: No edema. Left lower leg: No edema. Skin:     General: Skin is warm and dry. Capillary Refill: Capillary refill takes less than 2 seconds. Findings: No rash.       Comments: Open wound left inner nostril that is white in color. Neurological:      General: No focal deficit present. Mental Status: She is alert and oriented to person, place, and time. Motor: No weakness. Psychiatric:         Mood and Affect: Mood normal.         Behavior: Behavior normal.           ASSESSMENT/PLAN:    1. Open wound of nose, unspecified open wound type, initial encounter  Patient had open wound to left inner nostril. I cultured the wound patient was concerned that it could be MRSA. No visible drainage noted at the time. Culture performed and sent off. It does appear that patient has an infection given the white open sore and notable redness around both nostrils. Patient states she has had a history of herpes and questions if this could be herpes flare. I discussed if the antibiotics and steroid pack do not improve the patient's symptoms and that wound culture is negative we could consider a Valtrex prescription if symptoms persist.  - cephALEXin (KEFLEX) 500 MG capsule; Take 1 capsule by mouth 2 times daily for 7 days  Dispense: 14 capsule; Refill: 0  - Culture, Wound  - methylPREDNISolone (MEDROL DOSEPACK) 4 MG tablet; Take by mouth. Dispense: 1 kit; Refill: 0    -I will follow the patient when I get the wound culture results. Start antibiotics and steroids today.    -Follow-up with symptoms fail improve or worsen.     SUSAN Herrera - CNP

## 2021-11-05 ENCOUNTER — TELEPHONE (OUTPATIENT)
Dept: FAMILY MEDICINE CLINIC | Age: 56
End: 2021-11-05

## 2021-11-05 DIAGNOSIS — R21 RASH: ICD-10-CM

## 2021-11-05 RX ORDER — MUPIROCIN CALCIUM 20 MG/G
CREAM TOPICAL
Qty: 15 G | Refills: 0 | Status: SHIPPED | OUTPATIENT
Start: 2021-11-05 | End: 2021-12-05

## 2021-11-05 RX ORDER — SULFAMETHOXAZOLE AND TRIMETHOPRIM 800; 160 MG/1; MG/1
1 TABLET ORAL 2 TIMES DAILY
Qty: 20 TABLET | Refills: 0 | Status: SHIPPED | OUTPATIENT
Start: 2021-11-05 | End: 2021-11-15

## 2021-11-06 LAB
GRAM STAIN RESULT: ABNORMAL
ORGANISM: ABNORMAL
WOUND/ABSCESS: ABNORMAL

## 2021-12-16 ENCOUNTER — TELEPHONE (OUTPATIENT)
Dept: FAMILY MEDICINE CLINIC | Age: 56
End: 2021-12-16

## 2022-04-19 ENCOUNTER — OFFICE VISIT (OUTPATIENT)
Dept: FAMILY MEDICINE CLINIC | Age: 57
End: 2022-04-19
Payer: COMMERCIAL

## 2022-04-19 VITALS
WEIGHT: 174 LBS | SYSTOLIC BLOOD PRESSURE: 118 MMHG | BODY MASS INDEX: 29.87 KG/M2 | HEART RATE: 102 BPM | DIASTOLIC BLOOD PRESSURE: 84 MMHG | OXYGEN SATURATION: 95 % | TEMPERATURE: 99.5 F

## 2022-04-19 DIAGNOSIS — R07.89 CHEST HEAVINESS: ICD-10-CM

## 2022-04-19 DIAGNOSIS — J98.8 RESPIRATORY INFECTION: ICD-10-CM

## 2022-04-19 DIAGNOSIS — R07.89 CHEST TIGHTNESS: ICD-10-CM

## 2022-04-19 DIAGNOSIS — J98.8 RESPIRATORY INFECTION: Primary | ICD-10-CM

## 2022-04-19 LAB
A/G RATIO: 1.5 G/DL (ref 1–2.5)
ALBUMIN SERPL-MCNC: 4.2 G/DL (ref 3.5–5)
ALP BLD-CCNC: 73 U/L (ref 38–126)
ALT SERPL-CCNC: 11 U/L (ref 0–34)
ANION GAP SERPL CALCULATED.3IONS-SCNC: 7.6 MMOL/L (ref 8–16)
AST SERPL-CCNC: 19 U/L (ref 14–36)
BILIRUB SERPL-MCNC: 0.4 MG/DL (ref 0.2–1.3)
BUN BLDV-MCNC: 11 MG/DL (ref 7–17)
CALCIUM SERPL-MCNC: 8.9 MG/DL (ref 8.6–10.3)
CHLORIDE BLD-SCNC: 107 MMOL/L (ref 98–107)
CO2: 28 MMOL/L (ref 22–30)
CREAT SERPL-MCNC: 0.6 MG/DL (ref 0.52–1.04)
CREATINE KINASE ISOENZYMES, SER/PLAS: 36.1 U/L (ref 30–135)
D-DIMER QUANTITATIVE: 0.6 MG/L FEU (ref 0–0.49)
GFR CALCULATED: 102
GLOBULIN: 2.8 G/DL (ref 2–3.5)
GLUCOSE BLD-MCNC: 103 MG/DL (ref 74–100)
POTASSIUM SERPL-SCNC: 3.6 MMOL/L (ref 3.4–5.1)
SODIUM BLD-SCNC: 139 MMOL/L (ref 137–145)
T4 FREE: 1.29 NG/DL (ref 0.78–2.19)
TOTAL PROTEIN: 7 G/DL (ref 6.3–8.2)
TROPONIN I: <0.012 NG/ML (ref 0–0.03)
TSH, 3RD GENERATION: 1.13 MIU/L (ref 0.47–4.68)

## 2022-04-19 PROCEDURE — 93000 ELECTROCARDIOGRAM COMPLETE: CPT

## 2022-04-19 PROCEDURE — 99214 OFFICE O/P EST MOD 30 MIN: CPT

## 2022-04-19 RX ORDER — METHYLPREDNISOLONE 4 MG/1
TABLET ORAL
Qty: 1 KIT | Refills: 0 | Status: CANCELLED | OUTPATIENT
Start: 2022-04-19 | End: 2022-04-25

## 2022-04-19 RX ORDER — AZITHROMYCIN 250 MG/1
250 TABLET, FILM COATED ORAL SEE ADMIN INSTRUCTIONS
Qty: 6 TABLET | Refills: 0 | Status: SHIPPED | OUTPATIENT
Start: 2022-04-19 | End: 2022-04-19 | Stop reason: SDUPTHER

## 2022-04-19 RX ORDER — AZITHROMYCIN 250 MG/1
250 TABLET, FILM COATED ORAL SEE ADMIN INSTRUCTIONS
Qty: 6 TABLET | Refills: 0 | Status: CANCELLED | OUTPATIENT
Start: 2022-04-19 | End: 2022-04-24

## 2022-04-19 RX ORDER — METHYLPREDNISOLONE 4 MG/1
TABLET ORAL
Qty: 1 KIT | Refills: 0 | Status: SHIPPED | OUTPATIENT
Start: 2022-04-19 | End: 2022-04-25

## 2022-04-19 RX ORDER — AZITHROMYCIN 250 MG/1
250 TABLET, FILM COATED ORAL SEE ADMIN INSTRUCTIONS
Qty: 6 TABLET | Refills: 0 | Status: SHIPPED | OUTPATIENT
Start: 2022-04-19 | End: 2022-04-24

## 2022-04-19 RX ORDER — METHYLPREDNISOLONE 4 MG/1
TABLET ORAL
Qty: 1 KIT | Refills: 0 | Status: SHIPPED | OUTPATIENT
Start: 2022-04-19 | End: 2022-04-19 | Stop reason: SDUPTHER

## 2022-04-19 ASSESSMENT — PATIENT HEALTH QUESTIONNAIRE - PHQ9
10. IF YOU CHECKED OFF ANY PROBLEMS, HOW DIFFICULT HAVE THESE PROBLEMS MADE IT FOR YOU TO DO YOUR WORK, TAKE CARE OF THINGS AT HOME, OR GET ALONG WITH OTHER PEOPLE: 0
SUM OF ALL RESPONSES TO PHQ QUESTIONS 1-9: 3
2. FEELING DOWN, DEPRESSED OR HOPELESS: 0
8. MOVING OR SPEAKING SO SLOWLY THAT OTHER PEOPLE COULD HAVE NOTICED. OR THE OPPOSITE, BEING SO FIGETY OR RESTLESS THAT YOU HAVE BEEN MOVING AROUND A LOT MORE THAN USUAL: 0
7. TROUBLE CONCENTRATING ON THINGS, SUCH AS READING THE NEWSPAPER OR WATCHING TELEVISION: 1
SUM OF ALL RESPONSES TO PHQ QUESTIONS 1-9: 3
SUM OF ALL RESPONSES TO PHQ9 QUESTIONS 1 & 2: 0
6. FEELING BAD ABOUT YOURSELF - OR THAT YOU ARE A FAILURE OR HAVE LET YOURSELF OR YOUR FAMILY DOWN: 0
SUM OF ALL RESPONSES TO PHQ QUESTIONS 1-9: 3
9. THOUGHTS THAT YOU WOULD BE BETTER OFF DEAD, OR OF HURTING YOURSELF: 0
5. POOR APPETITE OR OVEREATING: 0
1. LITTLE INTEREST OR PLEASURE IN DOING THINGS: 0
3. TROUBLE FALLING OR STAYING ASLEEP: 1
4. FEELING TIRED OR HAVING LITTLE ENERGY: 1
SUM OF ALL RESPONSES TO PHQ QUESTIONS 1-9: 3

## 2022-04-19 ASSESSMENT — ENCOUNTER SYMPTOMS
EYE DISCHARGE: 0
WHEEZING: 0
TROUBLE SWALLOWING: 0
EYE PAIN: 0
SORE THROAT: 0
CHEST TIGHTNESS: 1
ABDOMINAL DISTENTION: 0
CHOKING: 0
COLOR CHANGE: 0
CONSTIPATION: 0
SHORTNESS OF BREATH: 0
DIARRHEA: 0
COUGH: 1
RHINORRHEA: 1
ABDOMINAL PAIN: 0

## 2022-04-19 NOTE — PROGRESS NOTES
Chief Complaint   Patient presents with    Cough     chest congestion, tightness, fever for 2 days        HPI:  Aissatou Sinclair is a 59 y.o. (: 1965) here today   for x2 days cough, chest congestion/tightness, fever. Fever as high of 100.7. took Ibuprofen for fever. Cough is productive yellow in color. Not sure if she is wheezing or not. Took Nyquil last night but only helped her sleep. About 2 am woke up and felt the chest tightness and heavy feeling on chest. Felt like her right arm hurt. Today is not having the symptoms of right arm pain. Has babak with Cardiology next week for fluttering. Denies SOB, upper back pain, numbness or tingling to neck or jaws. Patient's medications, allergies, past medical, surgical, social and family histories were reviewed and updated asappropriate. ROS:  Review of Systems   Constitutional: Positive for fatigue and fever. Negative for activity change, appetite change, chills and unexpected weight change. HENT: Positive for congestion and rhinorrhea. Negative for sore throat and trouble swallowing. Eyes: Negative for pain, discharge and visual disturbance. Respiratory: Positive for cough and chest tightness. Negative for choking, shortness of breath and wheezing. Cardiovascular: Positive for palpitations. Negative for chest pain and leg swelling. Gastrointestinal: Negative for abdominal distention, abdominal pain, constipation and diarrhea. Endocrine: Negative for cold intolerance and heat intolerance. Genitourinary: Negative for difficulty urinating. Musculoskeletal: Negative for gait problem and neck stiffness. Skin: Negative for color change and rash. Neurological: Negative for dizziness, weakness and headaches. Psychiatric/Behavioral: Negative for dysphoric mood and sleep disturbance. Prior to Visit Medications    Medication Sig Taking?  Authorizing Provider   levothyroxine (SYNTHROID) 88 MCG tablet TAKE 1 TABLET BY MOUTH EVERY DAY Yes Nahum Benton, APRN - CNP   estradiol (ESTRACE) 1 MG tablet Take 1 mg by mouth daily Yes Historical Provider, MD   Cholecalciferol (VITAMIN D3 PO) Take by mouth Yes Historical Provider, MD       No Known Allergies    OBJECTIVE:    /84   Pulse 102   Temp 99.5 °F (37.5 °C)   Wt 174 lb (78.9 kg)   SpO2 95%   Breastfeeding No   BMI 29.87 kg/m²     BP Readings from Last 2 Encounters:   04/19/22 118/84   11/03/21 136/82       Wt Readings from Last 3 Encounters:   04/19/22 174 lb (78.9 kg)   11/03/21 169 lb (76.7 kg)   08/02/21 168 lb 5.9 oz (76.4 kg)       Physical Exam  Constitutional:       Appearance: Normal appearance. She is ill-appearing. HENT:      Head: Normocephalic and atraumatic. Right Ear: External ear normal.      Left Ear: External ear normal.      Nose: Congestion present. Mouth/Throat:      Mouth: Mucous membranes are moist.      Pharynx: Oropharynx is clear. Eyes:      Extraocular Movements: Extraocular movements intact. Pupils: Pupils are equal, round, and reactive to light. Cardiovascular:      Rate and Rhythm: Normal rate and regular rhythm. Pulses: Normal pulses. Heart sounds: Normal heart sounds. No murmur heard. Pulmonary:      Effort: Pulmonary effort is normal. No respiratory distress. Breath sounds: Normal breath sounds. No wheezing. Abdominal:      General: Bowel sounds are normal.      Palpations: Abdomen is soft. Tenderness: There is no abdominal tenderness. Musculoskeletal:         General: Normal range of motion. Cervical back: Normal range of motion and neck supple. Right lower leg: No edema. Left lower leg: No edema. Skin:     General: Skin is warm and dry. Capillary Refill: Capillary refill takes less than 2 seconds. Findings: No rash. Neurological:      General: No focal deficit present. Mental Status: She is alert and oriented to person, place, and time. Motor: No weakness. Psychiatric:         Mood and Affect: Mood normal.         Behavior: Behavior normal.           ASSESSMENT/PLAN:    1. Respiratory infection  See above HPI for symptoms and onset. Not treating at this time with antibiotics or medrol niranjan until reviewing labs ordered below. Do suspect viral at this time. Would benefit from steroid given symptoms. Want to ensure cardiac involvement is neg before ordering additional meds for resp illness due to increase chance of increased HR. 2. Chest tightness  Patient is following with cardiology next week on Wednesday for symptoms of palpitations. We will order cardiac labs as well as thyroid labs to assess for abnormalities that can be contributing to the patient's symptoms. Orders were ordered stat and the patient was sent to [facility] for testing.  - Comprehensive Metabolic Panel; Future  - Troponin; Future  - CK; Future  - D-Dimer, Quantitative; Future  - TSH; Future  - T4, Free; Future    3. Chest heaviness  EKG in office today showed some ST depression compared to previous EKG in march 2021. Cardiac labs and thyroid labs ordered to assess for abnormalities to contribute to pt symptoms.   - EKG 12 lead  - Comprehensive Metabolic Panel; Future  - Troponin; Future  - CK; Future  - D-Dimer, Quantitative; Future  - TSH; Future  - T4, Free; Future    Will await labs ordered to be resulted and determine next step in patient care. Considering ordering a Medrol Dosepak for the patient's chest tightness as well as Z-Niranjan to have on hand the patient symptoms do not improve    35 minutes spent on patient care today    This document was prepared by a combination of typing and transcription through a voice recognition software.     Evi Sanz, APRN - CNP

## 2022-04-25 NOTE — PROGRESS NOTES
[address] [facility]   Cardiovascular Evaluation    PATIENT: Lukasz Dockery  DATE: 2022  MRN: [id number]  CSN: [id number]  : 1965      Primary Care Doctor: Yaneth Lynn MD  Reason for evaluation:   Chest pain, palpitations    Subjective:   History of present illness on initial date of evaluation:  Lukasz Dockery is a 59 y.o. female with a history of hyperlipidemia and hypothyroidism referred for further evaluation of palpitations and chest pain. The patient reports that she has had several episodes of palpitations where she feels like her heart is beating fast.  She denies any associated lightheadedness, dizziness, or syncope. She says that when she took aspirin in the past, she thought that this made her palpitations worse. She also noticed an increase in palpitations when drinking caffeine and she has thus tried to cut back on her caffeine intake. She has also been having episodes of chest tightness which she describes as feeling like her bra strap is too tight. These episodes seem to occur at rest without any specific trigger and can last for 2-3 days. When this happens, she says that she has difficulty going to sleep. She denies any associated shortness of breath, edema, or orthopnea. Approximately 10 days ago, she says she had an upper respiratory infection with fever and a cough. She says that she had some chest tightness at that time and once woke up in the middle of the night with chest tightness/heaviness and associated right arm pain. She states that she still has a cough at this time. She is a former smoker and smoked 1 pack of cigarettes per day for 12 years. She quit smoking at age [age] [facility]. She denies any use of alcohol or illicit drugs. Family history is notable for HOCM and CAD in her father who had coronary stents and  at age 80. She says that he had a heart attack 3 weeks before he . Her mother has heart failure and mitral regurgitation.     She was evaluated her PCP for chest pain on 22. An EKG obtained at that time showed normal sinus rhythm with left axis deviation and no ischemic changes. Troponin I was WNL. D-dimer was very minimally elevated. Patient Active Problem List   Diagnosis    Hypothyroidism    Foot pain    Pain in joint, hand    Open wound of finger with complication    Foot sprain    Hyperlipidemia    Lymphocytic thyroiditis    Tonsillar abscess       Past Medical History:   has a past medical history of Hyperlipidemia, Hypothyroidism, Rash, Thyroid disease, and TMJ dysfunction. Surgical History:   has a past surgical history that includes hernia repair;  section; Hysterectomy; Abdomen surgery; Cholecystectomy, laparoscopic (1/10/11); Colonoscopy (12); Hysterectomy, total abdominal; Hysterectomy, vaginal; and US BIOPSY THYROID (2021). Social History:   reports that she quit smoking about 31 years ago. She has a 15.00 pack-year smoking history. She has never used smokeless tobacco. She reports that she does not drink alcohol and does not use drugs. Family History:  Family History   Problem Relation Age of Onset    Heart Disease Father      High Blood Pressure Mother      High Cholesterol Mother      Kidney Disease Mother           renal polycystic     Arthritis Mother      Early Death Paternal [de-identified]      Breast Cancer Sister        Mother - HFpEF, mitral regurgitation     Home Medications:  Reviewed and are listed in nursing record. and/or listed below  Current Outpatient Medications   Medication Sig Dispense Refill    methylPREDNISolone (MEDROL DOSEPACK) 4 MG tablet Take by mouth. 1 kit 0    levothyroxine (SYNTHROID) 88 MCG tablet TAKE 1 TABLET BY MOUTH EVERY DAY 90 tablet 3    estradiol (ESTRACE) 1 MG tablet Take 1 mg by mouth daily      Cholecalciferol (VITAMIN D3 PO) Take by mouth       No current facility-administered medications for this visit.         Allergies:  Patient has no known allergies. Review of Systems:   Review of Systems   Constitutional: Positive for fever. Negative for chills. HENT: Negative for rhinorrhea and sore throat. Eyes: Negative for photophobia, pain and visual disturbance. Respiratory: Positive for cough. Negative for shortness of breath. Cardiovascular: Positive for chest pain and palpitations. Negative for leg swelling. Gastrointestinal: Negative for abdominal pain, blood in stool, constipation, diarrhea, nausea and vomiting. Endocrine: Negative for cold intolerance and heat intolerance. Genitourinary: Negative for difficulty urinating, dysuria and hematuria. Musculoskeletal: Negative for arthralgias, joint swelling and myalgias. Skin: Negative for rash and wound. Allergic/Immunologic: Negative for environmental allergies and food allergies. Neurological: Negative for dizziness, syncope and light-headedness. Hematological: Negative for adenopathy. Does not bruise/bleed easily. Psychiatric/Behavioral: Negative for dysphoric mood. The patient is not nervous/anxious. Objective:   PHYSICAL EXAM:    Vitals:    04/27/22 1427   BP: 126/78   Pulse: 71   SpO2: 97%     Wt Readings from Last 3 Encounters:   04/19/22 174 lb (78.9 kg)   11/03/21 169 lb (76.7 kg)   08/02/21 168 lb 5.9 oz (76.4 kg)     General: Adult female in no acute distress. Pleasant and interactive on exam.  HEENT: Normocephalic, atraumatic, non-icteric, hearing intact, nares normal, mucous membranes moist.  Neck: Supple, trachea midline. No adenopathy. No thyromegaly. No carotid bruits. No JVD. Heart: Regular rate and rhythm. Normal S1 and S2. No murmurs, gallops or rubs. Lungs: Normal respiratory effort. Clear to auscultation bilaterally. No wheezes, rales, or rhonchi. Abdomen: Soft, non-tender. Normoactive bowel sounds. No masses or organomegaly. Skin: No rashes, wounds, or lesions. Pulses: 2+ and symmetric.   Extremities: No clubbing, cyanosis, or pain, she does have risk factors for CAD and it is reasonable to proceed with nuclear SPECT stress testing for further cardiac risk stratification. 2. Palpitations - Will arrange for 2-week cardiac event monitor to evaluate for tachyarrhythmias. TSH and electrolytes were WNL on recent lab studies. 3. Hyperlipidemia - Last lipid panel from 6/29/21 showed total cholesterol 244, HDL 43, , and triglycerides 238.  4. Hypothyroidism - TSH and free T4 WNL on 4/19/22. 5. Recent URI  6. Family history of CAD      Plan:     1. Will obtain GXT nuclear SPECT stress test for further cardiac risk stratification. OK to convert to [place] if does not reach target heart rate with exercise. 2. Order transthoracic echocardiogram for complete assessment of cardiac structure and function. 3. Arrange for 2-week cardiac event monitor to evaluate for potential tachyarrhythmias. 4. Will obtain repeat fasting lipid panel and pending results of lab work and nuclear stress test, may benefit from initiation of statin therapy. 5. Encouraged patient to make sure that she is maintaining adequate PO fluid intake and to continue to limit her consumption of caffeine. 6. Follow-up with me in one month. Scribe's attestation: This note was scribed in the presence of Alvaro Matson DO by Kalee Sanchez RN       It is a pleasure to assist in the care of Kacie WynneErica Annigena Adams. Please call with any questions. The scribes documentation has been prepared under my direction and personally reviewed by me in its entirety. I confirm that the note above accurately reflects all work, treatment, procedures, and medical decision making performed by me. I, Dr. Jennifer Leggett, personally performed the services described in this documentation as scribed by Kalee Sanchez, ELA in my presence, and it is both accurate and complete to the best of our ability.        Jennifer Leggett, [address] [facility]  [phone] [facility]  [phone] [facility] Office

## 2022-04-27 ENCOUNTER — TELEPHONE (OUTPATIENT)
Dept: CARDIOLOGY CLINIC | Age: 57
End: 2022-04-27

## 2022-04-27 ENCOUNTER — OFFICE VISIT (OUTPATIENT)
Dept: CARDIOLOGY CLINIC | Age: 57
End: 2022-04-27
Payer: COMMERCIAL

## 2022-04-27 VITALS
HEART RATE: 71 BPM | OXYGEN SATURATION: 97 % | SYSTOLIC BLOOD PRESSURE: 126 MMHG | HEIGHT: 64 IN | WEIGHT: 176 LBS | DIASTOLIC BLOOD PRESSURE: 78 MMHG | BODY MASS INDEX: 30.05 KG/M2

## 2022-04-27 DIAGNOSIS — E78.5 HYPERLIPIDEMIA, UNSPECIFIED HYPERLIPIDEMIA TYPE: ICD-10-CM

## 2022-04-27 DIAGNOSIS — Z82.49 FAMILY HISTORY OF CORONARY ARTERY DISEASE: ICD-10-CM

## 2022-04-27 DIAGNOSIS — R00.2 PALPITATIONS: ICD-10-CM

## 2022-04-27 DIAGNOSIS — E03.9 HYPOTHYROIDISM, UNSPECIFIED TYPE: ICD-10-CM

## 2022-04-27 DIAGNOSIS — R07.89 ATYPICAL CHEST PAIN: Primary | ICD-10-CM

## 2022-04-27 PROCEDURE — 99204 OFFICE O/P NEW MOD 45 MIN: CPT | Performed by: INTERNAL MEDICINE

## 2022-04-27 PROCEDURE — 93246 EXT ECG>7D<15D RECORDING: CPT | Performed by: INTERNAL MEDICINE

## 2022-04-27 NOTE — PATIENT INSTRUCTIONS
Patient Plan:  1. Recommend Echo to assess heart strength and function   -you will call to schedule this  2. Recommend Stress Test to evaluate for any potential heart blockages   -you will call to schedule this   3. Cardiac Event Monitor for 2 weeks  4. Labs: fasting lipids   -may need to start medication pending results  5. Follow up with me in 1 month    Your provider has ordered testing for further evaluation. An order/prescription has been included in your paper work.  To schedule outpatient testing, contact Central Scheduling by calling [address] [place] [address] ([phone]).

## 2022-04-27 NOTE — TELEPHONE ENCOUNTER
[facility] 14 day monitor ordered for pt  Orderd by: [facility]  Date: 4/27/22  Place: [facility] List office  Placed by: Yoshi Amezcua LPN      Pt given verbal instructions on how monitor works, how to report cardiac symptoms on monitor and where to return monitor (ship back to [place]) once prescribed wear time has been completed. Pt verbalized understanding of all instructions given.         Monitor ID: [id number]

## 2022-05-13 PROCEDURE — 93248 EXT ECG>7D<15D REV&INTERPJ: CPT | Performed by: INTERNAL MEDICINE

## 2022-05-17 ENCOUNTER — TELEPHONE (OUTPATIENT)
Dept: FAMILY MEDICINE CLINIC | Age: 57
End: 2022-05-17

## 2022-05-17 DIAGNOSIS — E78.5 HYPERLIPIDEMIA, UNSPECIFIED HYPERLIPIDEMIA TYPE: Primary | ICD-10-CM

## 2022-05-17 NOTE — TELEPHONE ENCOUNTER
----- Message from Mau Davis sent at 5/17/2022 10:02 AM EDT -----  Subject: Referral Request    QUESTIONS   Reason for referral request? Grace Moon   Has the physician seen you for this condition before? No   Preferred Specialist (if applicable)? Do you already have an appointment scheduled? No  Additional Information for Provider? Patient would like an order for   bloodwork put in and would like as call once it is so she knows she can   come get it done.  ---------------------------------------------------------------------------  --------------  CALL BACK INFO  What is the best way for the office to contact you? OK to leave message on   voicemail  Preferred Call Back Phone Number? [phone]  ---------------------------------------------------------------------------  --------------  SCRIPT ANSWERS  Relationship to Patient?  Self

## 2022-05-19 DIAGNOSIS — R00.2 PALPITATION: ICD-10-CM

## 2022-05-23 ASSESSMENT — ENCOUNTER SYMPTOMS
NAUSEA: 0
PHOTOPHOBIA: 0
SHORTNESS OF BREATH: 0
SORE THROAT: 0
EYE PAIN: 0
BLOOD IN STOOL: 0
CONSTIPATION: 0
COUGH: 1
RHINORRHEA: 0
ABDOMINAL PAIN: 0
DIARRHEA: 0
VOMITING: 0

## 2022-05-24 ENCOUNTER — NURSE ONLY (OUTPATIENT)
Dept: FAMILY MEDICINE CLINIC | Age: 57
End: 2022-05-24
Payer: COMMERCIAL

## 2022-05-24 ENCOUNTER — TELEPHONE (OUTPATIENT)
Dept: CARDIOLOGY CLINIC | Age: 57
End: 2022-05-24

## 2022-05-24 DIAGNOSIS — E78.5 HYPERLIPIDEMIA, UNSPECIFIED HYPERLIPIDEMIA TYPE: ICD-10-CM

## 2022-05-24 PROCEDURE — 36415 COLL VENOUS BLD VENIPUNCTURE: CPT | Performed by: FAMILY MEDICINE

## 2022-05-24 NOTE — PROGRESS NOTES
Blood drawn per order. Needle size: 23 g  Site: L Antecubital.  First attempt successful Yes    Second attempt no    Pressure applied until bleeding stopped. pressure applied. Patient informed to call office or return if bleeding reoccurs and unable to stop.     Tubes drawn: 0 purple     1 red

## 2022-05-24 NOTE — TELEPHONE ENCOUNTER
----- Message from Olivia Marte DO sent at 5/23/2022  2:25 PM EDT -----  Please let patient know that her recent cardiac event monitor showed pre-dominant sinus rhythm with occasional PACs, rare PVCs, and very brief PSVT. Overall, no concerning arrhythmias were observed. She has follow-up scheduled with me on 6/1/22 and we can discuss her results in more detail at that time. Thanks.

## 2022-05-25 ENCOUNTER — OFFICE VISIT (OUTPATIENT)
Dept: FAMILY MEDICINE CLINIC | Age: 57
End: 2022-05-25
Payer: COMMERCIAL

## 2022-05-25 VITALS
DIASTOLIC BLOOD PRESSURE: 74 MMHG | HEIGHT: 64 IN | OXYGEN SATURATION: 98 % | SYSTOLIC BLOOD PRESSURE: 110 MMHG | WEIGHT: 176.8 LBS | HEART RATE: 86 BPM | BODY MASS INDEX: 30.19 KG/M2

## 2022-05-25 DIAGNOSIS — E04.1 THYROID NODULE: ICD-10-CM

## 2022-05-25 DIAGNOSIS — R00.2 PALPITATIONS: ICD-10-CM

## 2022-05-25 DIAGNOSIS — Z12.31 ENCOUNTER FOR SCREENING MAMMOGRAM FOR BREAST CANCER: ICD-10-CM

## 2022-05-25 DIAGNOSIS — Z00.00 WELL ADULT EXAM: Primary | ICD-10-CM

## 2022-05-25 DIAGNOSIS — E03.9 HYPOTHYROIDISM, UNSPECIFIED TYPE: ICD-10-CM

## 2022-05-25 LAB
A/G RATIO: 1.7 (ref 1.1–2.2)
ALBUMIN SERPL-MCNC: 4.3 G/DL (ref 3.4–5)
ALP BLD-CCNC: 69 U/L (ref 40–129)
ALT SERPL-CCNC: 7 U/L (ref 10–40)
ANION GAP SERPL CALCULATED.3IONS-SCNC: 20 MMOL/L (ref 3–16)
AST SERPL-CCNC: 10 U/L (ref 15–37)
BILIRUB SERPL-MCNC: 0.3 MG/DL (ref 0–1)
BUN BLDV-MCNC: 12 MG/DL (ref 7–20)
CALCIUM SERPL-MCNC: 9.1 MG/DL (ref 8.3–10.6)
CHLORIDE BLD-SCNC: 106 MMOL/L (ref 99–110)
CHOLESTEROL, TOTAL: 270 MG/DL (ref 0–199)
CO2: 20 MMOL/L (ref 21–32)
CREAT SERPL-MCNC: 0.6 MG/DL (ref 0.6–1.1)
GFR AFRICAN AMERICAN: >60
GFR NON-AFRICAN AMERICAN: >60
GLUCOSE BLD-MCNC: 95 MG/DL (ref 70–99)
HDLC SERPL-MCNC: 44 MG/DL (ref 40–60)
LDL CHOLESTEROL CALCULATED: 170 MG/DL
POTASSIUM SERPL-SCNC: 4.1 MMOL/L (ref 3.5–5.1)
SODIUM BLD-SCNC: 146 MMOL/L (ref 136–145)
TOTAL PROTEIN: 6.8 G/DL (ref 6.4–8.2)
TRIGL SERPL-MCNC: 280 MG/DL (ref 0–150)
VLDLC SERPL CALC-MCNC: 56 MG/DL

## 2022-05-25 PROCEDURE — 99396 PREV VISIT EST AGE 40-64: CPT | Performed by: FAMILY MEDICINE

## 2022-05-25 ASSESSMENT — ENCOUNTER SYMPTOMS: SHORTNESS OF BREATH: 0

## 2022-05-25 NOTE — PROGRESS NOTES
Chief Complaint   Patient presents with    Annual Exam       HPI:  Lisa Sung is a 59 y.o. (: 1965) here today   for annual exam.  HPI  Was placed on heart monitor per cardio. Plans on echo and stress test next week. Cardio appt after testing. Had been having some palpitations as well. Felt poorly at times. Had prior viral illness. Chest heaviness at night during that episode. Had sensation in fullness of chest at times during that time. Plans on echo and stress test.      Chol elevated on recent labs. Sig family hx of elevated cholesterol. fam hx of heart dz as well. CAD as well. Did not gadiel crestor in the past.  Seemed to [employer] flutter. \"  Had similar issues w/ taking asa as well. Did fairly well w/ keto in the past.  Has gained some weight overall. Does not eat much carbs. Has minimized sugar. Drinks a lot of water. Has had less overall exercise. Patient's medications, allergies, past medical, surgical, social and family histories were reviewed and updated as appropriate. ROS:  Review of Systems   Constitutional: Negative for fever. Respiratory: Negative for shortness of breath. Cardiovascular: Positive for palpitations.            LDL Calculated (mg/dL)   Date Value   2022 170 (H)       Past Medical History:   Diagnosis Date    Hyperlipidemia     Hypothyroidism     Rash     Thyroid disease     TMJ dysfunction        Family History   Problem Relation Age of Onset    Heart Disease Father     High Blood Pressure Mother     High Cholesterol Mother     Kidney Disease Mother         renal polycystic     Arthritis Mother     Early Death Paternal Aunt     Breast Cancer Sister        Social History     Socioeconomic History    Marital status:      Spouse name: Not on file    Number of children: Not on file    Years of education: Not on file    Highest education level: Not on file   Occupational History    Not on file   Tobacco Use    Smoking status: Former Smoker     Packs/day: 1.00     Years: 15.00     Pack years: 15.00     Quit date: 1991     Years since quittin.4    Smokeless tobacco: Never Used   Substance and Sexual Activity    Alcohol use: No    Drug use: No    Sexual activity: Not on file   Other Topics Concern    Not on file   Social History Narrative    Not on file     Social Determinants of Health     Financial Resource Strain:     Difficulty of Paying Living Expenses: Not on file   Food Insecurity:     Worried About Running Out of Food in the Last Year: Not on file    Carlito of Food in the Last Year: Not on file   Transportation Needs:     Lack of Transportation (Medical): Not on file    Lack of Transportation (Non-Medical): Not on file   Physical Activity:     Days of Exercise per Week: Not on file    Minutes of Exercise per Session: Not on file   Stress:     Feeling of Stress : Not on file   Social Connections:     Frequency of Communication with Friends and Family: Not on file    Frequency of Social Gatherings with Friends and Family: Not on file    Attends Anabaptist Services: Not on file    Active Member of [address] [place] [address] or Organizations: Not on file    Attends Club or Organization Meetings: Not on file    Marital Status: Not on file   Intimate Partner Violence:     Fear of Current or Ex-Partner: Not on file    Emotionally Abused: Not on file    Physically Abused: Not on file    Sexually Abused: Not on file   Housing Stability:     Unable to Pay for Housing in the Last Year: Not on file    Number of [place] in the Last Year: Not on file    Unstable Housing in the Last Year: Not on file       Prior to Visit Medications    Medication Sig Taking?  Authorizing Provider   levothyroxine (SYNTHROID) 88 MCG tablet TAKE 1 TABLET BY MOUTH EVERY DAY Yes SUSAN Quick CNP   estradiol (ESTRACE) 1 MG tablet Take 1 mg by mouth daily Yes Historical Provider, MD   Cholecalciferol (VITAMIN D3 PO) Take by mouth Yes Historical Provider, MD       No Known Allergies    OBJECTIVE:    /74   Pulse 86   Ht 5' 4\" (1.626 m)   Wt 176 lb 12.8 oz (80.2 kg)   SpO2 98%   BMI 30.35 kg/m²     BP Readings from Last 2 Encounters:   05/25/22 110/74   04/27/22 126/78       Wt Readings from Last 3 Encounters:   05/25/22 176 lb 12.8 oz (80.2 kg)   04/27/22 176 lb (79.8 kg)   04/19/22 174 lb (78.9 kg)       Physical Exam  Constitutional:       Appearance: She is well-developed. HENT:      Head: Normocephalic and atraumatic. Eyes:      Conjunctiva/sclera: Conjunctivae normal.   Neck:      Trachea: No tracheal deviation. Cardiovascular:      Rate and Rhythm: Normal rate and regular rhythm. Pulmonary:      Effort: Pulmonary effort is normal.      Breath sounds: Normal breath sounds. Abdominal:      Palpations: Abdomen is soft. Tenderness: There is no abdominal tenderness. Skin:     General: Skin is warm and dry. Neurological:      Mental Status: She is alert and oriented to person, place, and time. ASSESSMENT/PLAN:    1. Well adult exam  Overall doing fairly well. Concerned w/ some diff losing weight/ mild weight gain. Cont to monitor diet. Is drinking plenty of water. Discussed inc activity level. Has not really gained much weight     2. Thyroid nodule  Prior biopsy neg. Last u/s approx 11 mo ago. Order for rpt. Rpt thyroglobulin. Mild elevation last check. - US THYROID; Future  - THYROGLOBULIN; Future    3. Encounter for screening mammogram for breast cancer    - ELIANE DIGITAL SCREEN W OR WO CAD BILATERAL; Future    4. Hypothyroidism, unspecified type  The current medical regimen is effective;  continue present plan and medications. 5. Palpitations  Has f/u w/ cardio, stress test and echo pending.  Reviewed monitor

## 2022-05-27 ASSESSMENT — ENCOUNTER SYMPTOMS
DIARRHEA: 0
RHINORRHEA: 0
ABDOMINAL PAIN: 0
SHORTNESS OF BREATH: 0
PHOTOPHOBIA: 0
NAUSEA: 0
BLOOD IN STOOL: 0
SORE THROAT: 0
EYE PAIN: 0
CONSTIPATION: 0
VOMITING: 0

## 2022-05-27 NOTE — PROGRESS NOTES
[facility]   Cardiovascular Evaluation    PATIENT: Eddie Alejandra  DATE: 2022  MRN: [id number]  CSN: [id number]  : 1965      Primary Care Doctor: Aria Smith MD  Reason for evaluation:   Follow-up for chest pain and palpitations. Subjective:   History of present illness on initial date of evaluation:  Eddie Alejandra is a 59 y.o. female with a history of hyperlipidemia and hypothyroidism who presents for follow for chest pain and palpitations. Following patient's initial visit on 22, she had a pharmacologic nuclear SPECT stress test that demonstrated normal myocardial perfusion. Her TTE showed an LVEF of 55-60% with normal wall motion, mild septal hypertrophy, normal LV diastolic filling pressure, normal RV size and systolic function, mild-moderately enlarged left atrium, moderate mitral regurgitation, mild-moderate tricuspid regurgitation, estimated SPAP of 30 mmHg, and a trivial pericardial effusion. She also wore a 2-week cardiac event monitor for 2 weeks which showed pre-dominant sinus rhythm with rare PACs, rare PVCs, and brief PSVT. Since her last visit, she reports that she has been feeling much better and that her chest pain has been resolved. Her palpitations have become much less frequent as well. She denies any shortness of breath, lightheadedness, or dizziness. She notes occasional mild lower extremity edema, but does not currently have any significant edema at this time. She has been trying to get out and walk regularly for exercise. She has been under a lot of stress lately dealing with other family member's health issues. Her  also recently lost his job as well.       Patient Active Problem List   Diagnosis    Hypothyroidism    Foot pain    Pain in joint, hand    Open wound of finger with complication    Foot sprain    Hyperlipidemia    Lymphocytic thyroiditis    Tonsillar abscess       Past Medical History:   has a past medical history of Hyperlipidemia, Hypothyroidism, Rash, Thyroid disease, and TMJ dysfunction. Surgical History:   has a past surgical history that includes hernia repair;  section; Hysterectomy; Abdomen surgery; Cholecystectomy, laparoscopic (1/10/11); Colonoscopy (12); Hysterectomy, total abdominal; Hysterectomy, vaginal; and US BIOPSY THYROID (2021). Social History:   reports that she quit smoking about 31 years ago. She has a 15.00 pack-year smoking history. She has never used smokeless tobacco. She reports that she does not drink alcohol and does not use drugs. Family History:  Family History   Problem Relation Age of Onset    Heart Disease Father     High Blood Pressure Mother     High Cholesterol Mother     Kidney Disease Mother         renal polycystic     Arthritis Mother     Early Death Paternal [de-identified]     Breast Cancer Sister    Mother - HFpEF, mitral regurgitation    Home Medications:  Reviewed and are listed in nursing record. and/or listed below  Current Outpatient Medications   Medication Sig Dispense Refill    levothyroxine (SYNTHROID) 88 MCG tablet TAKE 1 TABLET BY MOUTH EVERY DAY 90 tablet 3    estradiol (ESTRACE) 1 MG tablet Take 1 mg by mouth daily      Cholecalciferol (VITAMIN D3 PO) Take by mouth       No current facility-administered medications for this visit. Allergies:  Patient has no known allergies. Review of Systems:   Review of Systems   Constitutional: Negative for chills and fever. HENT: Negative for rhinorrhea and sore throat. Eyes: Negative for photophobia, pain and visual disturbance. Respiratory: Negative for cough and shortness of breath. Cardiovascular: Positive for palpitations and leg swelling. Negative for chest pain. Gastrointestinal: Negative for abdominal pain, blood in stool, constipation, diarrhea, nausea and vomiting. Endocrine: Negative for cold intolerance and heat intolerance.    Genitourinary: Negative for difficulty urinating, dysuria and hematuria. Musculoskeletal: Negative for arthralgias, joint swelling and myalgias. Skin: Negative for rash and wound. Allergic/Immunologic: Negative for environmental allergies and food allergies. Neurological: Negative for dizziness, syncope and light-headedness. Hematological: Negative for adenopathy. Does not bruise/bleed easily. Psychiatric/Behavioral: Negative for dysphoric mood. The patient is not nervous/anxious. Objective:   PHYSICAL EXAM:    Vitals:    06/01/22 1427   BP: 130/78   Pulse: 88   SpO2: 96%     Wt Readings from Last 3 Encounters:   06/01/22 177 lb (80.3 kg)   05/25/22 176 lb 12.8 oz (80.2 kg)   04/27/22 176 lb (79.8 kg)     General: Adult female in no acute distress. Pleasant and interactive on exam.  HEENT: Normocephalic, atraumatic, non-icteric, hearing intact, nares normal, mucous membranes moist.  Neck: No JVD. Heart: Regular rate and rhythm. Normal S1 and S2. Grade II/VI holosystolic murmur radiating to apex. No rubs or gallops. Lungs: Normal respiratory effort. Clear to auscultation bilaterally. No wheezes, rales, or rhonchi. Abdomen: Soft, non-tender. Normoactive bowel sounds. No masses or organomegaly. Skin: No rashes, wounds, or lesions. Pulses: 2+ and symmetric. Extremities: No clubbing, cyanosis, or edema. Psych: Normal mood and affect. Neuro: Alert and oriented to person, place, and time. No focal deficits noted.       LABS   CBC:      Lab Results   Component Value Date    WBC 4.6 06/29/2021    RBC 4.40 06/29/2021    HGB 14.0 06/29/2021    HCT 40.9 06/29/2021    MCV 92.8 06/29/2021    RDW 12.2 06/29/2021     06/29/2021     CMP:  Lab Results   Component Value Date     05/24/2022    K 4.1 05/24/2022    K 3.7 03/15/2021     05/24/2022    CO2 20 05/24/2022    BUN 12 05/24/2022    CREATININE 0.6 05/24/2022    GFRAA >60 05/24/2022    GFRAA >60 01/10/2011    AGRATIO 1.7 05/24/2022    LABGLOM >60 05/24/2022 GLUCOSE 95 05/24/2022    PROT 6.8 05/24/2022    PROT 6.8 01/10/2011    CALCIUM 9.1 05/24/2022    BILITOT 0.3 05/24/2022    ALKPHOS 69 05/24/2022    AST 10 05/24/2022    ALT 7 05/24/2022     PT/INR:   No results found for: PTINR  Liver:  No components found for: CHLPL  Lab Results   Component Value Date    ALT 7 (L) 05/24/2022    AST 10 (L) 05/24/2022    ALKPHOS 69 05/24/2022    BILITOT 0.3 05/24/2022     No results found for: LABA1C  Lipids:         Lab Results   Component Value Date    TRIG 280 (H) 05/24/2022    TRIG 238 (H) 06/29/2021    TRIG 139 07/01/2020            Lab Results   Component Value Date    HDL 44 05/24/2022    HDL 43 06/29/2021    HDL 46 07/01/2020            Lab Results   Component Value Date    LDLCALC 170 (H) 05/24/2022    LDLCALC 153 (H) 06/29/2021    LDLCALC 175 (H) 07/01/2020            Lab Results   Component Value Date    LABVLDL 56 05/24/2022    LABVLDL 48 06/29/2021    LABVLDL 28 07/01/2020         CARDIAC DATA   LAST EKG 4/19/22:  Normal sinus rhythm, left axis deviation, no ischemic changes. ECHO:   TTE 6/1/22:  Summary   Left ventricular systolic function is normal with ejection fraction   estimated at 55-60 %. No regional wall motion abnormalities are noted. Mild septal hypertrophy is present with normal thickness of remaining left   ventricular wall segments. Normal left ventricular diastolic filling pressure. Mild posterior mitral annular calcification is present. Moderate mitral regurgitation. Mild-to-moderate tricuspid regurgitation. Normal systolic pulmonary artery pressure (SPAP) estimated at 30 mmHg (RA   pressure 3 mmHg). STRESS TEST:   Pharmacologic Nuclear SPECT Stress 6/1/22:  Summary    Normal LV function. There is normal isotope uptake at stress and rest.   There is no evidence of  myocardial ischemia or scar. Normal myocardial perfusion study. CARDIAC CATH: N/A    OTHER DATA:  Cardiac Event Monitor 4/27 - 5/11/22:        Assessment:     1. Atypical chest pain - Resolved. Suspect may have been secondary to muscular chest wall pain. Nuclear SPECT stress test from 6/1/22 demonstrated normal myocardial perfusion. Biventricular systolic function was preserved on TTE. 2. Palpitations - Have become much less frequent. Recent 2-week cardiac event monitor showed pre-dominant sinus rhythm with rare PACs, rare PVCs, and very brief PSVT. No other significant arrhythmias were observed. 3. Hyperlipidemia -  on most recent lipid panel. 4. Hypothyroidism - TSH and free T4 WNL on 4/19/22.  5 Moderate mitral regurgitation - Will repeat TTE in one year to monitor for progression. 6. Family history of CAD      Plan:     1. Given that palpitations have become much less frequent and overall ectopy burden was low on recent cardiac event monitor, would not recommend additional treatment at this time. 2. Her atypical chest pain appears to have resolved as well and may have been secondary to muscular chest wall pain. 3. Will obtain repeat TTE in ~1 year to monitor for progression of mitral regurgitation. 4. Her LDL remains suboptimally controlled. She had an adverse reaction to statins in the past and would like to work on trying to improve her lipid profile through dietary modifications and increased physical activity before trying additional medical therapy, which is not unreasonable given current calculated 10-year ASCVD score of 4.6%. 5. She can follow-up with me in one year. Gracia's attestation: This note was scribed in the presence of Alvaro Matson DO by Nemesio Handley RN      It is a pleasure to assist in the care of Rufus Kemp. Anoop Santana. Please call with any questions. The scribzaria documentation has been prepared under my direction and personally reviewed by me in its entirety. I confirm that the note above accurately reflects all work, treatment, procedures, and medical decision making performed by me.   I, Dr. Marisa Bonds, personally performed the services described in this documentation as scribed by Jill Jimenez RN in my presence, and it is both accurate and complete to the best of our ability.        Lilliana Cain, [facility]  [phone] [facility]  [phone] [facility]

## 2022-06-01 ENCOUNTER — OFFICE VISIT (OUTPATIENT)
Dept: CARDIOLOGY CLINIC | Age: 57
End: 2022-06-01
Payer: COMMERCIAL

## 2022-06-01 ENCOUNTER — HOSPITAL ENCOUNTER (OUTPATIENT)
Dept: NUCLEAR MEDICINE | Age: 57
Discharge: HOME | End: 2022-06-01
Payer: COMMERCIAL

## 2022-06-01 ENCOUNTER — HOSPITAL ENCOUNTER (OUTPATIENT)
Dept: NON INVASIVE DIAGNOSTICS | Age: 57
Discharge: HOME | End: 2022-06-01
Payer: COMMERCIAL

## 2022-06-01 ENCOUNTER — TELEPHONE (OUTPATIENT)
Dept: CARDIOLOGY CLINIC | Age: 57
End: 2022-06-01

## 2022-06-01 ENCOUNTER — HOSPITAL ENCOUNTER (OUTPATIENT)
Dept: ULTRASOUND IMAGING | Age: 57
Discharge: HOME | End: 2022-06-01
Payer: COMMERCIAL

## 2022-06-01 VITALS
HEART RATE: 88 BPM | SYSTOLIC BLOOD PRESSURE: 130 MMHG | BODY MASS INDEX: 30.22 KG/M2 | DIASTOLIC BLOOD PRESSURE: 78 MMHG | WEIGHT: 177 LBS | OXYGEN SATURATION: 96 % | HEIGHT: 64 IN

## 2022-06-01 DIAGNOSIS — R07.89 ATYPICAL CHEST PAIN: Primary | ICD-10-CM

## 2022-06-01 DIAGNOSIS — I34.0 MITRAL VALVE INSUFFICIENCY, UNSPECIFIED ETIOLOGY: ICD-10-CM

## 2022-06-01 DIAGNOSIS — E04.1 THYROID NODULE: ICD-10-CM

## 2022-06-01 DIAGNOSIS — E78.2 MIXED HYPERLIPIDEMIA: ICD-10-CM

## 2022-06-01 DIAGNOSIS — E03.9 HYPOTHYROIDISM, UNSPECIFIED TYPE: ICD-10-CM

## 2022-06-01 DIAGNOSIS — R07.89 ATYPICAL CHEST PAIN: ICD-10-CM

## 2022-06-01 DIAGNOSIS — R00.2 PALPITATIONS: ICD-10-CM

## 2022-06-01 DIAGNOSIS — Z82.49 FAMILY HISTORY OF CORONARY ARTERY DISEASE: ICD-10-CM

## 2022-06-01 LAB
LV EF: 58 %
LV EF: 60 %
LVEF MODALITY: NORMAL
LVEF MODALITY: NORMAL

## 2022-06-01 PROCEDURE — 6360000002 HC RX W HCPCS: Performed by: INTERNAL MEDICINE

## 2022-06-01 PROCEDURE — 3430000000 HC RX DIAGNOSTIC RADIOPHARMACEUTICAL: Performed by: INTERNAL MEDICINE

## 2022-06-01 PROCEDURE — 93306 TTE W/DOPPLER COMPLETE: CPT

## 2022-06-01 PROCEDURE — 76536 US EXAM OF HEAD AND NECK: CPT

## 2022-06-01 PROCEDURE — 78452 HT MUSCLE IMAGE SPECT MULT: CPT

## 2022-06-01 PROCEDURE — 93017 CV STRESS TEST TRACING ONLY: CPT

## 2022-06-01 PROCEDURE — A9502 TC99M TETROFOSMIN: HCPCS | Performed by: INTERNAL MEDICINE

## 2022-06-01 PROCEDURE — 99214 OFFICE O/P EST MOD 30 MIN: CPT | Performed by: INTERNAL MEDICINE

## 2022-06-01 RX ORDER — AMINOPHYLLINE DIHYDRATE 25 MG/ML
75 INJECTION, SOLUTION INTRAVENOUS ONCE
Status: DISCONTINUED | OUTPATIENT
Start: 2022-06-01 | End: 2022-06-01 | Stop reason: CLARIF

## 2022-06-01 RX ADMIN — TETROFOSMIN 11 MILLICURIE: 1.38 INJECTION, POWDER, LYOPHILIZED, FOR SOLUTION INTRAVENOUS at 08:16

## 2022-06-01 RX ADMIN — REGADENOSON 0.4 MG: 0.08 INJECTION, SOLUTION INTRAVENOUS at 10:15

## 2022-06-01 RX ADMIN — AMINOPHYLLINE 75 MG: 25 INJECTION, SOLUTION INTRAVENOUS at 10:24

## 2022-06-01 RX ADMIN — TETROFOSMIN 32.1 MILLICURIE: 1.38 INJECTION, POWDER, LYOPHILIZED, FOR SOLUTION INTRAVENOUS at 10:15

## 2022-06-01 NOTE — TELEPHONE ENCOUNTER
----- Message from Keiko Kim DO sent at 6/1/2022  1:30 PM EDT -----  Please let patient know that her recent stress test shows no evidence of severe blockages in the heart arteries. Her TTE (heart ultrasound) showed overall normal heart pumping function. The left upper chamber of the heart appeared mild-moderately enlarged. There was moderate \"leakiness\" of her mitral valve and we will plan to repeat a TTE in one year to monitor for progression of mitral regurgitation. Thanks.

## 2022-06-01 NOTE — PROGRESS NOTES
Pt BP elevated, Dr. Oscar Solomon informed. Pt converted to [facility] stress test per Dr. Oscar Solomon.

## 2022-06-01 NOTE — PROGRESS NOTES
Pt completed stress portion of cardiac stress test. Pt c/o nausea. Aminophylline 75mg IVP per protocol, with stated relief. Pt is discharged to nuclear department for final scan. Nuclear tech will remove PIV. Discharge instructions given to pt. Pt verbalizes understanding to discharge instructions.

## 2022-06-01 NOTE — PATIENT INSTRUCTIONS
Plan:     1. Recommend repeat Echo in 1 year to monitor mitral valve as discussed  2.  Follow up with me in 1 year or sooner if needed

## 2022-06-02 DIAGNOSIS — E04.1 THYROID NODULE: Primary | ICD-10-CM

## 2022-06-02 NOTE — RESULT ENCOUNTER NOTE
We had ordered a thyroglobulin level. I do not see a result. Regarding the thyroid ultrasound, no significant increase in size. Overall appears stable. Prior biopsy negative. No specific follow-up recommended.

## 2022-06-06 ASSESSMENT — ENCOUNTER SYMPTOMS: COUGH: 0

## 2022-06-08 LAB — THYROGLOBULIN BY LC-MS/MS, SERUM/PLASMA: 60.6 NG/ML (ref 1.3–31.8)

## 2022-06-08 NOTE — RESULT ENCOUNTER NOTE
Thyroglobulin nearly identical to last check. Still elevated. Endocrinology appointment as discussed. Could consider also following up with the ENT.

## 2022-06-15 ENCOUNTER — OFFICE VISIT (OUTPATIENT)
Dept: ENT CLINIC | Age: 57
End: 2022-06-15
Payer: COMMERCIAL

## 2022-06-15 ENCOUNTER — TELEPHONE (OUTPATIENT)
Dept: FAMILY MEDICINE CLINIC | Age: 57
End: 2022-06-15

## 2022-06-15 VITALS
WEIGHT: 178.4 LBS | DIASTOLIC BLOOD PRESSURE: 87 MMHG | HEART RATE: 73 BPM | SYSTOLIC BLOOD PRESSURE: 138 MMHG | HEIGHT: 64 IN | BODY MASS INDEX: 30.46 KG/M2 | TEMPERATURE: 98.1 F

## 2022-06-15 DIAGNOSIS — E04.1 THYROID NODULE: Primary | ICD-10-CM

## 2022-06-15 DIAGNOSIS — E03.9 HYPOTHYROIDISM, UNSPECIFIED TYPE: ICD-10-CM

## 2022-06-15 DIAGNOSIS — E01.0 THYROMEGALY: ICD-10-CM

## 2022-06-15 PROCEDURE — 99203 OFFICE O/P NEW LOW 30 MIN: CPT | Performed by: OTOLARYNGOLOGY

## 2022-06-15 NOTE — PROGRESS NOTES
[address], [address], [address] [place] [address]  P: [phone]       Patient     Thamas Roof  1965    ChiefComplaint     Chief Complaint   Patient presents with    Thyroid Problem     States she has a goiter on her thyroid, states had an US completed a year ago and had bx (benign). Had another [address] and there was no change. History of Present Illness     Lesli Siddiqui is a 29-year-old female here today for evaluation of left thyroid nodule. Has had it previously biopsied was benign, has decreased in size since previous imaging. TSH and T4 are normal but elevated thyroglobulin level. Has referral for endocrine. Does have episodes where thyroid becomes intermittently tender and swollen with associated palpitations.     Past Medical History     Past Medical History:   Diagnosis Date    Hyperlipidemia     Hypothyroidism     Rash     Thyroid disease     TMJ dysfunction        Past Surgical History     Past Surgical History:   Procedure Laterality Date    ABDOMEN SURGERY       SECTION      CHOLECYSTECTOMY, LAPAROSCOPIC  1/10/11    COLONOSCOPY  12    HERNIA REPAIR      HYSTERECTOMY (CERVIX STATUS UNKNOWN)      HYSTERECTOMY, TOTAL ABDOMINAL (CERVIX REMOVED)      HYSTERECTOMY, VAGINAL      US THYROID BIOPSY  2021    US THYROID BIOPSY 2021 [facility] ULTRASOUND       Family History     Family History   Problem Relation Age of Onset    Heart Disease Father     High Blood Pressure Mother     High Cholesterol Mother     Kidney Disease Mother         renal polycystic     Arthritis Mother     Early Death Paternal [de-identified]     Breast Cancer Sister        Social History     Social History     Tobacco Use    Smoking status: Former Smoker     Packs/day: 1.00     Years: 15.00     Pack years: 15.00     Quit date: 1991     Years since quittin.4    Smokeless tobacco: Never Used   Vaping Use    Vaping Use: Never used   Substance Use Topics    Alcohol use: No    Drug use: No        Allergies     No Known Allergies    Medications     Current Outpatient Medications   Medication Sig Dispense Refill    levothyroxine (SYNTHROID) 88 MCG tablet TAKE 1 TABLET BY MOUTH EVERY DAY 90 tablet 3    estradiol (ESTRACE) 1 MG tablet Take 1 mg by mouth daily      Cholecalciferol (VITAMIN D3 PO) Take by mouth       No current facility-administered medications for this visit. Review of Systems     Review of Systems   Constitutional: Negative for appetite change, chills, fatigue, fever and unexpected weight change. HENT: Negative for congestion, ear discharge, ear pain, facial swelling, hearing loss, nosebleeds, postnasal drip, sinus pressure, sneezing, sore throat, tinnitus, trouble swallowing and voice change. Eyes: Negative for itching. Respiratory: Negative for apnea, cough and shortness of breath. Endocrine: Negative for cold intolerance and heat intolerance. Musculoskeletal: Negative for myalgias and neck pain. Skin: Negative for rash. Allergic/Immunologic: Negative for environmental allergies. Neurological: Negative for dizziness and headaches. Psychiatric/Behavioral: Negative for confusion, decreased concentration and sleep disturbance. PhysicalExam     Vitals:    06/15/22 1505 06/15/22 1507   BP:  138/87   Site: Left Upper Arm Left Upper Arm   Position: Sitting Sitting   Cuff Size: Medium Adult Medium Adult   Pulse:  73   Temp: 98.1 °F (36.7 °C)    TempSrc: Infrared    Weight: 178 lb 6.4 oz (80.9 kg)    Height: 5' 4\" (1.626 m)        Physical Exam  Constitutional:       General: She is not in acute distress. Appearance: She is well-developed. HENT:      Head: Normocephalic and atraumatic. Right Ear: Tympanic membrane, ear canal and external ear normal. No drainage. No middle ear effusion. Tympanic membrane is not bulging. Tympanic membrane has normal mobility.       Left Ear: Tympanic membrane, ear canal and external ear normal. No drainage. No middle ear effusion. Tympanic membrane is not bulging. Tympanic membrane has normal mobility. Nose: No mucosal edema or rhinorrhea. Mouth/Throat:      Lips: Pink. Mouth: Mucous membranes are moist.      Tongue: No lesions. Palate: No mass. Pharynx: Uvula midline. Eyes:      Pupils: Pupils are equal, round, and reactive to light. Neck:      Thyroid: Thyroid mass (left thyroid nodule) present. No thyromegaly. Trachea: Trachea and phonation normal.   Cardiovascular:      Pulses: Normal pulses. Pulmonary:      Effort: Pulmonary effort is normal. No accessory muscle usage or respiratory distress. Breath sounds: No stridor. Musculoskeletal:      Cervical back: Full passive range of motion without pain. Lymphadenopathy:      Head:      Right side of head: No submental or submandibular adenopathy. Left side of head: No submental or submandibular adenopathy. Cervical: No cervical adenopathy. Right cervical: No superficial, deep or posterior cervical adenopathy. Left cervical: No superficial, deep or posterior cervical adenopathy. Skin:     General: Skin is warm and dry. Neurological:      Mental Status: She is alert and oriented to person, place, and time. Cranial Nerves: No cranial nerve deficit. Coordination: Coordination normal.      Gait: Gait normal.   Psychiatric:         Thought Content: Thought content normal.         Assessment and Plan     1. Thyroid nodule  -Discussed ultrasound report, no indication for excision at this time  -Plan to follow-up with endocrinology for better evaluation of thyroglobulin    Return as needed    Eddie Vanegas DO  6/16/22      Portions of this note were dictated using Dragon.  There may be linguistic errors secondary to the use of this program.

## 2022-06-16 ASSESSMENT — ENCOUNTER SYMPTOMS
VOICE CHANGE: 0
EYE ITCHING: 0
TROUBLE SWALLOWING: 0
SHORTNESS OF BREATH: 0
COUGH: 0
SINUS PRESSURE: 0
FACIAL SWELLING: 0
SORE THROAT: 0
APNEA: 0

## 2022-06-29 ENCOUNTER — HOSPITAL ENCOUNTER (OUTPATIENT)
Dept: WOMENS IMAGING | Age: 57
Discharge: HOME | End: 2022-06-29
Payer: COMMERCIAL

## 2022-06-29 VITALS — WEIGHT: 165 LBS | HEIGHT: 64 IN | BODY MASS INDEX: 28.17 KG/M2

## 2022-06-29 DIAGNOSIS — Z12.31 ENCOUNTER FOR SCREENING MAMMOGRAM FOR BREAST CANCER: ICD-10-CM

## 2022-06-29 PROCEDURE — 77063 BREAST TOMOSYNTHESIS BI: CPT

## 2022-06-29 PROCEDURE — 77067 SCR MAMMO BI INCL CAD: CPT

## 2022-06-30 ENCOUNTER — HOSPITAL ENCOUNTER (OUTPATIENT)
Dept: WOMENS IMAGING | Age: 57
Discharge: HOME | End: 2022-06-30
Payer: COMMERCIAL

## 2022-06-30 ENCOUNTER — HOSPITAL ENCOUNTER (OUTPATIENT)
Dept: ULTRASOUND IMAGING | Age: 57
Discharge: HOME | End: 2022-06-30
Payer: COMMERCIAL

## 2022-06-30 DIAGNOSIS — N63.20 LEFT BREAST MASS: ICD-10-CM

## 2022-06-30 DIAGNOSIS — N63.20 LEFT BREAST MASS: Primary | ICD-10-CM

## 2022-06-30 PROCEDURE — G0279 TOMOSYNTHESIS, MAMMO: HCPCS

## 2022-06-30 PROCEDURE — 76642 ULTRASOUND BREAST LIMITED: CPT

## 2022-09-29 ENCOUNTER — TELEPHONE (OUTPATIENT)
Dept: SURGERY | Age: 57
End: 2022-09-29

## 2022-09-29 DIAGNOSIS — R19.09 LEFT GROIN MASS: Primary | ICD-10-CM

## 2022-09-29 NOTE — TELEPHONE ENCOUNTER
This patient was seen at [facility] today. She needs MRI L hip. I put the order in Epic. Please call her to arrange.

## 2022-09-29 NOTE — TELEPHONE ENCOUNTER
I spoke with patient and she said to schedule this at [facility] due to her previous US was done here. [facility] next available for MRI is 10/14/22. Would it be ok to try another facility such as [facility] or [facility], to get her in sooner? She states she is having a lot of pain.

## 2022-10-03 RX ORDER — LEVOTHYROXINE SODIUM 88 UG/1
TABLET ORAL
Qty: 90 TABLET | Refills: 3 | Status: SHIPPED | OUTPATIENT
Start: 2022-10-03

## 2022-10-03 NOTE — TELEPHONE ENCOUNTER
I spoke with patient. I called central scheduling and moved her appt to 10/11/2022 at [facility]. Patient is aware.

## 2022-10-11 ENCOUNTER — HOSPITAL ENCOUNTER (OUTPATIENT)
Dept: MRI IMAGING | Age: 57
Discharge: HOME | End: 2022-10-11
Payer: COMMERCIAL

## 2022-10-11 DIAGNOSIS — R19.09 LEFT GROIN MASS: ICD-10-CM

## 2022-10-11 PROCEDURE — A9579 GAD-BASE MR CONTRAST NOS,1ML: HCPCS | Performed by: SURGERY

## 2022-10-11 PROCEDURE — 73723 MRI JOINT LWR EXTR W/O&W/DYE: CPT

## 2022-10-11 PROCEDURE — 6360000004 HC RX CONTRAST MEDICATION: Performed by: SURGERY

## 2022-10-11 RX ADMIN — GADOTERIDOL 15 ML: 279.3 INJECTION, SOLUTION INTRAVENOUS at 09:50

## 2022-10-12 ENCOUNTER — TELEPHONE (OUTPATIENT)
Dept: SURGERY | Age: 57
End: 2022-10-12

## 2022-10-12 NOTE — TELEPHONE ENCOUNTER
I reviewed her MRI. The lump in the groin extends close to the deep muscles. I recommend referral to a specialist in these types of growths. I gave her contact information for Dr. Stephon Schaefer [phone] to make an appointment. I explained this may be a cancer. She understands the importance of getting an appointment with him.

## 2022-10-20 NOTE — PROGRESS NOTES
Jeremi Olszewski    Age 59 y.o.    female    1965    MRN [id number]    11/2/2022  Arrival Time_____________  OR Time____________[address]     Procedure(s):  EXCISION LEFT HIP LIPOMA                      General   Surgeon(s):  Amie Solo, MD      DAY ADMIT ___  SDS/OP ___  OUTPT IN BED ___        Phone [phone] (home)     PCP _____________________ Phone_________________ Epic ( ) Epic CE ( ) Appt ________    ADDITIONAL INFO __________________________________ Cardio/Consult _____________    NOTES _____________________________________________________________________    ____________________________________________________________________________    PAT APPT DATE:________ TIME: ________  FAXED QAD: _______  (__) H&P w/ Hospitalist  __________________________________________________________________________  Preop Nurse phone screen complete: _____________  (__) CBC     (__) W/ DIFF ___________     (__) Hgb A1C    ___________  (__) CHEST X RAY   __________  (__) LIPID PROFILE  ___________  (__) EKG   __________  (__) PT/PTT   ___________  (__) PFT's   __________  (__) BMP   ___________  (__) CAROTIDS  __________  (__) CMP   ___________  (__) VEIN MAPPING  __________  (__) U/A   ___________  (__) HISTORY & PHYSICAL __________  (__) URINE C & S  ___________  (__) CARDIAC CLEARANCE __________  (__) U/A W/ FLEX  ___________  (__) PULM.  CLEARANCE __________  (__) SERUM PREGNANCY ___________  (__) Check Epic DOS orders __________  (__) TYPE & SCREEN __________repeat ( ) (__)  __________________ __________  (__) ALBUMIN Vasyl Pinks ___________  (__)  __________________ __________  (__) TRANSFERRIN  ___________  (__)  __________________ __________  (__) LIVER PROFILE  ___________  (__)  __________________ __________  (__) MRSA NASAL SWAB ___________  (__) URINE PREG DOS __________  (__) SED RATE  ___________  (__) BLOOD SUGAR DOS __________  (__) C-REACTIVE PROTEIN ___________    (__) VITAMIN D HYDROXY ___________  (__) BLOOD THINNERS __________    (__) ACE/ ARBS: _____________________     (__) BETABLOCKERS __________________

## 2022-10-24 ENCOUNTER — OFFICE VISIT (OUTPATIENT)
Dept: FAMILY MEDICINE CLINIC | Age: 57
End: 2022-10-24
Payer: COMMERCIAL

## 2022-10-24 ENCOUNTER — TELEPHONE (OUTPATIENT)
Dept: CARDIOLOGY CLINIC | Age: 57
End: 2022-10-24

## 2022-10-24 VITALS
WEIGHT: 177 LBS | DIASTOLIC BLOOD PRESSURE: 86 MMHG | HEIGHT: 64 IN | SYSTOLIC BLOOD PRESSURE: 122 MMHG | OXYGEN SATURATION: 96 % | BODY MASS INDEX: 30.22 KG/M2 | HEART RATE: 78 BPM

## 2022-10-24 DIAGNOSIS — Z01.818 PREOP EXAMINATION: Primary | ICD-10-CM

## 2022-10-24 DIAGNOSIS — E03.9 HYPOTHYROIDISM, UNSPECIFIED TYPE: ICD-10-CM

## 2022-10-24 DIAGNOSIS — D17.24 LIPOMA OF LEFT LOWER EXTREMITY: ICD-10-CM

## 2022-10-24 PROCEDURE — 99213 OFFICE O/P EST LOW 20 MIN: CPT | Performed by: FAMILY MEDICINE

## 2022-10-24 ASSESSMENT — ENCOUNTER SYMPTOMS
TROUBLE SWALLOWING: 0
SHORTNESS OF BREATH: 0

## 2022-10-24 NOTE — H&P (VIEW-ONLY)
Aleena Lozano  YOB: 1965    Date of Service:  10/24/2022      Wt Readings from Last 2 Encounters:   10/24/22 177 lb (80.3 kg)   10/11/22 165 lb (74.8 kg)       BP Readings from Last 3 Encounters:   10/24/22 122/86   06/15/22 138/87   22 130/78        Chief Complaint   Patient presents with    Pre-op Exam     Patient is having an excision of a left hip lipoma at [facility] with Dr. Bill Ramirez on 22. No Known Allergies    Outpatient Medications Marked as Taking for the 10/24/22 encounter (Office Visit) with John Paul Fontana MD   Medication Sig Dispense Refill    levothyroxine (SYNTHROID) 88 MCG tablet TAKE 1 TABLET BY MOUTH EVERY DAY 90 tablet 3    estradiol (ESTRACE) 1 MG tablet Take 1 mg by mouth daily      Cholecalciferol (VITAMIN D3 PO) Take by mouth         This patient presents to the office today for a preoperative consultation at the request of surgeon, Dr. Bill Ramirez, who plans on performing an excision of a left hip lipoma on  at [facility].  The current problem began 2-3  years ago, and symptoms have been worsening with time. Conservative therapy: N/A. Inc in size and pain. More bothersome. Plans on removal.     Hx of elevated thyroglobulin. Had seen ent. Has not seen endo.       Planned anesthesia: General   Known anesthesia problems: None   Bleeding risk: No recent or remote history of abnormal bleeding  Personal or FH of DVT/PE: No    Patient objection to receiving blood products: No    Past Medical History:   Diagnosis Date    Hyperlipidemia     Hypothyroidism     Rash     Thyroid disease     TMJ dysfunction        Past Surgical History:   Procedure Laterality Date    ABDOMEN SURGERY       SECTION      CHOLECYSTECTOMY, LAPAROSCOPIC  1/10/11    COLONOSCOPY  12    HERNIA REPAIR      HYSTERECTOMY (CERVIX STATUS UNKNOWN)      HYSTERECTOMY, TOTAL ABDOMINAL (CERVIX REMOVED)      HYSTERECTOMY, VAGINAL      US THYROID BIOPSY  2021 US THYROID BIOPSY 2021 [place] ULTR[place]OUND       Family History   Problem Relation Age of Onset    Heart Disease Father     High Blood Pressure Mother     High Cholesterol Mother     Kidney Disease Mother         renal polycystic     Arthritis Mother     Early Death Paternal Aunt     Breast Cancer Sister     Endometrial Cancer Sister     Breast Cancer Maternal Grandmother        Social History     Socioeconomic History    Marital status:      Spouse name: Not on file    Number of children: Not on file    Years of education: Not on file    Highest education level: Not on file   Occupational History    Not on file   Tobacco Use    Smoking status: Former     Packs/day: 1.00     Years: 15.00     Pack years: 15.00     Types: Cigarettes     Quit date: 1991     Years since quittin.8    Smokeless tobacco: Never   Vaping Use    Vaping Use: Never used   Substance and Sexual Activity    Alcohol use: No    Drug use: No    Sexual activity: Not on file   Other Topics Concern    Not on file   Social History Narrative    Not on file     Social Determinants of Health     Financial Resource Strain: Not on file   Food Insecurity: Not on file   Transportation Needs: Not on file   Physical Activity: Not on file   Stress: Not on file   Social Connections: Not on file   Intimate Partner Violence: Not on file   Housing Stability: Not on file       Review of Systems  Review of Systems   Constitutional:  Negative for fever. HENT:  Negative for trouble swallowing. Respiratory:  Negative for shortness of breath. Vitals:    10/24/22 1615   BP: 122/86   Pulse: 78   SpO2: 96%   Weight: 177 lb (80.3 kg)   Height: 5' 4\" (1.626 m)        Physical Exam   Physical Exam  Constitutional:       Appearance: She is well-developed. HENT:      Head: Normocephalic and atraumatic. Right Ear: A middle ear effusion is present. Left Ear: A middle ear effusion is present.    Eyes:      Conjunctiva/sclera: Conjunctivae normal.   Neck:      Vascular: No carotid bruit. Trachea: No tracheal deviation. Cardiovascular:      Rate and Rhythm: Normal rate and regular rhythm. Pulmonary:      Effort: Pulmonary effort is normal.      Breath sounds: Normal breath sounds. Abdominal:      Palpations: Abdomen is soft. Tenderness: There is no abdominal tenderness. Skin:     General: Skin is warm and dry. Neurological:      Mental Status: She is alert and oriented to person, place, and time. Assessment:       59 y.o. patient with planned surgery as above. Known risk factors for perioperative complications: None  Current medications which may produce withdrawal symptoms if withheld perioperatively: none   1. Preop examination  Patient has called cardiology for clearance. She is awaiting that. Otherwise we will check labs as below. Otherwise no reason that the patient cannot proceed with planned procedure  - CBC with Auto Differential; Future  - Basic Metabolic Panel; Future  - Protime-INR; Future  - APTT; Future    2. Lipoma of left lower extremity  See above. Plans for excision    3. Hypothyroidism, unspecified type  Ongoing elevation in thyroglobulin. Repeat thyroid labs and thyroglobulin level. She has been seen by ENT. Would like to see endocrinology. Will await labs and decide how to proceed  - THYROGLOBULIN; Future  - TSH; Future  - T4, Free; Future    Patient mentioned at the end of the visit with some sensation of fluid in her ears and some drainage. We discussed using Flonase and a diffuser in the bedroom at night. We also discussed staying well-hydrated. No evidence of infection today   Plan:     1. Preoperative workup as follows:hemoglobin, hematocrit, electrolytes, creatinine, coagulation studies  2. Change in medication regimen before surgery: Discontinue NSAIDs (ibu) 5 days before surgery  3.  Prophylaxis forcardiac events with perioperative beta-blockers: Not indicated  ACC/AHA indications for pre-operative beta-blocker use:    Vascular surgery with history of postitive stress test  Intermediate or high risk surgery with history of CAD   Intermediate or high risk surgery with multiple clinical predictors of CAD- 2 of the following: history of compensated or prior heart failure, history of cerebrovascular disease, DM, or renal insufficiency    Routine administration of higher-dose, long-acting metoprolol in beta-blocker-naïve patients on the day of surgery, and in the absence of dose titration is with an overall increase in mortality. Beta-blockers should be started days to weeks prior to surgery and titrated to pulse < 70.  4. Deep vein thrombosis prophylaxis: regimen to be chosen by surgical team  5. Cleared once cleared by cardio.

## 2022-10-24 NOTE — PROGRESS NOTES
Srinivas Lopez  YOB: 1965    Date of Service:  10/24/2022      Wt Readings from Last 2 Encounters:   10/24/22 177 lb (80.3 kg)   10/11/22 165 lb (74.8 kg)       BP Readings from Last 3 Encounters:   10/24/22 122/86   06/15/22 138/87   22 130/78        Chief Complaint   Patient presents with    Pre-op Exam     Patient is having an excision of a left hip lipoma at [facility] with Dr. Bi Paniagua on 22. No Known Allergies    Outpatient Medications Marked as Taking for the 10/24/22 encounter (Office Visit) with Rosi Huerta MD   Medication Sig Dispense Refill    levothyroxine (SYNTHROID) 88 MCG tablet TAKE 1 TABLET BY MOUTH EVERY DAY 90 tablet 3    estradiol (ESTRACE) 1 MG tablet Take 1 mg by mouth daily      Cholecalciferol (VITAMIN D3 PO) Take by mouth         This patient presents to the office today for a preoperative consultation at the request of surgeon, Dr. Bi Paniagua, who plans on performing an excision of a left hip lipoma on  at [facility].  The current problem began 2-3  years ago, and symptoms have been worsening with time. Conservative therapy: N/A. Inc in size and pain. More bothersome. Plans on removal.     Hx of elevated thyroglobulin. Had seen ent. Has not seen endo.       Planned anesthesia: General   Known anesthesia problems: None   Bleeding risk: No recent or remote history of abnormal bleeding  Personal or FH of DVT/PE: No    Patient objection to receiving blood products: No    Past Medical History:   Diagnosis Date    Hyperlipidemia     Hypothyroidism     Rash     Thyroid disease     TMJ dysfunction        Past Surgical History:   Procedure Laterality Date    ABDOMEN SURGERY       SECTION      CHOLECYSTECTOMY, LAPAROSCOPIC  1/10/11    COLONOSCOPY  12    HERNIA REPAIR      HYSTERECTOMY (CERVIX STATUS UNKNOWN)      HYSTERECTOMY, TOTAL ABDOMINAL (CERVIX REMOVED)      HYSTERECTOMY, VAGINAL      US THYROID BIOPSY  2021 US THYROID BIOPSY [place]       Family History   Problem Relation Age of Onset    Heart Disease Father     High Blood Pressure Mother     High Cholesterol Mother     Kidney Disease Mother         renal polycystic     Arthritis Mother     Early Death Paternal Aunt     Breast Cancer Sister     Endometrial Cancer Sister     Breast Cancer Maternal Grandmother        Social History     Socioeconomic History    Marital status:      Spouse name: Not on file    Number of children: Not on file    Years of education: Not on file    Highest education level: Not on file   Occupational History    Not on file   Tobacco Use    Smoking status: Former     Packs/day: 1.00     Years: 15.00     Pack years: 15.00     Types: Cigarettes     Quit date: 1991     Years since quittin.8    Smokeless tobacco: Never   Vaping Use    Vaping Use: Never used   Substance and Sexual Activity    Alcohol use: No    Drug use: No    Sexual activity: Not on file   Other Topics Concern    Not on file   Social History Narrative    Not on file     Social Determinants of Health     Financial Resource Strain: Not on file   Food Insecurity: Not on file   Transportation Needs: Not on file   Physical Activity: Not on file   Stress: Not on file   Social Connections: Not on file   Intimate Partner Violence: Not on file   Housing Stability: Not on file       Review of Systems  Review of Systems   Constitutional:  Negative for fever. HENT:  Negative for trouble swallowing. Respiratory:  Negative for shortness of breath. Vitals:    10/24/22 1615   BP: 122/86   Pulse: 78   SpO2: 96%   Weight: 177 lb (80.3 kg)   Height: 5' 4\" (1.626 m)        Physical Exam   Physical Exam  Constitutional:       Appearance: She is well-developed. HENT:      Head: Normocephalic and atraumatic. Right Ear: A middle ear effusion is present. Left Ear: A middle ear effusion is present.    Eyes:      Conjunctiva/sclera: Conjunctivae normal.   Neck:      Vascular: No carotid bruit. Trachea: No tracheal deviation. Cardiovascular:      Rate and Rhythm: Normal rate and regular rhythm. Pulmonary:      Effort: Pulmonary effort is normal.      Breath sounds: Normal breath sounds. Abdominal:      Palpations: Abdomen is soft. Tenderness: There is no abdominal tenderness. Skin:     General: Skin is warm and dry. Neurological:      Mental Status: She is alert and oriented to person, place, and time. Assessment:       59 y.o. patient with planned surgery as above. Known risk factors for perioperative complications: None  Current medications which may produce withdrawal symptoms if withheld perioperatively: none   1. Preop examination  Patient has called cardiology for clearance. She is awaiting that. Otherwise we will check labs as below. Otherwise no reason that the patient cannot proceed with planned procedure  - CBC with Auto Differential; Future  - Basic Metabolic Panel; Future  - Protime-INR; Future  - APTT; Future    2. Lipoma of left lower extremity  See above. Plans for excision    3. Hypothyroidism, unspecified type  Ongoing elevation in thyroglobulin. Repeat thyroid labs and thyroglobulin level. She has been seen by ENT. Would like to see endocrinology. Will await labs and decide how to proceed  - THYROGLOBULIN; Future  - TSH; Future  - T4, Free; Future    Patient mentioned at the end of the visit with some sensation of fluid in her ears and some drainage. We discussed using Flonase and a diffuser in the bedroom at night. We also discussed staying well-hydrated. No evidence of infection today   Plan:     1. Preoperative workup as follows:hemoglobin, hematocrit, electrolytes, creatinine, coagulation studies  2. Change in medication regimen before surgery: Discontinue NSAIDs (ibu) 5 days before surgery  3.  Prophylaxis forcardiac events with perioperative beta-blockers: Not indicated  ACC/AHA indications for pre-operative beta-blocker use:    Vascular surgery with history of postitive stress test  Intermediate or high risk surgery with history of CAD   Intermediate or high risk surgery with multiple clinical predictors of CAD- 2 of the following: history of compensated or prior heart failure, history of cerebrovascular disease, DM, or renal insufficiency    Routine administration of higher-dose, long-acting metoprolol in beta-blocker-naïve patients on the day of surgery, and in the absence of dose titration is with an overall increase in mortality. Beta-blockers should be started days to weeks prior to surgery and titrated to pulse < 70.  4. Deep vein thrombosis prophylaxis: regimen to be chosen by surgical team  5. Cleared once cleared by cardio.

## 2022-10-24 NOTE — TELEPHONE ENCOUNTER
CARDIAC CLEARANCE REQUEST    What type of procedure are you having: removal of tumor on hip benign    Are you taking any blood thinners: no    When is your procedure scheduled for: 11/2/22    What physician is performing your procedure:  Bowen Wilhelm  Phone Number:[phone]    Fax number to send the letter:   Notify pt also.  Ty

## 2022-10-25 ENCOUNTER — HOSPITAL ENCOUNTER (OUTPATIENT)
Age: 57
Discharge: HOME | End: 2022-10-25
Payer: COMMERCIAL

## 2022-10-25 LAB
ANION GAP SERPL CALCULATED.3IONS-SCNC: 10 MMOL/L (ref 3–16)
ANTI-THYROGLOB ABS: 16 IU/ML
APTT: 29 SEC (ref 23–34.3)
BASOPHILS ABSOLUTE: 0 K/UL (ref 0–0.2)
BASOPHILS RELATIVE PERCENT: 0.9 %
BUN BLDV-MCNC: 11 MG/DL (ref 7–20)
CALCIUM SERPL-MCNC: 9.3 MG/DL (ref 8.3–10.6)
CHLORIDE BLD-SCNC: 101 MMOL/L (ref 99–110)
CO2: 26 MMOL/L (ref 21–32)
CREAT SERPL-MCNC: <0.5 MG/DL (ref 0.6–1.1)
EOSINOPHILS ABSOLUTE: 0.1 K/UL (ref 0–0.6)
EOSINOPHILS RELATIVE PERCENT: 2.1 %
GFR SERPL CREATININE-BSD FRML MDRD: >60 ML/MIN/{1.73_M2}
GLUCOSE BLD-MCNC: 90 MG/DL (ref 70–99)
HCT VFR BLD CALC: 38.7 % (ref 36–48)
HEMOGLOBIN: 13.4 G/DL (ref 12–16)
INR BLD: 0.95 (ref 0.87–1.14)
LYMPHOCYTES ABSOLUTE: 1.3 K/UL (ref 1–5.1)
LYMPHOCYTES RELATIVE PERCENT: 31.2 %
MCH RBC QN AUTO: 31.7 PG (ref 26–34)
MCHC RBC AUTO-ENTMCNC: 34.7 G/DL (ref 31–36)
MCV RBC AUTO: 91.3 FL (ref 80–100)
MONOCYTES ABSOLUTE: 0.3 K/UL (ref 0–1.3)
MONOCYTES RELATIVE PERCENT: 8.2 %
NEUTROPHILS ABSOLUTE: 2.4 K/UL (ref 1.7–7.7)
NEUTROPHILS RELATIVE PERCENT: 57.6 %
PDW BLD-RTO: 12.5 % (ref 12.4–15.4)
PLATELET # BLD: 174 K/UL (ref 135–450)
PMV BLD AUTO: 9.6 FL (ref 5–10.5)
POTASSIUM SERPL-SCNC: 3.9 MMOL/L (ref 3.5–5.1)
PROTHROMBIN TIME: 12.6 SEC (ref 11.7–14.5)
RBC # BLD: 4.24 M/UL (ref 4–5.2)
SODIUM BLD-SCNC: 137 MMOL/L (ref 136–145)
T4 FREE: 1.3 NG/DL (ref 0.9–1.8)
TSH SERPL DL<=0.05 MIU/L-ACNC: 2.39 UIU/ML (ref 0.27–4.2)
WBC # BLD: 4.2 K/UL (ref 4–11)

## 2022-10-25 PROCEDURE — 85025 COMPLETE CBC W/AUTO DIFF WBC: CPT

## 2022-10-25 PROCEDURE — 84443 ASSAY THYROID STIM HORMONE: CPT

## 2022-10-25 PROCEDURE — 85610 PROTHROMBIN TIME: CPT

## 2022-10-25 PROCEDURE — 85730 THROMBOPLASTIN TIME PARTIAL: CPT

## 2022-10-25 PROCEDURE — 86800 THYROGLOBULIN ANTIBODY: CPT

## 2022-10-25 PROCEDURE — 36415 COLL VENOUS BLD VENIPUNCTURE: CPT

## 2022-10-25 PROCEDURE — 84439 ASSAY OF FREE THYROXINE: CPT

## 2022-10-25 PROCEDURE — 80048 BASIC METABOLIC PNL TOTAL CA: CPT

## 2022-10-25 NOTE — RESULT ENCOUNTER NOTE
Thyroid labs pending. Patient is interested in an endocrinology referral.  Could refer to endocrinology at [facility].  BMP, CBC, coagulation studies okay.   Okay to send along with preop paperwork

## 2022-10-26 DIAGNOSIS — E03.9 HYPOTHYROIDISM, UNSPECIFIED TYPE: ICD-10-CM

## 2022-10-27 NOTE — PROGRESS NOTES
1. Do not eat or drink anything after 12 midnight prior to surgery. This includes no water, chewing gum mints, or ice chips. You may brush your teeth and gargle the day of surgery but DO NOT SWALLOW THE WATER. 2. Please see your family doctor/pediatrician for a history and physical and/or concerning medications. Bring any test results/reports from your physician's office. If you are under the care of a heart doctor or specialist please be aware that you may be asked to see him or her for clearance. 3. You may be asked to stop blood thinners such as Coumadin, Plavix, Fragmin, and Lovenox or Anti-inflammatories such as Aspirin, Ibuprofen, Advil, and Naproxen prior to your surgery. Please check with your doctor before stopping these or any other medications. 4. Do not smoke, and do not drink any alcoholic beverages 24 hours prior to surgery. 5. You MUST make arrangements for a responsible adult to take you home after your surgery. For your safety, you will not be allowed to leave alone or drive yourself home. Your surgery will be cancelled if you do not have a ride home. Also for your safety, it is strongly suggested someone stay with you the first 24 hrs after your surgery. 6. A parent/legal guardian must accompany a child scheduled for surgery and plan to stay at the hospital until the child is discharged. Please do not bring other children with you. 7. For your comfort,please wear simple, loose fitting clothing to the hospital.  Please do not bring valuables (money, credit cards, checkbooks, etc.) Do not wear any makeup (including no eye makeup) or nail polish on your fingers or toes. 8. For your safety, please DO NOT wear any jewelry or piercings on day of surgery. All body piercing jewelry must be removed. 9. If you have dentures, they will be removed before going to the OR; for your convenience we will provide you with a container.   If you wear contact lenses or glasses, they will be removed, they will be removed, please bring a case for them. 10. If appicable,Please see your family doctor/pediatrician for a history & physical and/or concerning medications. Bring any test results/reports from your physician's office. 11. Remember to bring Blood Bank bracelet to the hospital on the day of surgery. 12. If you have a Living Will and Durable Power of  for Healthcare, please bring in a copy. 15. Notify your Surgeon if you develop any illness between now and surgery  time, cough, cold, fever, sore throat, nausea, vomiting, etc.  Please notify your surgeon if you experience dizziness, shortness of breath or blurred vision between now & the time of your surgery   14. DO NOT shave your operative site 96 hours prior to surgery. For face & neck surgery, men may use an electric razor 48 hours prior to surgery. 15. Shower the night before surgery with _x__Antibacterial soap ___Hibiclens   16. To provide excellent care visitors will be limited to one in the room at any given time. 17.  Please bring picture ID and insurance card. 18.  Visit our web site for additional information:  [email or web address].

## 2022-10-31 DIAGNOSIS — E03.9 HYPOTHYROIDISM, UNSPECIFIED TYPE: Primary | ICD-10-CM

## 2022-10-31 LAB — THYROGLOBULIN BY LC-MS/MS, SERUM/PLASMA: 58.1 NG/ML (ref 1.3–31.8)

## 2022-11-01 ENCOUNTER — ANESTHESIA EVENT (OUTPATIENT)
Dept: OPERATING ROOM | Age: 57
End: 2022-11-01
Payer: COMMERCIAL

## 2022-11-02 ENCOUNTER — ANESTHESIA (OUTPATIENT)
Dept: OPERATING ROOM | Age: 57
End: 2022-11-02
Payer: COMMERCIAL

## 2022-11-02 ENCOUNTER — HOSPITAL ENCOUNTER (OUTPATIENT)
Age: 57
Setting detail: OUTPATIENT SURGERY
Discharge: HOME | End: 2022-11-02
Attending: ORTHOPAEDIC SURGERY | Admitting: ORTHOPAEDIC SURGERY
Payer: COMMERCIAL

## 2022-11-02 VITALS
DIASTOLIC BLOOD PRESSURE: 85 MMHG | OXYGEN SATURATION: 93 % | RESPIRATION RATE: 13 BRPM | HEART RATE: 68 BPM | HEIGHT: 64 IN | SYSTOLIC BLOOD PRESSURE: 118 MMHG | BODY MASS INDEX: 29.88 KG/M2 | TEMPERATURE: 97 F | WEIGHT: 175 LBS

## 2022-11-02 DIAGNOSIS — R22.42 MASS OF LEFT THIGH: Primary | ICD-10-CM

## 2022-11-02 DIAGNOSIS — D17.24 BENIGN LIPOMATOUS NEOPLASM OF SKIN AND SUBCUTANEOUS TISSUE OF LEFT LEG: ICD-10-CM

## 2022-11-02 PROCEDURE — 3700000000 HC ANESTHESIA ATTENDED CARE: Performed by: ORTHOPAEDIC SURGERY

## 2022-11-02 PROCEDURE — 2709999900 HC NON-CHARGEABLE SUPPLY: Performed by: ORTHOPAEDIC SURGERY

## 2022-11-02 PROCEDURE — 6360000002 HC RX W HCPCS: Performed by: ORTHOPAEDIC SURGERY

## 2022-11-02 PROCEDURE — 3600000004 HC SURGERY LEVEL 4 BASE: Performed by: ORTHOPAEDIC SURGERY

## 2022-11-02 PROCEDURE — 3700000001 HC ADD 15 MINUTES (ANESTHESIA): Performed by: ORTHOPAEDIC SURGERY

## 2022-11-02 PROCEDURE — 6360000002 HC RX W HCPCS

## 2022-11-02 PROCEDURE — 7100000001 HC PACU RECOVERY - ADDTL 15 MIN: Performed by: ORTHOPAEDIC SURGERY

## 2022-11-02 PROCEDURE — 6360000002 HC RX W HCPCS: Performed by: NURSE ANESTHETIST, CERTIFIED REGISTERED

## 2022-11-02 PROCEDURE — 3600000014 HC SURGERY LEVEL 4 ADDTL 15MIN: Performed by: ORTHOPAEDIC SURGERY

## 2022-11-02 PROCEDURE — 6360000002 HC RX W HCPCS: Performed by: ANESTHESIOLOGY

## 2022-11-02 PROCEDURE — 7100000000 HC PACU RECOVERY - FIRST 15 MIN: Performed by: ORTHOPAEDIC SURGERY

## 2022-11-02 PROCEDURE — 88304 TISSUE EXAM BY PATHOLOGIST: CPT

## 2022-11-02 PROCEDURE — 2580000003 HC RX 258: Performed by: ANESTHESIOLOGY

## 2022-11-02 PROCEDURE — 7100000010 HC PHASE II RECOVERY - FIRST 15 MIN: Performed by: ORTHOPAEDIC SURGERY

## 2022-11-02 PROCEDURE — 2500000003 HC RX 250 WO HCPCS: Performed by: NURSE ANESTHETIST, CERTIFIED REGISTERED

## 2022-11-02 PROCEDURE — 7100000011 HC PHASE II RECOVERY - ADDTL 15 MIN: Performed by: ORTHOPAEDIC SURGERY

## 2022-11-02 PROCEDURE — 6370000000 HC RX 637 (ALT 250 FOR IP): Performed by: ANESTHESIOLOGY

## 2022-11-02 RX ORDER — SODIUM CHLORIDE 9 MG/ML
INJECTION, SOLUTION INTRAVENOUS PRN
Status: DISCONTINUED | OUTPATIENT
Start: 2022-11-02 | End: 2022-11-02 | Stop reason: HOSPADM

## 2022-11-02 RX ORDER — ONDANSETRON 2 MG/ML
4 INJECTION INTRAMUSCULAR; INTRAVENOUS
Status: DISCONTINUED | OUTPATIENT
Start: 2022-11-02 | End: 2022-11-02 | Stop reason: HOSPADM

## 2022-11-02 RX ORDER — SODIUM CHLORIDE 0.9 % (FLUSH) 0.9 %
5-40 SYRINGE (ML) INJECTION PRN
Status: DISCONTINUED | OUTPATIENT
Start: 2022-11-02 | End: 2022-11-02 | Stop reason: HOSPADM

## 2022-11-02 RX ORDER — HYDROCODONE BITARTRATE AND ACETAMINOPHEN 5; 325 MG/1; MG/1
1 TABLET ORAL EVERY 8 HOURS PRN
Qty: 6 TABLET | Refills: 0 | Status: SHIPPED | OUTPATIENT
Start: 2022-11-02 | End: 2022-11-04

## 2022-11-02 RX ORDER — LIDOCAINE HYDROCHLORIDE 20 MG/ML
INJECTION, SOLUTION INFILTRATION; PERINEURAL PRN
Status: DISCONTINUED | OUTPATIENT
Start: 2022-11-02 | End: 2022-11-02 | Stop reason: SDUPTHER

## 2022-11-02 RX ORDER — FENTANYL CITRATE 50 UG/ML
INJECTION, SOLUTION INTRAMUSCULAR; INTRAVENOUS PRN
Status: DISCONTINUED | OUTPATIENT
Start: 2022-11-02 | End: 2022-11-02 | Stop reason: SDUPTHER

## 2022-11-02 RX ORDER — DIPHENHYDRAMINE HYDROCHLORIDE 50 MG/ML
12.5 INJECTION INTRAMUSCULAR; INTRAVENOUS
Status: DISCONTINUED | OUTPATIENT
Start: 2022-11-02 | End: 2022-11-02 | Stop reason: HOSPADM

## 2022-11-02 RX ORDER — SODIUM CHLORIDE 0.9 % (FLUSH) 0.9 %
5-40 SYRINGE (ML) INJECTION EVERY 12 HOURS SCHEDULED
Status: DISCONTINUED | OUTPATIENT
Start: 2022-11-02 | End: 2022-11-02 | Stop reason: HOSPADM

## 2022-11-02 RX ORDER — SODIUM CHLORIDE, SODIUM LACTATE, POTASSIUM CHLORIDE, CALCIUM CHLORIDE 600; 310; 30; 20 MG/100ML; MG/100ML; MG/100ML; MG/100ML
INJECTION, SOLUTION INTRAVENOUS CONTINUOUS
Status: DISCONTINUED | OUTPATIENT
Start: 2022-11-02 | End: 2022-11-02 | Stop reason: HOSPADM

## 2022-11-02 RX ORDER — MEPERIDINE HYDROCHLORIDE 50 MG/ML
12.5 INJECTION INTRAMUSCULAR; INTRAVENOUS; SUBCUTANEOUS EVERY 5 MIN PRN
Status: DISCONTINUED | OUTPATIENT
Start: 2022-11-02 | End: 2022-11-02 | Stop reason: HOSPADM

## 2022-11-02 RX ORDER — MIDAZOLAM HYDROCHLORIDE 1 MG/ML
INJECTION INTRAMUSCULAR; INTRAVENOUS PRN
Status: DISCONTINUED | OUTPATIENT
Start: 2022-11-02 | End: 2022-11-02 | Stop reason: SDUPTHER

## 2022-11-02 RX ORDER — LIDOCAINE HYDROCHLORIDE 10 MG/ML
1 INJECTION, SOLUTION EPIDURAL; INFILTRATION; INTRACAUDAL; PERINEURAL
Status: DISCONTINUED | OUTPATIENT
Start: 2022-11-02 | End: 2022-11-02 | Stop reason: HOSPADM

## 2022-11-02 RX ORDER — DEXAMETHASONE SODIUM PHOSPHATE 4 MG/ML
INJECTION, SOLUTION INTRA-ARTICULAR; INTRALESIONAL; INTRAMUSCULAR; INTRAVENOUS; SOFT TISSUE PRN
Status: DISCONTINUED | OUTPATIENT
Start: 2022-11-02 | End: 2022-11-02 | Stop reason: SDUPTHER

## 2022-11-02 RX ORDER — PROPOFOL 10 MG/ML
INJECTION, EMULSION INTRAVENOUS PRN
Status: DISCONTINUED | OUTPATIENT
Start: 2022-11-02 | End: 2022-11-02 | Stop reason: SDUPTHER

## 2022-11-02 RX ORDER — LABETALOL HYDROCHLORIDE 5 MG/ML
10 INJECTION, SOLUTION INTRAVENOUS
Status: DISCONTINUED | OUTPATIENT
Start: 2022-11-02 | End: 2022-11-02 | Stop reason: HOSPADM

## 2022-11-02 RX ORDER — OXYCODONE HYDROCHLORIDE 5 MG/1
5 TABLET ORAL PRN
Status: COMPLETED | OUTPATIENT
Start: 2022-11-02 | End: 2022-11-02

## 2022-11-02 RX ORDER — SODIUM CHLORIDE 9 MG/ML
25 INJECTION, SOLUTION INTRAVENOUS PRN
Status: DISCONTINUED | OUTPATIENT
Start: 2022-11-02 | End: 2022-11-02 | Stop reason: HOSPADM

## 2022-11-02 RX ORDER — ONDANSETRON 2 MG/ML
INJECTION INTRAMUSCULAR; INTRAVENOUS PRN
Status: DISCONTINUED | OUTPATIENT
Start: 2022-11-02 | End: 2022-11-02 | Stop reason: SDUPTHER

## 2022-11-02 RX ORDER — OXYCODONE HYDROCHLORIDE 5 MG/1
10 TABLET ORAL PRN
Status: COMPLETED | OUTPATIENT
Start: 2022-11-02 | End: 2022-11-02

## 2022-11-02 RX ADMIN — FENTANYL CITRATE 50 MCG: 50 INJECTION INTRAMUSCULAR; INTRAVENOUS at 09:24

## 2022-11-02 RX ADMIN — OXYCODONE 10 MG: 5 TABLET ORAL at 11:15

## 2022-11-02 RX ADMIN — ONDANSETRON 4 MG: 2 INJECTION INTRAMUSCULAR; INTRAVENOUS at 09:24

## 2022-11-02 RX ADMIN — HYDROMORPHONE HYDROCHLORIDE 0.5 MG: 1 INJECTION, SOLUTION INTRAMUSCULAR; INTRAVENOUS; SUBCUTANEOUS at 10:08

## 2022-11-02 RX ADMIN — HYDROMORPHONE HYDROCHLORIDE 0.5 MG: 1 INJECTION, SOLUTION INTRAMUSCULAR; INTRAVENOUS; SUBCUTANEOUS at 10:21

## 2022-11-02 RX ADMIN — DEXAMETHASONE SODIUM PHOSPHATE 4 MG: 4 INJECTION, SOLUTION INTRAMUSCULAR; INTRAVENOUS at 09:24

## 2022-11-02 RX ADMIN — MIDAZOLAM HYDROCHLORIDE 2 MG: 2 INJECTION, SOLUTION INTRAMUSCULAR; INTRAVENOUS at 09:11

## 2022-11-02 RX ADMIN — FENTANYL CITRATE 50 MCG: 50 INJECTION INTRAMUSCULAR; INTRAVENOUS at 09:20

## 2022-11-02 RX ADMIN — LIDOCAINE HYDROCHLORIDE 60 MG: 20 INJECTION, SOLUTION INFILTRATION; PERINEURAL at 09:16

## 2022-11-02 RX ADMIN — PROPOFOL 160 MG: 10 INJECTION, EMULSION INTRAVENOUS at 09:16

## 2022-11-02 RX ADMIN — SODIUM CHLORIDE, SODIUM LACTATE, POTASSIUM CHLORIDE, AND CALCIUM CHLORIDE: .6; .31; .03; .02 INJECTION, SOLUTION INTRAVENOUS at 09:11

## 2022-11-02 RX ADMIN — CEFAZOLIN 2000 MG: 10 INJECTION, POWDER, FOR SOLUTION INTRAVENOUS at 09:12

## 2022-11-02 ASSESSMENT — PAIN SCALES - GENERAL
PAINLEVEL_OUTOF10: 8
PAINLEVEL_OUTOF10: 0
PAINLEVEL_OUTOF10: 8
PAINLEVEL_OUTOF10: 7

## 2022-11-02 NOTE — BRIEF OP NOTE
Brief Postoperative Note      Patient: Margaret Guerra  YOB: 1965  MRN: [id number]    Date of Procedure: 11/2/2022    Pre-Op Diagnosis: LEFT HIP LIPOMA    Post-Op Diagnosis: Same       Procedure(s):  EXCISION LEFT HIP LIPOMA    Surgeon(s):  Lida Grimes MD    Assistant:  Surgical Assistant: Sarah Irene    Anesthesia: General    Estimated Blood Loss (mL): Minimal    Complications: None    Specimens:   * No specimens in log *    Implants:  * No implants in log *      Drains: * No LDAs found *    Findings: none    Electronically signed by Lida Grimes MD on 11/2/2022 at 9:36 AM

## 2022-11-02 NOTE — OP NOTE
Operative Note      Patient: Norah Ramires  YOB: 1965  MRN: [id number]    Date of Procedure: 11/2/2022    Pre-Op Diagnosis: LEFT HIP LIPOMA    Post-Op Diagnosis: Same       Procedure(s):  EXCISION LEFT HIP LIPOMA    Surgeon(s):  Jacoby Valentin MD    Assistant:   Surgical Assistant: Carey Aden    Anesthesia: General    Estimated Blood Loss (mL): Minimal    Complications: None    Specimens:   ID Type Source Tests Collected by Time Destination   A : LEFT HIP MASS Tissue Tissue SURGICAL PATHOLOGY Jacoby Valentin MD 11/2/2022 3904        Implants:  * No implants in log *      Drains: * No LDAs found *    Findings: none    Detailed Description of Procedure: Indications for surgery: this is a pleasant female with a left hip lipoma. The lipoma is deep to the fascia measuring greater than 10 cm located within the proximal thigh musculature presents today for excision risks and benefits were discussed including permanent neurologic neurovascular injury infection DVT recurrence she consented    T0 procedure she was taken to the operating placed supine and with gentle seizure. At the left hip prepped draped you sterile fashion. Longitudinal incision was made over the mass down the skin tissue through the fascia through the tensor musculature mass identified as a brown fat tumor. It was removed in 1 piece and a marginal fashion. Masses sent to pathology. Hemostasis was obtained.   Wound was irrigated closed in layers so dressing was applied the patient was taken to recovery where she arrived in a stable condition    Electronically signed by Jacoby Valentin MD on 11/2/2022 at 11:31 AM

## 2022-11-02 NOTE — INTERVAL H&P NOTE
Update History & Physical    The patient's History and Physical of October 24, 2022 was reviewed with the patient and I examined the patient. There was no change. The surgical site was confirmed by the patient and me. Plan: The risks, benefits, expected outcome, and alternative to the recommended procedure have been discussed with the patient. Patient understands and wants to proceed with the procedure.      Electronically signed by Ursula Villaseñor MD on 11/2/2022 at 8:27 AM

## 2022-11-02 NOTE — PROGRESS NOTES
Patient okay for discharge per MD. Discharge instructions and script sent to preferred pharmacy. IV removed and site assessment clean, dry, and intact. No questions or concerns at this time. Transported by wheelchair to personal car.

## 2022-11-02 NOTE — ANESTHESIA PRE PROCEDURE
Department of Anesthesiology  Preprocedure Note       Name:  Katlin Marcial   Age:  59 y.o.  :  1965                                          MRN:  [id number]         Date:  2022      Surgeon: Sarah Monzon):  Eliseo Jimenez MD    Procedure: Procedure(s):  EXCISION LEFT HIP LIPOMA    Medications prior to admission:   Prior to Admission medications    Medication Sig Start Date End Date Taking?  Authorizing Provider   levothyroxine (SYNTHROID) 88 MCG tablet TAKE 1 TABLET BY MOUTH EVERY DAY 10/3/22   Yannick Johnson MD   estradiol (ESTRACE) 1 MG tablet Take 1 mg by mouth daily    Historical Provider, MD   Cholecalciferol (VITAMIN D3 PO) Take 1 capsule by mouth Daily    Historical Provider, MD       Current medications:    Current Facility-Administered Medications   Medication Dose Route Frequency Provider Last Rate Last Admin    ceFAZolin (ANCEF) 2000 mg in dextrose 5 % 100 mL IVPB  2,000 mg IntraVENous On Call to [address] [place] [address], MD        lidocaine PF 1 % injection 1 mL  1 mL IntraDERmal Once PRN Kerri Dickinson MD        lactated ringers infusion   IntraVENous Continuous Kerri Dickinson MD        sodium chloride flush 0.9 % injection 5-40 mL  5-40 mL IntraVENous 2 times per day Kerri Dickinson MD        sodium chloride flush 0.9 % injection 5-40 mL  5-40 mL IntraVENous PRN Kerri Dickinson MD        0.9 % sodium chloride infusion   IntraVENous PRN Kerri Dickinson MD           Allergies:  No Known Allergies    Problem List:    Patient Active Problem List   Diagnosis Code    Hypothyroidism E03.9    Foot pain M79.673    Pain in joint, hand M25.549    Open wound of finger with complication S08.207X    Foot sprain S93.609A    Hyperlipidemia E78.5    Lymphocytic thyroiditis E06.3    Tonsillar abscess J36       Past Medical History:        Diagnosis Date    History of palpitations     Hyperlipidemia     Hypothyroidism     Moderate mitral valve regurgitation     Rash     Thyroid disease     TMJ dysfunction     Wears contact lenses     has glasses       Past Surgical History:        Procedure Laterality Date    ABDOMEN SURGERY       SECTION      CHOLECYSTECTOMY, LAPAROSCOPIC  1/10/11    COLONOSCOPY  12    HERNIA REPAIR      HYSTERECTOMY (CERVIX STATUS UNKNOWN)      HYSTERECTOMY, TOTAL ABDOMINAL (CERVIX REMOVED)      HYSTERECTOMY, VAGINAL      US THYROID BIOPSY  2021    US THYROID BIOPSY 2021 [place] ULTRASOUND       Social History:    Social History     Tobacco Use    Smoking status: Former     Packs/day: 1.00     Years: 15.00     Pack years: 15.00     Types: Cigarettes     Quit date: 1991     Years since quittin.8    Smokeless tobacco: Never   Substance Use Topics    Alcohol use: No                                Counseling given: Not Answered      Vital Signs (Current):   Vitals:    22 0836   BP: (!) 158/93   Pulse: 71   Resp: 18   Temp: 97.9 °F (36.6 °C)   TempSrc: Temporal   SpO2: 94%   Weight: 175 lb (79.4 kg)   Height: 5' 4\" (1.626 m)                                              BP Readings from Last 3 Encounters:   22 (!) 158/93   10/24/22 122/86   06/15/22 138/87       NPO Status: Time of last liquid consumption:                         Time of last solid consumption:                         Date of last liquid consumption: 22                        Date of last solid food consumption: 22    BMI:   Wt Readings from Last 3 Encounters:   22 175 lb (79.4 kg)   10/24/22 177 lb (80.3 kg)   10/11/22 165 lb (74.8 kg)     Body mass index is 30.04 kg/m².     CBC:   Lab Results   Component Value Date/Time    WBC 4.2 10/25/2022 10:03 AM    RBC 4.24 10/25/2022 10:03 AM    HGB 13.4 10/25/2022 10:03 AM    HCT 38.7 10/25/2022 10:03 AM    MCV 91.3 10/25/2022 10:03 AM    RDW 12.5 10/25/2022 10:03 AM     10/25/2022 10:03 AM       CMP:   Lab Results   Component Value Date/Time  10/25/2022 10:03 AM    K 3.9 10/25/2022 10:03 AM    K 3.7 03/15/2021 03:59 PM     10/25/2022 10:03 AM    CO2 26 10/25/2022 10:03 AM    BUN 11 10/25/2022 10:03 AM    CREATININE <0.5 10/25/2022 10:03 AM    GFRAA >60 05/24/2022 09:26 AM    GFRAA >60 01/10/2011 06:38 AM    AGRATIO 1.7 05/24/2022 09:26 AM    LABGLOM >60 10/25/2022 10:03 AM    GLUCOSE 90 10/25/2022 10:03 AM    PROT 6.8 05/24/2022 09:26 AM    PROT 6.8 01/10/2011 06:38 AM    CALCIUM 9.3 10/25/2022 10:03 AM    BILITOT 0.3 05/24/2022 09:26 AM    ALKPHOS 69 05/24/2022 09:26 AM    AST 10 05/24/2022 09:26 AM    ALT 7 05/24/2022 09:26 AM       POC Tests: No results for input(s): POCGLU, POCNA, POCK, POCCL, POCBUN, POCHEMO, POCHCT in the last 72 hours. Coags:   Lab Results   Component Value Date/Time    PROTIME 12.6 10/25/2022 10:03 AM    INR 0.95 10/25/2022 10:03 AM    APTT 29.0 10/25/2022 10:03 AM       HCG (If Applicable): No results found for: PREGTESTUR, PREGSERUM, HCG, HCGQUANT     ABGs: No results found for: PHART, PO2ART, SER7PRH, QFD6XUR, BEART, S4XGWELY     Type & Screen (If Applicable):  No results found for: LABABO, LABRH    Drug/Infectious Status (If Applicable):  No results found for: HIV, HEPCAB    COVID-19 Screening (If Applicable):   Lab Results   Component Value Date/Time    COVID19 DETECTED 03/15/2021 03:58 PM           Anesthesia Evaluation   no history of anesthetic complications:   Airway: Mallampati: II  TM distance: >3 FB   Neck ROM: full  Mouth opening: > = 3 FB   Dental: normal exam         Pulmonary:                             ROS comment: H/O TOB   Cardiovascular:    (+) hyperlipidemia                  Neuro/Psych:   Negative Neuro/Psych ROS              GI/Hepatic/Renal: Neg GI/Hepatic/Renal ROS            Endo/Other:    (+) hypothyroidism::., .                 Abdominal:             Vascular: negative vascular ROS.          Other Findings:           Anesthesia Plan      general     ASA 2     (Risks, benefits and alternatives of GA discussed with pt. Questions answered. Willing to proceed.)  Induction: intravenous. Anesthetic plan and risks discussed with patient.                         Aviva Sands MD   11/2/2022

## 2023-05-11 NOTE — RESULT ENCOUNTER NOTE
Ok to refer to Dr Nabil West or Dr Mary Carmen Sue PAST MEDICAL HISTORY:  Alcohol abuse     Atrial fibrillation     Diabetes     HTN (hypertension)     Non-ischemic cardiomyopathy     Pancreatitis

## 2023-05-23 ENCOUNTER — OFFICE VISIT (OUTPATIENT)
Dept: ENDOCRINOLOGY | Age: 58
End: 2023-05-23
Payer: COMMERCIAL

## 2023-05-23 VITALS
SYSTOLIC BLOOD PRESSURE: 134 MMHG | RESPIRATION RATE: 14 BRPM | TEMPERATURE: 98 F | HEIGHT: 64 IN | BODY MASS INDEX: 29.41 KG/M2 | OXYGEN SATURATION: 97 % | WEIGHT: 172.3 LBS | HEART RATE: 96 BPM | DIASTOLIC BLOOD PRESSURE: 81 MMHG

## 2023-05-23 DIAGNOSIS — E04.1 THYROID NODULE: Primary | ICD-10-CM

## 2023-05-23 DIAGNOSIS — E03.9 ACQUIRED HYPOTHYROIDISM: ICD-10-CM

## 2023-05-23 PROCEDURE — 99204 OFFICE O/P NEW MOD 45 MIN: CPT | Performed by: INTERNAL MEDICINE

## 2023-05-23 NOTE — PROGRESS NOTES
Subjective:      58 y/o WF who is here for thyroid evaluation. Referred by Dr. Miya Rodriguez for elevated Tg    She has a h/o hypothyroidism for year 20 years    Currently on levothyroxine  88mcg daily    Steady for years now    Labs:    10/22 TSH 2.39   Tg  58.1 H  Anti Tg -ve      Current complaints: see HPI    Mild controlled    No worsening factors    History of radiation to patient's neck:   no  Family history includes none  Family history of thyroid cancer:  none    She has a h/o thyroid nodule     USG    Nodule 1:     Size: 3.5 x 1.9 x 1.7 cm     Location: Mid left thyroid    Dominant nodule noticed in  benign FNA    USG 2014: IMPRESSION:       No significant change in the appearance of the thyroid gland since   2011 with diffuse thyroid parenchymal disease resulting   in enlargement and heterogeneity with micronodularity. Mild   hypervascularity suggests thyroiditis.       Past Medical History:   Diagnosis Date    History of palpitations     Hyperlipidemia     Hypothyroidism     Moderate mitral valve regurgitation     Rash     Thyroid disease     TMJ dysfunction     Wears contact lenses     has glasses     Past Surgical History:   Procedure Laterality Date    ABDOMEN SURGERY       SECTION      CHOLECYSTECTOMY, LAPAROSCOPIC  1/10/11    COLONOSCOPY  12    HERNIA REPAIR      HIP SURGERY Left 2022    EXCISION LEFT HIP LIPOMA performed by Ervin Lackey MD at [facility] (CERVIX STATUS UNKNOWN)      HYSTERECTOMY, TOTAL ABDOMINAL (CERVIX REMOVED)      HYSTERECTOMY, VAGINAL      US THYROID BIOPSY  2021    US THYROID BIOPSY 2021 [facility] ULTRASOUND     Current Outpatient Medications   Medication Sig Dispense Refill    levothyroxine (SYNTHROID) 88 MCG tablet TAKE 1 TABLET BY MOUTH EVERY DAY 90 tablet 3    estradiol (ESTRACE) 1 MG tablet Take 1 mg by mouth daily      Cholecalciferol (VITAMIN D3 PO) Take 1 capsule by mouth Daily       No current

## 2023-06-07 NOTE — ED PROVIDER NOTES
I independently performed a history and physical on Alex Go. All diagnostic, treatment, and disposition decisions were made by myself in conjunction with the advanced practice provider. For further details of Ivana MANNING [facility] emergency department encounter, please see Angel Sena NP's note for full details of patient's visit. Patient presents to the emergency department complaining of sore throat. She states that she has had increasing sore throat over the last week. She was seen and evaluated by PCP and a negative strep screen. In spite of this she had ongoing worsening nose and placed on steroids and doxycycline. Patient reports that her throat was so painful that she is having difficulty swallowing at this time. No fevers, chills, or sweats. Physical exam: Gen.: No acute distress. HEENT: Posterior oropharynx: Uvula stable. No erythema or exudate. Diffuse tenderness over the left submandibular region and extending into the neck area no overlying erythema. Head: Normocephalic/atraumatic. Heart: Regular rate and rhythm. Normal S1-S2. Lungs were clear to auscultation bilaterally. Xr Neck Soft Tissue    Result Date: 12/8/2018  EXAMINATION: TWO XRAY VIEWS OF THE NECK SOFT TISSUES 12/8/2018 8:27 am COMPARISON: None. HISTORY: ORDERING SYSTEM PROVIDED HISTORY: left sided throat pain TECHNOLOGIST PROVIDED HISTORY: Reason for exam:->left sided throat pain Ordering Physician Provided Reason for Exam: left sided throat pain Acuity: Acute Type of Exam: Initial FINDINGS: The cervical airway is patent. The epiglottis and aryepiglottic folds are normal.  The osseous structures and prevertebral soft tissues are within normal limits. Negative soft tissue neck.      Ct Soft Tissue Neck W Contrast    Result Date: 12/8/2018  EXAMINATION: CT OF THE NECK SOFT TISSUE WITH CONTRAST  12/8/2018 TECHNIQUE: CT of the neck was performed with the administration of intravenous contrast. Multiplanar reformatted images are provided for review. Dose modulation, iterative reconstruction, and/or weight based adjustment of the mA/kV was utilized to reduce the radiation dose to as low as reasonably achievable. COMPARISON: None. HISTORY: ORDERING SYSTEM PROVIDED HISTORY: left sided throat pain TECHNOLOGIST PROVIDED HISTORY: Ordering Physician Provided Reason for Exam: pt c/o sore throat x 5 days with no improvement Acuity: Acute Type of Exam: Initial Additional signs and symptoms: more sore on left side,swollen and hard to swallow FINDINGS: Metallic artifact from prior dental procedures limits visualization of peritonsillar location. There is fullness of soft tissue seen along the left aspect of the oropharynx extending caudally to the level of left para form sinus. There is also prominence of visualized left palatine tonsil. No discrete enhancing mass associated with mucosa. No loculated fluid collections to suggest abscess. There are prominent anterior cervical chain lymph nodes bilaterally notable at right jugulodigastric location measuring up to 13 mm in long axis and at level of the left jugulodigastric location measuring up to 15 mm in long axis. Floor of the mouth appears unremarkable. Submandibular glands are unremarkable. There is a heterogeneous enhancing mass associated with left lobe of the thyroid gland measuring 2.7 x 1.7 cm.     1.  Metallic artifact from prior dental procedures limits visualization of peritonsillar location bilaterally. However, there is fullness of soft tissue extending from level of left oropharynx inferiorly to level of left piriform sinus. Left palatine tonsil is not optimally visualized however there does appear to be heterogeneous enhancement at this location related to tonsillitis or phlegmon and developing abscess. Neoplasm cannot be entirely excluded. Follow-up could be helpful to assure resolution.  2.  Heterogeneous enhancing mass associated with the left lobe of Topical Sulfur Applications Counseling: Topical Sulfur Counseling: Patient counseled that this medication may cause skin irritation or allergic reactions.  In the event of skin irritation, the patient was advised to reduce the amount of the drug applied or use it less frequently.   The patient verbalized understanding of the proper use and possible adverse effects of topical sulfur application.  All of the patient's questions and concerns were addressed.

## 2023-07-17 ENCOUNTER — TELEPHONE (OUTPATIENT)
Dept: FAMILY MEDICINE CLINIC | Age: 58
End: 2023-07-17

## 2023-07-17 DIAGNOSIS — E03.9 HYPOTHYROIDISM, UNSPECIFIED TYPE: Primary | ICD-10-CM

## 2023-07-17 DIAGNOSIS — E78.5 HYPERLIPIDEMIA, UNSPECIFIED HYPERLIPIDEMIA TYPE: ICD-10-CM

## 2023-07-17 NOTE — TELEPHONE ENCOUNTER
----- Message from Marilyn Ashutosh sent at 7/17/2023  2:31 PM EDT -----  Subject: Referral Request    Reason for referral request? needs lab work prior to physical   Provider patient wants to be referred to(if known):     Provider Phone Number(if known):     Additional Information for Provider? give her a call when orders are   active.  ---------------------------------------------------------------------------  --------------  Jose Francisco Sorrento Catherine    [phone]; OK to leave message on voicemail  ---------------------------------------------------------------------------  --------------

## 2023-07-18 ENCOUNTER — NURSE ONLY (OUTPATIENT)
Dept: FAMILY MEDICINE CLINIC | Age: 58
End: 2023-07-18
Payer: COMMERCIAL

## 2023-07-18 DIAGNOSIS — E03.9 HYPOTHYROIDISM, UNSPECIFIED TYPE: ICD-10-CM

## 2023-07-18 DIAGNOSIS — E78.5 HYPERLIPIDEMIA, UNSPECIFIED HYPERLIPIDEMIA TYPE: ICD-10-CM

## 2023-07-18 LAB
ALBUMIN SERPL-MCNC: 4.1 G/DL (ref 3.4–5)
ALBUMIN/GLOB SERPL: 1.7 {RATIO} (ref 1.1–2.2)
ALP SERPL-CCNC: 75 U/L (ref 40–129)
ALT SERPL-CCNC: 8 U/L (ref 10–40)
ANION GAP SERPL CALCULATED.3IONS-SCNC: 11 MMOL/L (ref 3–16)
AST SERPL-CCNC: 10 U/L (ref 15–37)
BASOPHILS # BLD: 0.1 K/UL (ref 0–0.2)
BASOPHILS NFR BLD: 0.8 %
BILIRUB SERPL-MCNC: 0.3 MG/DL (ref 0–1)
BUN SERPL-MCNC: 13 MG/DL (ref 7–20)
CALCIUM SERPL-MCNC: 8.9 MG/DL (ref 8.3–10.6)
CHLORIDE SERPL-SCNC: 102 MMOL/L (ref 99–110)
CHOLEST SERPL-MCNC: 274 MG/DL (ref 0–199)
CO2 SERPL-SCNC: 26 MMOL/L (ref 21–32)
CREAT SERPL-MCNC: 0.6 MG/DL (ref 0.6–1.2)
DEPRECATED RDW RBC AUTO: 12.6 % (ref 12.4–15.4)
EOSINOPHIL # BLD: 0.1 K/UL (ref 0–0.6)
EOSINOPHIL NFR BLD: 2.1 %
GFR SERPLBLD CREATININE-BSD FMLA CKD-EPI: >60 ML/MIN/{1.73_M2}
GLUCOSE SERPL-MCNC: 83 MG/DL (ref 70–99)
HCT VFR BLD AUTO: 42.3 % (ref 36–48)
HDLC SERPL-MCNC: 40 MG/DL (ref 40–60)
HGB BLD-MCNC: 14.3 G/DL (ref 12–16)
LDLC SERPL CALC-MCNC: 179 MG/DL
LYMPHOCYTES # BLD: 2.9 K/UL (ref 1–5.1)
LYMPHOCYTES NFR BLD: 40.8 %
MCH RBC QN AUTO: 32 PG (ref 26–34)
MCHC RBC AUTO-ENTMCNC: 33.8 G/DL (ref 31–36)
MCV RBC AUTO: 94.7 FL (ref 80–100)
MONOCYTES # BLD: 0.5 K/UL (ref 0–1.3)
MONOCYTES NFR BLD: 6.9 %
NEUTROPHILS # BLD: 3.5 K/UL (ref 1.7–7.7)
NEUTROPHILS NFR BLD: 49.4 %
PLATELET # BLD AUTO: 184 K/UL (ref 135–450)
PMV BLD AUTO: 10.7 FL (ref 5–10.5)
POTASSIUM SERPL-SCNC: 3.9 MMOL/L (ref 3.5–5.1)
PROT SERPL-MCNC: 6.5 G/DL (ref 6.4–8.2)
RBC # BLD AUTO: 4.47 M/UL (ref 4–5.2)
SODIUM SERPL-SCNC: 139 MMOL/L (ref 136–145)
T4 FREE SERPL-MCNC: 1.6 NG/DL (ref 0.9–1.8)
TRIGL SERPL-MCNC: 277 MG/DL (ref 0–150)
TSH SERPL DL<=0.005 MIU/L-ACNC: 3.55 UIU/ML (ref 0.27–4.2)
VLDLC SERPL CALC-MCNC: 55 MG/DL
WBC # BLD AUTO: 7.1 K/UL (ref 4–11)

## 2023-07-18 PROCEDURE — 36415 COLL VENOUS BLD VENIPUNCTURE: CPT | Performed by: FAMILY MEDICINE

## 2023-07-19 ENCOUNTER — TELEPHONE (OUTPATIENT)
Dept: CARDIOLOGY CLINIC | Age: 58
End: 2023-07-19

## 2023-07-19 NOTE — TELEPHONE ENCOUNTER
PT called and stated she has abnormal lab results. Her cholesterol is 276; Pts PCP advised her to start cholesterol rx. Pt stated she wants to see [facility] before starting rx.  Pt scheduled yearly ov 09/08/23 [facility]

## 2023-07-19 NOTE — TELEPHONE ENCOUNTER
ALEKSEY [address] [place] [address] and spoke with patient she states she would like Dr Orion Lombardo to review Cholesterol labs and advise medication to take? She states she has taken Crestor in the past and had really bad palpitations.

## 2023-07-24 NOTE — TELEPHONE ENCOUNTER
Patient's lipids remain sub-optimally controlled. I documented that she did not tolerate statins in the past.  Can we please clarify which statins she has tried and what her reactions were? If she is unable to tolerate statin therapy, we can discuss possibly getting her approved for a PCSK9 inhibitor and/or starting ezetimibe. This can be discussed further at her upcoming appointment as well. Thanks.

## 2023-07-24 NOTE — TELEPHONE ENCOUNTER
Called and spoke with patient she states she will wait till office visit 8/1 to speak with Dr Shannan Camargo regarding cholesterol medication options as she states she has bad reactions to all mediations. Patients states Brandie in the past has given her really bad palpitations that kept her awake at night.

## 2023-07-26 ASSESSMENT — ENCOUNTER SYMPTOMS
EYE PAIN: 0
COUGH: 0
ABDOMINAL PAIN: 0
SORE THROAT: 0
RHINORRHEA: 0
BLOOD IN STOOL: 0
SHORTNESS OF BREATH: 0
CONSTIPATION: 0
DIARRHEA: 0
PHOTOPHOBIA: 0
VOMITING: 0
NAUSEA: 0

## 2023-07-26 NOTE — PROGRESS NOTES
[address]   Cardiovascular Evaluation    PATIENT: Gogo Pierce  DATE: 2023  MRN: [id number]  CSN: [id number]  : 1965      Primary Care Doctor: Lelia Encarnacion MD  Reason for evaluation:   Follow-up for hyperlipidemia, mitral regurgitation    Subjective:     Gogo Pierce is a 59 y.o. female with a history of hyperlipidemia, hypothyroidism, and mitral regurgitation who presents for follow-up. The patient was initially evaluated in clinic on 22 for palpitations and chest pain. Following her initial evaluation, she had a pharmacologic nuclear SPECT stress test performed on 22 that demonstrated normal myocardial perfusion. She also had a TTE performed at that time that showed an LVEF of 55-60% with normal wall motion, mild septal hypertrophy, normal LV diastolic filling pressure, normal RV size and systolic function, mild-moderately enlarged left atrium, moderate mitral regurgitation, mild-moderate tricuspid regurgitation, estimated SPAP of 30 mmHg, and a trivial pericardial effusion. She also wore a 2-week cardiac event monitor for 2 weeks which showed pre-dominant sinus rhythm with rare PACs, rare PVCs, and brief PSVT. Today, the patient reports that she has generally been feeling well. Her palpitations have been infrequent and she denies any recent chest pain, shortness of breath, lightheaded, or dizziness. She does note some occasional mild lower extremity edema, but does not currently have any significant swelling today. Her PCP recently ordered a repeat lipid panel that showed a total cholesterol of 274, triglycerides 277, HDL 40, and . She had taken rosuvastatin in the past, but this was discontinued because she felt like it was causing palpitations. She has not tried any other statins. She says that she has been working to improve her diet, but says that she has been struggling to motivate herself to exercise regularly.       Past Medical

## 2023-08-01 ENCOUNTER — OFFICE VISIT (OUTPATIENT)
Dept: CARDIOLOGY CLINIC | Age: 58
End: 2023-08-01
Payer: COMMERCIAL

## 2023-08-01 VITALS
BODY MASS INDEX: 30.56 KG/M2 | OXYGEN SATURATION: 96 % | HEART RATE: 85 BPM | HEIGHT: 64 IN | SYSTOLIC BLOOD PRESSURE: 144 MMHG | WEIGHT: 179 LBS | DIASTOLIC BLOOD PRESSURE: 93 MMHG

## 2023-08-01 DIAGNOSIS — E03.9 HYPOTHYROIDISM, UNSPECIFIED TYPE: ICD-10-CM

## 2023-08-01 DIAGNOSIS — R03.0 ELEVATED BP WITHOUT DIAGNOSIS OF HYPERTENSION: ICD-10-CM

## 2023-08-01 DIAGNOSIS — I34.0 MITRAL VALVE INSUFFICIENCY, UNSPECIFIED ETIOLOGY: ICD-10-CM

## 2023-08-01 DIAGNOSIS — R00.2 PALPITATIONS: ICD-10-CM

## 2023-08-01 DIAGNOSIS — R07.89 ATYPICAL CHEST PAIN: Primary | ICD-10-CM

## 2023-08-01 DIAGNOSIS — I49.1 PAC (PREMATURE ATRIAL CONTRACTION): ICD-10-CM

## 2023-08-01 DIAGNOSIS — Z82.49 FAMILY HISTORY OF CORONARY ARTERY DISEASE: ICD-10-CM

## 2023-08-01 DIAGNOSIS — E78.2 MIXED HYPERLIPIDEMIA: ICD-10-CM

## 2023-08-01 DIAGNOSIS — I49.3 PVC (PREMATURE VENTRICULAR CONTRACTION): ICD-10-CM

## 2023-08-01 PROCEDURE — 93000 ELECTROCARDIOGRAM COMPLETE: CPT | Performed by: INTERNAL MEDICINE

## 2023-08-01 PROCEDURE — 99214 OFFICE O/P EST MOD 30 MIN: CPT | Performed by: INTERNAL MEDICINE

## 2023-08-01 RX ORDER — ATORVASTATIN CALCIUM 20 MG/1
20 TABLET, FILM COATED ORAL DAILY
Qty: 90 TABLET | Refills: 1 | Status: SHIPPED | OUTPATIENT
Start: 2023-08-01

## 2023-08-01 NOTE — PATIENT INSTRUCTIONS
Repeat Echocardiogram to monitor for progression of mitral regurgitation. Call [phone] to schedule this test  START Atorvastatin 20 mg daily   Continue to encourage heart healthy, low sodium diet and regular physical exercise for at least 30 minutes a minimum of 3-5 times per week. Follow up with me in 6 months. Will repeat fasting lipid at this time.

## 2023-08-05 ASSESSMENT — PATIENT HEALTH QUESTIONNAIRE - PHQ9
SUM OF ALL RESPONSES TO PHQ QUESTIONS 1-9: 0
2. FEELING DOWN, DEPRESSED OR HOPELESS: NOT AT ALL
SUM OF ALL RESPONSES TO PHQ QUESTIONS 1-9: 0
SUM OF ALL RESPONSES TO PHQ9 QUESTIONS 1 & 2: 0
1. LITTLE INTEREST OR PLEASURE IN DOING THINGS: 0
2. FEELING DOWN, DEPRESSED OR HOPELESS: 0
SUM OF ALL RESPONSES TO PHQ QUESTIONS 1-9: 0
SUM OF ALL RESPONSES TO PHQ9 QUESTIONS 1 & 2: 0
1. LITTLE INTEREST OR PLEASURE IN DOING THINGS: NOT AT ALL
SUM OF ALL RESPONSES TO PHQ QUESTIONS 1-9: 0

## 2023-08-08 ENCOUNTER — OFFICE VISIT (OUTPATIENT)
Dept: FAMILY MEDICINE CLINIC | Age: 58
End: 2023-08-08
Payer: COMMERCIAL

## 2023-08-08 VITALS
WEIGHT: 179.4 LBS | SYSTOLIC BLOOD PRESSURE: 110 MMHG | HEIGHT: 64 IN | BODY MASS INDEX: 30.63 KG/M2 | HEART RATE: 92 BPM | DIASTOLIC BLOOD PRESSURE: 76 MMHG | OXYGEN SATURATION: 97 %

## 2023-08-08 DIAGNOSIS — E78.5 HYPERLIPIDEMIA, UNSPECIFIED HYPERLIPIDEMIA TYPE: ICD-10-CM

## 2023-08-08 DIAGNOSIS — F43.10 PTSD (POST-TRAUMATIC STRESS DISORDER): ICD-10-CM

## 2023-08-08 DIAGNOSIS — E03.9 HYPOTHYROIDISM, UNSPECIFIED TYPE: Primary | ICD-10-CM

## 2023-08-08 DIAGNOSIS — R35.0 URINARY FREQUENCY: ICD-10-CM

## 2023-08-08 DIAGNOSIS — Z12.31 ENCOUNTER FOR SCREENING MAMMOGRAM FOR BREAST CANCER: ICD-10-CM

## 2023-08-08 DIAGNOSIS — R00.2 PALPITATIONS: ICD-10-CM

## 2023-08-08 LAB
BILIRUBIN, POC: NEGATIVE
BLOOD URINE, POC: NORMAL
CLARITY, POC: CLEAR
COLOR, POC: YELLOW
GLUCOSE URINE, POC: NEGATIVE
KETONES, POC: NEGATIVE
LEUKOCYTE EST, POC: NEGATIVE
NITRITE, POC: NEGATIVE
PH, POC: 5.5
PROTEIN, POC: NEGATIVE
SPECIFIC GRAVITY, POC: 1.01
UROBILINOGEN, POC: 0.2

## 2023-08-08 PROCEDURE — 81002 URINALYSIS NONAUTO W/O SCOPE: CPT | Performed by: FAMILY MEDICINE

## 2023-08-08 PROCEDURE — 99214 OFFICE O/P EST MOD 30 MIN: CPT | Performed by: FAMILY MEDICINE

## 2023-08-08 ASSESSMENT — ENCOUNTER SYMPTOMS: SHORTNESS OF BREATH: 0

## 2023-08-08 NOTE — PROGRESS NOTES
motion. Right lower leg: Edema present. Left lower leg: Edema present. Skin:     General: Skin is warm and dry. Neurological:      Mental Status: She is alert and oriented to person, place, and time. Psychiatric:         Mood and Affect: Mood normal.            An electronic signature was used to authenticate this note.     --Roma Maldonado

## 2023-08-08 NOTE — RESULT ENCOUNTER NOTE
UA with trace blood, otherwise negative. Addressed with patient at the office visit.   Please send for culture

## 2023-08-08 NOTE — PROGRESS NOTES
Father     High Blood Pressure Mother     High Cholesterol Mother     Kidney Disease Mother         renal polycystic     Arthritis Mother     Early Death Paternal Aunt     Breast Cancer Sister     Endometrial Cancer Sister     Breast Cancer Maternal Grandmother        Social History     Socioeconomic History    Marital status:      Spouse name: Not on file    Number of children: Not on file    Years of education: Not on file    Highest education level: Not on file   Occupational History    Not on file   Tobacco Use    Smoking status: Former     Packs/day: 1.00     Years: 15.00     Pack years: 15.00     Types: Cigarettes     Quit date: 1991     Years since quittin.6    Smokeless tobacco: Never   Vaping Use    Vaping Use: Never used   Substance and Sexual Activity    Alcohol use: No    Drug use: No    Sexual activity: Not on file   Other Topics Concern    Not on file   Social History Narrative    Not on file     Social Determinants of Health     Financial Resource Strain: Not on file   Food Insecurity: Not on file   Transportation Needs: Not on file   Physical Activity: Not on file   Stress: Not on file   Social Connections: Not on file   Intimate Partner Violence: Not on file   Housing Stability: Not on file       Prior to Visit Medications    Medication Sig Taking?  Authorizing Provider   Glucosamine HCl (GLUCOSAMINE PO) Take by mouth Yes Historical Provider, MD   Probiotic Product (PROBIOTIC ADVANCED PO) Take by mouth Supplement with probiotic, kale, organic wheat grass, alphlfa, barley, Yes Historical Provider, MD   atorvastatin (LIPITOR) 20 MG tablet Take 1 tablet by mouth daily Yes Alvaro Matson DO   levothyroxine (SYNTHROID) 88 MCG tablet TAKE 1 TABLET BY MOUTH EVERY DAY Yes Drema Aschoff, MD   estradiol (ESTRACE) 1 MG tablet Take 1 tablet by mouth daily Yes Historical Provider, MD   Cholecalciferol (VITAMIN D3 PO) Take 1 capsule by mouth Daily Yes Historical Provider, MD       No Known

## 2023-08-09 LAB — BACTERIA UR CULT: NORMAL

## 2023-08-22 ENCOUNTER — NURSE ONLY (OUTPATIENT)
Dept: FAMILY MEDICINE CLINIC | Age: 58
End: 2023-08-22
Payer: COMMERCIAL

## 2023-08-22 DIAGNOSIS — R35.0 URINE FREQUENCY: Primary | ICD-10-CM

## 2023-08-22 LAB
BILIRUBIN, POC: NEGATIVE
BLOOD URINE, POC: NEGATIVE
CLARITY, POC: CLEAR
COLOR, POC: YELLOW
GLUCOSE URINE, POC: NEGATIVE
KETONES, POC: NEGATIVE
LEUKOCYTE EST, POC: NEGATIVE
NITRITE, POC: NEGATIVE
PH, POC: 7
PROTEIN, POC: NEGATIVE
SPECIFIC GRAVITY, POC: 1.02
UROBILINOGEN, POC: 0.2

## 2023-08-22 PROCEDURE — 81002 URINALYSIS NONAUTO W/O SCOPE: CPT | Performed by: FAMILY MEDICINE

## 2023-08-25 ENCOUNTER — HOSPITAL ENCOUNTER (OUTPATIENT)
Dept: CARDIOLOGY | Age: 58
Discharge: HOME | End: 2023-08-25
Payer: COMMERCIAL

## 2023-08-25 LAB
LV EF: 58 %
LVEF MODALITY: NORMAL

## 2023-08-25 PROCEDURE — 93306 TTE W/DOPPLER COMPLETE: CPT

## 2023-08-28 ENCOUNTER — TELEPHONE (OUTPATIENT)
Dept: CARDIOLOGY CLINIC | Age: 58
End: 2023-08-28

## 2023-08-28 ENCOUNTER — HOSPITAL ENCOUNTER (OUTPATIENT)
Dept: WOMENS IMAGING | Age: 58
Discharge: HOME | End: 2023-08-28
Payer: COMMERCIAL

## 2023-08-28 VITALS — HEIGHT: 64 IN | WEIGHT: 175 LBS | BODY MASS INDEX: 29.88 KG/M2

## 2023-08-28 DIAGNOSIS — Z12.31 ENCOUNTER FOR SCREENING MAMMOGRAM FOR BREAST CANCER: ICD-10-CM

## 2023-08-28 PROCEDURE — 77063 BREAST TOMOSYNTHESIS BI: CPT

## 2023-08-28 NOTE — TELEPHONE ENCOUNTER
Created telephone encounter. Spoke with Bryon Saleh relayed message per [facility] regarding echo. Pt verbalized understanding.

## 2023-08-28 NOTE — TELEPHONE ENCOUNTER
----- Message from Concetta Vallecillo DO sent at 8/25/2023  5:27 PM EDT -----  Please let patient know that her overall heart pumping function is normal.    Degree of mitral regurgitation remains stable. There was a very small amount of fluid noted around the heart that does not require additional treatment at this time.

## 2023-08-30 ENCOUNTER — HOSPITAL ENCOUNTER (OUTPATIENT)
Dept: PHYSICAL THERAPY | Age: 58
Setting detail: THERAPIES SERIES
Discharge: HOME | End: 2023-08-30
Payer: COMMERCIAL

## 2023-08-30 PROCEDURE — 97110 THERAPEUTIC EXERCISES: CPT

## 2023-08-30 PROCEDURE — 97140 MANUAL THERAPY 1/> REGIONS: CPT

## 2023-08-30 PROCEDURE — 97161 PT EVAL LOW COMPLEX 20 MIN: CPT

## 2023-08-30 NOTE — FLOWSHEET NOTE
[facility], [address] [place] [address]  Phone: [phone], [phone]    Physical Therapy Treatment Note/ Progress Report:     Date:  2023    Patient Name:  Michelle Marroquin    :  1965  MRN: [id number]  Restrictions/Precautions:    Medical/Treatment Diagnosis Information:  Diagnosis: L Hip Bursitis (M70.72)   Treatment Diagnosis: L Hip Pain (M25.552), Muscle Weakness (M62.81)               Insurance/Certification information:  PT Insurance Information: [employer]  Physician Information:  Referring Provider: Dr. Veronika Moon  Has the plan of care been signed (Y/N):        []  Yes  [x]  No     Date of Patient follow up with Physician:     Is this a Progress Report:     []  Yes  [x]  No      If Yes:  Date Range for reporting period:  Initial Eval: 2023  Beginnin2023 --- Endin2023    Progress report will be due (10 Rx or 30 days whichever is less):      Recertification will be due (POC Duration  / 90 days whichever is less): 2023      Visit # Insurance Allowable Auth Required   In Person Eval Auth after Evaluation   [x]  Yes     []  No    Tele Health -  []  Yes     []  No    Total Eval        Functional Test Score: LEFS :48 % (Total: 52/80)  Date assessed: 2023      Latex Allergy:  [x]NO      []YES    Preferred Language for Healthcare:   [x]English       []other:    Pain level:  Current: 3/10;  Worst 7/10     SUBJECTIVE:  See eval    OBJECTIVE: See eval  Observation:   Test measurements:      RESTRICTIONS/PRECAUTIONS: N/A    Exercises/Interventions:   Therapeutic Ex (99831)  Therapeutic Activity (37269)  NMR re-education (93688) Sets/Reps Notes/CUES   Bike          HL Piriformis Stretch 3 x 30\"    HL Figure 4 stretch 3 x 30\"         Standing Hip Flexor stretch 5 x 15\"    Standing IT Band Stretch 5 x 15\"         Supine Modified Valente Stretch 3 x 30\" With Manual Assist        SL Hip Flexor/ITBand stretch 3 x 30\" ea With Manual Assist

## 2023-09-06 ENCOUNTER — HOSPITAL ENCOUNTER (OUTPATIENT)
Dept: PHYSICAL THERAPY | Age: 58
Setting detail: THERAPIES SERIES
Discharge: HOME | End: 2023-09-06

## 2023-09-06 NOTE — FLOWSHEET NOTE
[address] [place] [address]    Physical Therapy  Cancellation/No-show Note  Patient Name:  Klaudia Moncada  :  1965   Date:  2023    Cancelled visits to date: 1  No-shows to date: 0    For today's appointment patient:  [x]  Cancelled  []  Rescheduled appointment  []  No-show     Reason given by patient:  []  Patient ill  [x]  Conflicting appointment  []  No transportation    []  Conflict with work  []  No reason given  []  Other:     Comments:      Phone call information:   []  Phone call made today to patient. []  Patient answered, conversation as follows:    []  Patient did not answer. [x]  Phone call not needed - pt contacted us to cancel and provided reason for cancellation.      Electronically signed by:  Desirae Champion PTA,

## 2023-09-12 ENCOUNTER — HOSPITAL ENCOUNTER (OUTPATIENT)
Dept: PHYSICAL THERAPY | Age: 58
Setting detail: THERAPIES SERIES
End: 2023-09-12
Payer: COMMERCIAL

## 2023-09-13 ENCOUNTER — HOSPITAL ENCOUNTER (OUTPATIENT)
Dept: PHYSICAL THERAPY | Age: 58
Setting detail: THERAPIES SERIES
Discharge: HOME | End: 2023-09-13
Payer: COMMERCIAL

## 2023-09-13 PROCEDURE — 97140 MANUAL THERAPY 1/> REGIONS: CPT

## 2023-09-13 PROCEDURE — 97110 THERAPEUTIC EXERCISES: CPT

## 2023-09-13 PROCEDURE — 97112 NEUROMUSCULAR REEDUCATION: CPT

## 2023-09-13 NOTE — FLOWSHEET NOTE
Test LEFS score will be </= 15% to assist with reaching prior level of function. [x] Progressing: [] Met: [] Not Met: [] Adjusted   2. Patient will demonstrate increased AROM to of L Hip = R Hip to allow for proper joint functioning as indicated by patients Functional Deficits. [x] Progressing: [] Met: [] Not Met: [] Adjusted   3. Patient will demonstrate an increase in Strength to L LE grossly = R LE grossly allow for proper functional mobility as indicated by patients Functional Deficits. [x] Progressing: [] Met: [] Not Met: [] Adjusted   4. Patient will return to functional hiking activities with NRPS of </= 1-2/10. At worst   [x] Progressing: [] Met: [] Not Met: [] Adjusted   5. Patient will be able to walk for > 15 mins without increased symptoms to be able to return to excise (patient specific functional goal)    [x] Progressing: [] Met: [] Not Met: [] Adjusted    Overall Progression Towards Functional goals/ Treatment Progress Update:  [x] Patient is progressing as expected towards functional goals listed. [] Progression is slowed due to complexities/Impairments listed. [] Progression has been slowed due to co-morbidities.   [] Plan just implemented, too soon to assess goals progression <30days   [] Goals require adjustment due to lack of progress  [] Patient is not progressing as expected and requires additional follow up with physician  [] Other    Prognosis for POC: [x] Good [] Fair  [] Poor    Patient requires continued skilled intervention: [x] Yes  [] No    Treatment/Activity Tolerance:  [x] Patient able to complete treatment  [] Patient limited by fatigue  [] Patient limited by pain    [] Patient limited by other medical complications  [] Other:     Return to Play: (if applicable)   []  Stage 1: Intro to Strength   []  Stage 2: Return to Run and Strength   []  Stage 3: Return to Jump and Strength   []  Stage 4: Dynamic Strength and Agility   []  Stage 5: Sport Specific Training     []  Ready

## 2023-09-15 ENCOUNTER — OFFICE VISIT (OUTPATIENT)
Dept: FAMILY MEDICINE CLINIC | Age: 58
End: 2023-09-15
Payer: COMMERCIAL

## 2023-09-15 VITALS
OXYGEN SATURATION: 94 % | BODY MASS INDEX: 30.38 KG/M2 | WEIGHT: 177 LBS | SYSTOLIC BLOOD PRESSURE: 144 MMHG | TEMPERATURE: 101.4 F | DIASTOLIC BLOOD PRESSURE: 100 MMHG | HEART RATE: 100 BPM

## 2023-09-15 DIAGNOSIS — R06.2 WHEEZING: ICD-10-CM

## 2023-09-15 DIAGNOSIS — J22 ACUTE RESPIRATORY INFECTION: Primary | ICD-10-CM

## 2023-09-15 PROCEDURE — 99214 OFFICE O/P EST MOD 30 MIN: CPT

## 2023-09-15 RX ORDER — CEFDINIR 300 MG/1
300 CAPSULE ORAL 2 TIMES DAILY
Qty: 20 CAPSULE | Refills: 0 | Status: SHIPPED | OUTPATIENT
Start: 2023-09-15 | End: 2023-09-25

## 2023-09-15 RX ORDER — METHYLPREDNISOLONE 4 MG/1
TABLET ORAL
Qty: 1 KIT | Refills: 0 | Status: SHIPPED | OUTPATIENT
Start: 2023-09-15 | End: 2023-09-21

## 2023-09-15 ASSESSMENT — ENCOUNTER SYMPTOMS
SINUS PAIN: 1
WHEEZING: 1
SHORTNESS OF BREATH: 1
SORE THROAT: 1
SINUS PRESSURE: 1
TROUBLE SWALLOWING: 0
GASTROINTESTINAL NEGATIVE: 1
COUGH: 1

## 2023-09-15 NOTE — PROGRESS NOTES
fever, body aches. Avoid NSAIDs while on Medrol pack. Interesting well-hydrated and getting adequate daily doses of vitamin C, D, zinc for immune support. Follow-up with office as needed  - XR CHEST STANDARD (2 VW); Future  - cefdinir (OMNICEF) 300 MG capsule; Take 1 capsule by mouth 2 times daily for 10 days  Dispense: 20 capsule; Refill: 0  - methylPREDNISolone (MEDROL DOSEPACK) 4 MG tablet; Take by mouth. Dispense: 1 kit; Refill: 0    2. Wheezing  See above #1  - methylPREDNISolone (MEDROL DOSEPACK) 4 MG tablet; Take by mouth. Dispense: 1 kit; Refill: 0    This document was prepared by a combination of typing and transcription through a voice recognition software. 30 min spent on pt care.    SUSAN Norris - CNP

## 2023-09-18 ENCOUNTER — TELEPHONE (OUTPATIENT)
Dept: FAMILY MEDICINE CLINIC | Age: 58
End: 2023-09-18

## 2023-09-18 ENCOUNTER — HOSPITAL ENCOUNTER (OUTPATIENT)
Dept: PHYSICAL THERAPY | Age: 58
Setting detail: THERAPIES SERIES
Discharge: HOME | End: 2023-09-18
Payer: COMMERCIAL

## 2023-09-18 DIAGNOSIS — J22 ACUTE RESPIRATORY INFECTION: Primary | ICD-10-CM

## 2023-09-18 PROCEDURE — 97112 NEUROMUSCULAR REEDUCATION: CPT | Performed by: PHYSICAL THERAPY ASSISTANT

## 2023-09-18 PROCEDURE — 97110 THERAPEUTIC EXERCISES: CPT | Performed by: PHYSICAL THERAPY ASSISTANT

## 2023-09-18 RX ORDER — BENZONATATE 200 MG/1
200 CAPSULE ORAL 3 TIMES DAILY PRN
Qty: 21 CAPSULE | Refills: 0 | Status: SHIPPED | OUTPATIENT
Start: 2023-09-18 | End: 2023-09-25

## 2023-09-18 RX ORDER — GUAIFENESIN 600 MG/1
600 TABLET, EXTENDED RELEASE ORAL 2 TIMES DAILY
Qty: 30 TABLET | Refills: 0 | Status: SHIPPED | OUTPATIENT
Start: 2023-09-18 | End: 2023-10-03

## 2023-09-18 NOTE — TELEPHONE ENCOUNTER
Sent Mucinex and Tessalon to her pharmacy on file. Mucinex to help thin the secretions. Tessalon can be taken if she wants to try and help suppress her cough. If takes Tessalon please consume 8 ounce glass of water when taking each time.

## 2023-09-18 NOTE — FLOWSHEET NOTE
balance, coordination, kinesthetic sense, posture, motor skill, proprioception to assist with LE, proximal hip, and core control in self-care, mobility, lifting, ambulation and eccentric single leg control. NMR and Therapeutic Activities:    [x] (83669 or 93459) Provided verbal/tactile cueing for activities related to improving balance, coordination, kinesthetic sense, posture, motor skill, proprioception and motor activation to allow for proper function of core, proximal hip and LE with self-care and ADLs and functional mobility. [x] (78705) Gait Re-education- Provided training and instruction to the patient for proper LE, core and proximal hip recruitment and positioning and eccentric body weight control with ambulation re-education including up and down stairs     Home Exercise Program:    [x] (94465) Reviewed/Progressed HEP activities related to strengthening, flexibility, endurance, ROM of core, proximal hip and LE for functional self-care, mobility, lifting and ambulation/stair navigation   [x] (71638) Reviewed/Progressed HEP activities related to improving balance, coordination, kinesthetic sense, posture, motor skill, proprioception of core, proximal hip and LE for self-care, mobility, lifting, and ambulation/stair navigation      Manual Treatments:  PROM / STM / Oscillations-Mobs:  G-I, II, III, IV (PA's, Inf., Post.)  [x] (02248) Provided manual therapy to mobilize LE, proximal hip and/or LS spine soft tissue/joints for the purpose of modulating pain, promoting relaxation, increasing ROM, reducing/eliminating soft tissue swelling/inflammation/restriction, improving soft tissue extensibility and allowing for proper ROM for normal function with self-care, mobility, lifting and ambulation. Modalities:     [x] GAME READY (VASO)- for significant edema, swelling, pain control. Vasopneumatic compression applied to - for significant edema, swelling, pain control.     ICD-10 code: R60.9 Edema

## 2023-09-19 ENCOUNTER — HOSPITAL ENCOUNTER (OUTPATIENT)
Dept: PHYSICAL THERAPY | Age: 58
Setting detail: THERAPIES SERIES
Discharge: HOME | End: 2023-09-19
Payer: COMMERCIAL

## 2023-09-19 PROCEDURE — 97112 NEUROMUSCULAR REEDUCATION: CPT

## 2023-09-19 PROCEDURE — 97110 THERAPEUTIC EXERCISES: CPT

## 2023-09-19 PROCEDURE — 97140 MANUAL THERAPY 1/> REGIONS: CPT

## 2023-09-19 NOTE — PROGRESS NOTES
functional goals listed. [] Progression is slowed due to complexities/Impairments listed. [] Progression has been slowed due to co-morbidities. [] Plan just implemented, too soon to assess goals progression <30days   [] Goals require adjustment due to lack of progress  [] Patient is not progressing as expected and requires additional follow up with physician  [] Other    Prognosis for POC: [x] Good [] Fair  [] Poor    Patient requires continued skilled intervention: [x] Yes  [] No    Treatment/Activity Tolerance:  [x] Patient able to complete treatment  [] Patient limited by fatigue  [] Patient limited by pain    [] Patient limited by other medical complications  [] Other:     Return to Play: (if applicable)   []  Stage 1: Intro to Strength   []  Stage 2: Return to Run and Strength   []  Stage 3: Return to Jump and Strength   []  Stage 4: Dynamic Strength and Agility   []  Stage 5: Sport Specific Training     []  Ready to Return to Play, Meets All Above Stages   []  Not Ready for Return to Sports   Comments:                         PLAN: See eval  [x] Continue per plan of care [] Alter current plan (see comments above)  [] Plan of care initiated [] Hold pending MD visit [] Discharge    Electronically signed by:  Vivi Livingston PT    Note: If patient does not return for scheduled/ recommended follow up visits, this note will serve as a discharge from care along with most recent update on progress.

## 2023-09-20 ENCOUNTER — APPOINTMENT (OUTPATIENT)
Dept: PHYSICAL THERAPY | Age: 58
End: 2023-09-20
Payer: COMMERCIAL

## 2023-10-03 ENCOUNTER — OFFICE VISIT (OUTPATIENT)
Dept: FAMILY MEDICINE CLINIC | Age: 58
End: 2023-10-03
Payer: COMMERCIAL

## 2023-10-03 VITALS
DIASTOLIC BLOOD PRESSURE: 88 MMHG | BODY MASS INDEX: 30.77 KG/M2 | OXYGEN SATURATION: 94 % | HEART RATE: 88 BPM | WEIGHT: 180.2 LBS | SYSTOLIC BLOOD PRESSURE: 130 MMHG | HEIGHT: 64 IN

## 2023-10-03 DIAGNOSIS — J40 BRONCHITIS: Primary | ICD-10-CM

## 2023-10-03 PROCEDURE — 99213 OFFICE O/P EST LOW 20 MIN: CPT | Performed by: FAMILY MEDICINE

## 2023-10-03 RX ORDER — PREDNISONE 10 MG/1
TABLET ORAL
Qty: 21 TABLET | Refills: 0 | Status: SHIPPED | OUTPATIENT
Start: 2023-10-03 | End: 2023-10-13

## 2023-10-03 RX ORDER — AZITHROMYCIN 250 MG/1
250 TABLET, FILM COATED ORAL SEE ADMIN INSTRUCTIONS
Qty: 6 TABLET | Refills: 0 | Status: SHIPPED | OUTPATIENT
Start: 2023-10-03 | End: 2023-10-08

## 2023-10-03 RX ORDER — ALBUTEROL SULFATE 90 UG/1
2 AEROSOL, METERED RESPIRATORY (INHALATION) EVERY 6 HOURS PRN
Qty: 18 G | Refills: 3 | Status: SHIPPED | OUTPATIENT
Start: 2023-10-03

## 2023-10-03 ASSESSMENT — ENCOUNTER SYMPTOMS
SINUS PRESSURE: 0
SINUS PAIN: 0
WHEEZING: 1
SHORTNESS OF BREATH: 1
COUGH: 1

## 2023-10-04 ENCOUNTER — HOSPITAL ENCOUNTER (OUTPATIENT)
Dept: PHYSICAL THERAPY | Age: 58
Setting detail: THERAPIES SERIES
Discharge: HOME | End: 2023-10-04

## 2023-10-04 NOTE — FLOWSHEET NOTE
[address] [place] [address]    Physical Therapy  Cancellation/No-show Note  Patient Name:  Johann Glass  :  1965   Date:  10/4/2023    Cancelled visits to date: 1  No-shows to date: 1    For today's appointment patient:  []  Cancelled  []  Rescheduled appointment  [x]  No-show     Reason given by patient:  []  Patient ill  []  Conflicting appointment  []  No transportation    []  Conflict with work  []  No reason given  [x]  Other:     Comments:  didn't realize she had an appt today    Phone call information:   [x]  Phone call made today to patient. [x]  Patient answered, conversation as follows: see above    []  Patient did not answer. []  Phone call not needed - pt contacted us to cancel and provided reason for cancellation.      Electronically signed by:  Sarah Ribeiro, PT,  MPT,ATC, cert DN

## 2023-10-10 ENCOUNTER — HOSPITAL ENCOUNTER (OUTPATIENT)
Dept: PHYSICAL THERAPY | Age: 58
Setting detail: THERAPIES SERIES
Discharge: HOME | End: 2023-10-10
Payer: COMMERCIAL

## 2023-10-10 PROCEDURE — 97110 THERAPEUTIC EXERCISES: CPT

## 2023-10-10 PROCEDURE — 97112 NEUROMUSCULAR REEDUCATION: CPT

## 2023-10-10 PROCEDURE — 97140 MANUAL THERAPY 1/> REGIONS: CPT

## 2023-10-10 NOTE — FLOWSHEET NOTE
indicated by Functional Deficits. [x] Progressing: [] Met: [] Not Met: [] Adjusted     Long Term Goals: To be achieved in: 12 weeks  Functional Scale Test LEFS score will be </= 15% to assist with reaching prior level of function. [x] Progressing: [] Met: [] Not Met: [] Adjusted   2. Patient will demonstrate increased AROM to of L Hip = R Hip to allow for proper joint functioning as indicated by patients Functional Deficits. [x] Progressing: [] Met: [] Not Met: [] Adjusted   3. Patient will demonstrate an increase in Strength to L LE grossly = R LE grossly allow for proper functional mobility as indicated by patients Functional Deficits. [x] Progressing: [] Met: [] Not Met: [] Adjusted   4. Patient will return to functional hiking activities with NRPS of </= 1-2/10. At worst   [x] Progressing: [] Met: [] Not Met: [] Adjusted   5. Patient will be able to walk for > 15 mins without increased symptoms to be able to return to excise (patient specific functional goal)    [x] Progressing: [] Met: [] Not Met: [] Adjusted    Overall Progression Towards Functional goals/ Treatment Progress Update:  [x] Patient is progressing as expected towards functional goals listed. [] Progression is slowed due to complexities/Impairments listed. [] Progression has been slowed due to co-morbidities.   [] Plan just implemented, too soon to assess goals progression <30days   [] Goals require adjustment due to lack of progress  [] Patient is not progressing as expected and requires additional follow up with physician  [] Other    Prognosis for POC: [x] Good [] Fair  [] Poor    Patient requires continued skilled intervention: [x] Yes  [] No    Treatment/Activity Tolerance:  [x] Patient able to complete treatment  [] Patient limited by fatigue  [] Patient limited by pain    [] Patient limited by other medical complications  [] Other:     Return to Play: (if applicable)   []  Stage 1: Intro to Strength   []  Stage 2: Return [address]

## 2023-10-11 RX ORDER — LEVOTHYROXINE SODIUM 88 UG/1
TABLET ORAL
Qty: 90 TABLET | Refills: 3 | Status: SHIPPED | OUTPATIENT
Start: 2023-10-11

## 2024-01-04 ENCOUNTER — TELEPHONE (OUTPATIENT)
Dept: FAMILY MEDICINE CLINIC | Age: 59
End: 2024-01-04

## 2024-01-04 RX ORDER — AZITHROMYCIN 1 G/1
1 POWDER, FOR SUSPENSION ORAL ONCE
Qty: 1 EACH | Refills: 0 | Status: SHIPPED | OUTPATIENT
Start: 2024-01-04 | End: 2024-01-04

## 2024-01-04 RX ORDER — BENZONATATE 200 MG/1
200 CAPSULE ORAL 3 TIMES DAILY PRN
Qty: 30 CAPSULE | Refills: 0 | Status: SHIPPED | OUTPATIENT
Start: 2024-01-04 | End: 2024-01-14

## 2024-01-04 RX ORDER — PREDNISONE 20 MG/1
40 TABLET ORAL DAILY
Qty: 10 TABLET | Refills: 0 | Status: SHIPPED | OUTPATIENT
Start: 2024-01-04 | End: 2024-01-09

## 2024-01-04 NOTE — TELEPHONE ENCOUNTER
Pt calling she has chest congestion, cough, chest tightness x 2 days. Pt took a home covid test and it was negative. Pt requesting something be sent to [facility] in [place].

## 2024-01-04 NOTE — TELEPHONE ENCOUNTER
Zpack, Tessalon 200mg po q8h prn with a full glass of water.  #30, no rf, prednisone 40mg po daily x 5d

## 2024-01-11 ENCOUNTER — TELEMEDICINE (OUTPATIENT)
Dept: FAMILY MEDICINE CLINIC | Age: 59
End: 2024-01-11
Payer: COMMERCIAL

## 2024-01-11 ENCOUNTER — TELEPHONE (OUTPATIENT)
Dept: FAMILY MEDICINE CLINIC | Age: 59
End: 2024-01-11

## 2024-01-11 DIAGNOSIS — J22 ACUTE RESPIRATORY INFECTION: Primary | ICD-10-CM

## 2024-01-11 PROCEDURE — 99213 OFFICE O/P EST LOW 20 MIN: CPT

## 2024-01-11 RX ORDER — CEFDINIR 300 MG/1
300 CAPSULE ORAL 2 TIMES DAILY
Qty: 20 CAPSULE | Refills: 0 | Status: SHIPPED | OUTPATIENT
Start: 2024-01-11 | End: 2024-01-21

## 2024-01-11 ASSESSMENT — PATIENT HEALTH QUESTIONNAIRE - PHQ9
SUM OF ALL RESPONSES TO PHQ9 QUESTIONS 1 & 2: 0
SUM OF ALL RESPONSES TO PHQ QUESTIONS 1-9: 0
SUM OF ALL RESPONSES TO PHQ QUESTIONS 1-9: 0
2. FEELING DOWN, DEPRESSED OR HOPELESS: 0
1. LITTLE INTEREST OR PLEASURE IN DOING THINGS: 0
SUM OF ALL RESPONSES TO PHQ QUESTIONS 1-9: 0
SUM OF ALL RESPONSES TO PHQ QUESTIONS 1-9: 0

## 2024-01-11 ASSESSMENT — ENCOUNTER SYMPTOMS
SINUS PAIN: 1
GASTROINTESTINAL NEGATIVE: 1
RHINORRHEA: 1
SINUS PRESSURE: 1
COUGH: 1

## 2024-01-11 NOTE — TELEPHONE ENCOUNTER
Pt called and said that she is still pretty congested and she is severely dizzy, she is wondering if there is anything she can do or take? Pt uses [facility]

## 2024-01-11 NOTE — PROGRESS NOTES
tablet by mouth daily  Alvaro Matson DO   estradiol (ESTRACE) 1 MG tablet Take 1 tablet by mouth daily  Ember Gibson MD   Cholecalciferol (VITAMIN D3 PO) Take 1 capsule by mouth Daily  ProviderEmber MD       Social History     Tobacco Use    Smoking status: Former     Current packs/day: 0.00     Average packs/day: 1 pack/day for 15.0 years (15.0 ttl pk-yrs)     Types: Cigarettes     Start date: 1976     Quit date: 1991     Years since quittin.0    Smokeless tobacco: Never   Vaping Use    Vaping Use: Never used   Substance Use Topics    Alcohol use: No    Drug use: No        No Known Allergies,   Past Medical History:   Diagnosis Date    GERD (gastroesophageal reflux disease) ?    History of palpitations     Hyperlipidemia     Hypothyroidism     Moderate mitral valve regurgitation     Rash     Thyroid disease     TMJ dysfunction     Wears contact lenses     has glasses       PHYSICAL EXAMINATION:  [ INSTRUCTIONS:  \"[x]\" Indicates a positive item  \"[]\" Indicates a negative item  -- DELETE ALL ITEMS NOT EXAMINED]  Vital Signs: (As obtained by patient/caregiver or practitioner observation)    Blood pressure-N/A heart rate-N/A   respiratory rate-N/A   temperature-N/A pulse oximetry-N/A    Constitutional: [x] Appears well-developed and well-nourished [x] No apparent distress      [] Abnormal-   Mental status  [x] Alert and awake  [x] Oriented to person/place/time [x]Able to follow commands      Eyes:  EOM    [x]  Normal  [] Abnormal-  Sclera  []  Normal  [] Abnormal -         Discharge [x]  None visible  [] Abnormal -    HENT:   [x] Normocephalic, atraumatic.  [] Abnormal   [x] Mouth/Throat: Mucous membranes are moist.     External Ears [x] Normal  [] Abnormal-     Neck: [x] No visualized mass     Pulmonary/Chest: [x] Respiratory effort normal.  [x] No visualized signs of difficulty breathing or respiratory distress        [] Abnormal-      Musculoskeletal:   [x] Normal gait with no signs of

## 2024-01-26 RX ORDER — ATORVASTATIN CALCIUM 20 MG/1
20 TABLET, FILM COATED ORAL DAILY
Qty: 90 TABLET | Refills: 1 | Status: SHIPPED | OUTPATIENT
Start: 2024-01-26

## 2024-05-29 ENCOUNTER — HOSPITAL ENCOUNTER (EMERGENCY)
Age: 59
Discharge: HOME | End: 2024-05-29
Payer: COMMERCIAL

## 2024-05-29 ENCOUNTER — TELEPHONE (OUTPATIENT)
Dept: ORTHOPEDIC SURGERY | Age: 59
End: 2024-05-29

## 2024-05-29 ENCOUNTER — APPOINTMENT (OUTPATIENT)
Dept: GENERAL RADIOLOGY | Age: 59
End: 2024-05-29
Payer: COMMERCIAL

## 2024-05-29 VITALS
OXYGEN SATURATION: 96 % | RESPIRATION RATE: 18 BRPM | HEART RATE: 88 BPM | SYSTOLIC BLOOD PRESSURE: 144 MMHG | WEIGHT: 175 LBS | DIASTOLIC BLOOD PRESSURE: 92 MMHG | BODY MASS INDEX: 29.88 KG/M2 | TEMPERATURE: 98.5 F | HEIGHT: 64 IN

## 2024-05-29 DIAGNOSIS — M18.11 OSTEOARTHRITIS OF CARPOMETACARPAL (CMC) JOINT OF RIGHT THUMB, UNSPECIFIED OSTEOARTHRITIS TYPE: ICD-10-CM

## 2024-05-29 DIAGNOSIS — M79.644 PAIN OF RIGHT THUMB: Primary | ICD-10-CM

## 2024-05-29 LAB
ANION GAP SERPL CALCULATED.3IONS-SCNC: 8 MMOL/L (ref 3–16)
BASOPHILS # BLD: 0 K/UL (ref 0–0.2)
BASOPHILS NFR BLD: 0.4 %
BUN SERPL-MCNC: 19 MG/DL (ref 7–20)
CALCIUM SERPL-MCNC: 9.1 MG/DL (ref 8.3–10.6)
CHLORIDE SERPL-SCNC: 104 MMOL/L (ref 99–110)
CO2 SERPL-SCNC: 26 MMOL/L (ref 21–32)
CREAT SERPL-MCNC: <0.5 MG/DL (ref 0.6–1.2)
CRP SERPL-MCNC: <3 MG/L (ref 0–5.1)
DEPRECATED RDW RBC AUTO: 12.4 % (ref 12.4–15.4)
EOSINOPHIL # BLD: 0.1 K/UL (ref 0–0.6)
EOSINOPHIL NFR BLD: 1.7 %
ERYTHROCYTE [SEDIMENTATION RATE] IN BLOOD BY WESTERGREN METHOD: 1 MM/HR (ref 0–30)
GFR SERPLBLD CREATININE-BSD FMLA CKD-EPI: >90 ML/MIN/{1.73_M2}
GLUCOSE SERPL-MCNC: 97 MG/DL (ref 70–99)
HCT VFR BLD AUTO: 41.9 % (ref 36–48)
HGB BLD-MCNC: 14.4 G/DL (ref 12–16)
LYMPHOCYTES # BLD: 1.5 K/UL (ref 1–5.1)
LYMPHOCYTES NFR BLD: 18.1 %
MCH RBC QN AUTO: 31.7 PG (ref 26–34)
MCHC RBC AUTO-ENTMCNC: 34.3 G/DL (ref 31–36)
MCV RBC AUTO: 92.3 FL (ref 80–100)
MONOCYTES # BLD: 0.6 K/UL (ref 0–1.3)
MONOCYTES NFR BLD: 7.2 %
NEUTROPHILS # BLD: 6.2 K/UL (ref 1.7–7.7)
NEUTROPHILS NFR BLD: 72.6 %
PLATELET # BLD AUTO: 169 K/UL (ref 135–450)
PMV BLD AUTO: 9.9 FL (ref 5–10.5)
POTASSIUM SERPL-SCNC: 3.8 MMOL/L (ref 3.5–5.1)
RBC # BLD AUTO: 4.54 M/UL (ref 4–5.2)
SODIUM SERPL-SCNC: 138 MMOL/L (ref 136–145)
URATE SERPL-MCNC: 5 MG/DL (ref 2.6–6)
WBC # BLD AUTO: 8.5 K/UL (ref 4–11)

## 2024-05-29 PROCEDURE — 6370000000 HC RX 637 (ALT 250 FOR IP): Performed by: PHYSICIAN ASSISTANT

## 2024-05-29 PROCEDURE — 99284 EMERGENCY DEPT VISIT MOD MDM: CPT

## 2024-05-29 PROCEDURE — 85652 RBC SED RATE AUTOMATED: CPT

## 2024-05-29 PROCEDURE — 84550 ASSAY OF BLOOD/URIC ACID: CPT

## 2024-05-29 PROCEDURE — 85025 COMPLETE CBC W/AUTO DIFF WBC: CPT

## 2024-05-29 PROCEDURE — 73140 X-RAY EXAM OF FINGER(S): CPT

## 2024-05-29 PROCEDURE — 80048 BASIC METABOLIC PNL TOTAL CA: CPT

## 2024-05-29 PROCEDURE — 86140 C-REACTIVE PROTEIN: CPT

## 2024-05-29 RX ORDER — PREDNISONE 20 MG/1
40 TABLET ORAL ONCE
Status: COMPLETED | OUTPATIENT
Start: 2024-05-29 | End: 2024-05-29

## 2024-05-29 RX ORDER — HYDROCODONE BITARTRATE AND ACETAMINOPHEN 5; 325 MG/1; MG/1
1 TABLET ORAL ONCE
Status: COMPLETED | OUTPATIENT
Start: 2024-05-29 | End: 2024-05-29

## 2024-05-29 RX ADMIN — HYDROCODONE BITARTRATE AND ACETAMINOPHEN 1 TABLET: 5; 325 TABLET ORAL at 12:59

## 2024-05-29 RX ADMIN — PREDNISONE 40 MG: 20 TABLET ORAL at 12:59

## 2024-05-29 ASSESSMENT — PAIN - FUNCTIONAL ASSESSMENT: PAIN_FUNCTIONAL_ASSESSMENT: 0-10

## 2024-05-29 ASSESSMENT — PAIN SCALES - GENERAL: PAINLEVEL_OUTOF10: 4

## 2024-05-29 ASSESSMENT — PAIN DESCRIPTION - PAIN TYPE: TYPE: ACUTE PAIN

## 2024-05-29 ASSESSMENT — PAIN DESCRIPTION - LOCATION: LOCATION: HAND

## 2024-05-29 ASSESSMENT — PAIN DESCRIPTION - ORIENTATION: ORIENTATION: RIGHT

## 2024-05-29 NOTE — ED NOTES
Called lab to check on last two processing labs. The machine is being QC now and should be run momentarily.

## 2024-05-29 NOTE — ED PROVIDER NOTES
valve regurgitation  Records reviewed: None  Disposition considerations/Plan: Patient here with acute right thumb pain, swelling, faint overlying redness.  She has an x-ray that shows advanced osteoarthritis though no other bony abnormality.  All labs are normal including normal white count, normal inflammatory markers, normal uric acid.  At this point she will be placed in a thumb spica splint.  I recommend orthopedic follow-up.  Not seen indication for antibiotics or necessarily steroids.  Recommended regular NSAID use including ibuprofen 600 mg 3 times daily or over-the-counter naproxen 2 tablets twice daily.      CLINICAL IMPRESSION:  1. Pain of right thumb    2. Osteoarthritis of carpometacarpal (CMC) joint of right thumb, unspecified osteoarthritis type        Blood pressure (!) 144/92, pulse 88, temperature 98.5 °F (36.9 °C), temperature source Oral, resp. rate 18, height 1.626 m (5' 4\"), weight 79.4 kg (175 lb), SpO2 96 %, not currently breastfeeding.          PATIENT REFERRED TO:  Frederick Meza MD  [address] [facility] [id number] [phone]              DISPOSITION  Patient was discharged to home in good condition.          Konrad Cervantes PA  05/29/24 [id number]

## 2024-05-29 NOTE — TELEPHONE ENCOUNTER
I have left a voicemail fo rthe patient. I requested some more information about the thumb. If its hot and swollen, I recommend the patient go to the ER for medical management.     I also offered to place a referral to a hand specialist if she would like to be seen for her thumb for medical management as well.     Please have the patient reschedule with hand if she has issues with her thumb.

## 2024-05-30 ENCOUNTER — TELEPHONE (OUTPATIENT)
Dept: ORTHOPEDIC SURGERY | Age: 59
End: 2024-05-30

## 2024-05-30 NOTE — TELEPHONE ENCOUNTER
Did leave message regarding ED referral for an appointment. Upon return call please schedule with Dr. Meza.

## 2024-06-05 ENCOUNTER — OFFICE VISIT (OUTPATIENT)
Dept: ORTHOPEDIC SURGERY | Age: 59
End: 2024-06-05
Payer: COMMERCIAL

## 2024-06-05 VITALS — RESPIRATION RATE: 16 BRPM | HEIGHT: 64 IN | WEIGHT: 183 LBS | BODY MASS INDEX: 31.24 KG/M2

## 2024-06-05 DIAGNOSIS — M25.541 ARTHRALGIA OF RIGHT HAND: ICD-10-CM

## 2024-06-05 DIAGNOSIS — M25.541 ARTHRALGIA OF RIGHT HAND: Primary | ICD-10-CM

## 2024-06-05 PROCEDURE — 99203 OFFICE O/P NEW LOW 30 MIN: CPT | Performed by: ORTHOPAEDIC SURGERY

## 2024-06-05 NOTE — PROGRESS NOTES
CHIEF COMPLAINT: Right hand pain    History:   Ms. Wong 59 y.o. right handed female presents today for first visit for evaluation of a right hand pain.  The patient was referred by [facility].  This is evaluated as a personal. The pain began 8 days ago. She rates pain at 2/10.   There was not a history of injury.  She states that she woke up and there was pain and swelling at her thumb IP joint.  This then progressed to pain and swelling at her thumb MCP joint.  She was seen at [facility] on 2024.  She was given a dose of prednisone there.  She states this helped immensely and resolved her symptoms.  She now has discomfort at her thumb CMC joint.  She has had some pain here in the past.  The patient has taken NSAIDs, ibuprofen with relief.  She has used ice with relief.  She was given a thumb spica brace, which she does not feel like has helped as much.  She does note that sometimes she has woken up in the middle night with polyarticular joint pain  The patient's occupation is .      Past Medical History:   Diagnosis Date    GERD (gastroesophageal reflux disease) ?    History of palpitations     Hyperlipidemia     Hypothyroidism     Moderate mitral valve regurgitation     Rash     Thyroid disease     TMJ dysfunction     Wears contact lenses     has glasses       Past Surgical History:   Procedure Laterality Date    ABDOMEN SURGERY       SECTION      CHOLECYSTECTOMY, LAPAROSCOPIC  01/10/2011    COLONOSCOPY  2012    HERNIA REPAIR      HIP SURGERY Left 2022    EXCISION LEFT HIP LIPOMA performed by Alberto Armijo MD at [facility] OR    HYSTERECTOMY (CERVIX STATUS UNKNOWN)      HYSTERECTOMY, TOTAL ABDOMINAL (CERVIX REMOVED)      HYSTERECTOMY, VAGINAL      PARTIAL HYSTERECTOMY (CERVIX NOT REMOVED)      US THYROID BIOPSY  2021    US THYROID BIOPSY 2021 [facility] ULTRASOUND       Current Outpatient Medications on File Prior to Visit   Medication Sig Dispense

## 2024-06-06 LAB
ANA SER QL IA: NEGATIVE
RHEUMATOID FACT SER IA-ACNC: <10 IU/ML

## 2024-06-07 ENCOUNTER — TELEPHONE (OUTPATIENT)
Dept: ORTHOPEDIC SURGERY | Age: 59
End: 2024-06-07

## 2024-06-07 NOTE — TELEPHONE ENCOUNTER
S/W NNAMDI discussed her labs were normal including from a rheumatological standpoint per [facility]. Offered injection if she wishes to pursue this. She is a  and states she has a break coming up soon in which she plans to try resting in hopes this will improve. Asked that she contact our office if she has any questions or wishes to pursue the injection. Patient voiced understanding of the conversation and will contact the office with further questions or concerns.

## 2024-06-27 RX ORDER — ATORVASTATIN CALCIUM 20 MG/1
20 TABLET, FILM COATED ORAL DAILY
Qty: 30 TABLET | Refills: 1 | Status: SHIPPED | OUTPATIENT
Start: 2024-06-27

## 2024-06-27 NOTE — TELEPHONE ENCOUNTER
Last ov: 8/1/23 [facility]  Upcoming ov: no upcoming     Lipid- 7/18/23    Front- please call pt for yearly ov  [facility]

## 2024-07-08 SDOH — ECONOMIC STABILITY: FOOD INSECURITY: WITHIN THE PAST 12 MONTHS, THE FOOD YOU BOUGHT JUST DIDN'T LAST AND YOU DIDN'T HAVE MONEY TO GET MORE.: NEVER TRUE

## 2024-07-08 SDOH — ECONOMIC STABILITY: FOOD INSECURITY: WITHIN THE PAST 12 MONTHS, YOU WORRIED THAT YOUR FOOD WOULD RUN OUT BEFORE YOU GOT MONEY TO BUY MORE.: NEVER TRUE

## 2024-07-08 SDOH — ECONOMIC STABILITY: HOUSING INSECURITY
IN THE LAST 12 MONTHS, WAS THERE A TIME WHEN YOU DID NOT HAVE A STEADY PLACE TO SLEEP OR SLEPT IN A SHELTER (INCLUDING NOW)?: NO

## 2024-07-08 SDOH — ECONOMIC STABILITY: INCOME INSECURITY: HOW HARD IS IT FOR YOU TO PAY FOR THE VERY BASICS LIKE FOOD, HOUSING, MEDICAL CARE, AND HEATING?: NOT VERY HARD

## 2024-07-08 SDOH — ECONOMIC STABILITY: TRANSPORTATION INSECURITY
IN THE PAST 12 MONTHS, HAS LACK OF TRANSPORTATION KEPT YOU FROM MEETINGS, WORK, OR FROM GETTING THINGS NEEDED FOR DAILY LIVING?: NO

## 2024-07-09 ENCOUNTER — OFFICE VISIT (OUTPATIENT)
Dept: FAMILY MEDICINE CLINIC | Age: 59
End: 2024-07-09
Payer: COMMERCIAL

## 2024-07-09 VITALS
HEIGHT: 64 IN | WEIGHT: 179.6 LBS | OXYGEN SATURATION: 96 % | BODY MASS INDEX: 30.66 KG/M2 | SYSTOLIC BLOOD PRESSURE: 122 MMHG | DIASTOLIC BLOOD PRESSURE: 72 MMHG | HEART RATE: 70 BPM

## 2024-07-09 DIAGNOSIS — E04.1 THYROID NODULE: ICD-10-CM

## 2024-07-09 DIAGNOSIS — M79.644 PAIN OF RIGHT THUMB: ICD-10-CM

## 2024-07-09 DIAGNOSIS — E78.5 HYPERLIPIDEMIA, UNSPECIFIED HYPERLIPIDEMIA TYPE: ICD-10-CM

## 2024-07-09 DIAGNOSIS — M25.521 RIGHT ELBOW PAIN: ICD-10-CM

## 2024-07-09 DIAGNOSIS — R53.83 OTHER FATIGUE: ICD-10-CM

## 2024-07-09 DIAGNOSIS — E03.9 HYPOTHYROIDISM, UNSPECIFIED TYPE: Primary | ICD-10-CM

## 2024-07-09 LAB
ALBUMIN SERPL-MCNC: 4.4 G/DL (ref 3.4–5)
ALBUMIN/GLOB SERPL: 1.8 {RATIO} (ref 1.1–2.2)
ALP SERPL-CCNC: 93 U/L (ref 40–129)
ALT SERPL-CCNC: 10 U/L (ref 10–40)
ANION GAP SERPL CALCULATED.3IONS-SCNC: 8 MMOL/L (ref 3–16)
AST SERPL-CCNC: 13 U/L (ref 15–37)
BILIRUB SERPL-MCNC: 0.5 MG/DL (ref 0–1)
BUN SERPL-MCNC: 11 MG/DL (ref 7–20)
CALCIUM SERPL-MCNC: 9.4 MG/DL (ref 8.3–10.6)
CHLORIDE SERPL-SCNC: 104 MMOL/L (ref 99–110)
CHOLEST SERPL-MCNC: 194 MG/DL (ref 0–199)
CO2 SERPL-SCNC: 28 MMOL/L (ref 21–32)
CREAT SERPL-MCNC: 0.5 MG/DL (ref 0.6–1.2)
FOLATE SERPL-MCNC: 9.25 NG/ML (ref 4.78–24.2)
GFR SERPLBLD CREATININE-BSD FMLA CKD-EPI: >90 ML/MIN/{1.73_M2}
GLUCOSE SERPL-MCNC: 90 MG/DL (ref 70–99)
HDLC SERPL-MCNC: 39 MG/DL (ref 40–60)
LDLC SERPL CALC-MCNC: 120 MG/DL
MAGNESIUM SERPL-MCNC: 2.4 MG/DL (ref 1.8–2.4)
POTASSIUM SERPL-SCNC: 4.4 MMOL/L (ref 3.5–5.1)
PROT SERPL-MCNC: 6.8 G/DL (ref 6.4–8.2)
SODIUM SERPL-SCNC: 140 MMOL/L (ref 136–145)
T4 FREE SERPL-MCNC: 1.5 NG/DL (ref 0.9–1.8)
TRIGL SERPL-MCNC: 173 MG/DL (ref 0–150)
TSH SERPL DL<=0.005 MIU/L-ACNC: 1.41 UIU/ML (ref 0.27–4.2)
VIT B12 SERPL-MCNC: 480 PG/ML (ref 211–911)
VLDLC SERPL CALC-MCNC: 35 MG/DL

## 2024-07-09 PROCEDURE — 99214 OFFICE O/P EST MOD 30 MIN: CPT | Performed by: FAMILY MEDICINE

## 2024-07-09 ASSESSMENT — ENCOUNTER SYMPTOMS: SHORTNESS OF BREATH: 0

## 2024-07-09 NOTE — PROGRESS NOTES
Chief Complaint   Patient presents with    Annual Exam       HPI:  Theresa Wong is a 59 y.o. (: 1965) here today   for annual exam.  HPI  Seen by ortho recently.  Had sig right thumb pain and swelling.  Sig pain noted.  Seen in ER.  Labs and xrays.  Told sig OA.  Still some numbness to thumb.  Pain and swelling improved.  Told not RA. Had labs done.      Has been having a lot of issues w/ diffuse joint pain.  Will wake up at night w/ pain. Some improvement w/ stopping drinking strawberry smoothies.      Has been having some pain to lateral right elbow.  Pain and tender to touch.  Has been seeing massage therapy.  Some weakness and pain to right arm at night.  Improves during the day.  No weakness.     Patient's medications, allergies, past medical, surgical, social and family histories were reviewed and updated as appropriate.    ROS:  Review of Systems   Constitutional:  Negative for fever.   Respiratory:  Negative for shortness of breath.    Musculoskeletal:  Positive for arthralgias.   Psychiatric/Behavioral:  Positive for sleep disturbance.            LDL Direct (mg/dL)   Date Value   2018 168 (H)       Past Medical History:   Diagnosis Date    GERD (gastroesophageal reflux disease) ?    History of palpitations     Hyperlipidemia     Hypothyroidism     Moderate mitral valve regurgitation     Rash     Thyroid disease     TMJ dysfunction     Wears contact lenses     has glasses       Family History   Problem Relation Age of Onset    Heart Disease Father     High Cholesterol Father     High Blood Pressure Mother     High Cholesterol Mother     Kidney Disease Mother         renal polycystic     Arthritis Mother     Early Death Paternal Aunt     Breast Cancer Sister     Endometrial Cancer Sister     Breast Cancer Maternal Grandmother     Breast Cancer Sister        Social History     Socioeconomic History    Marital status:      Spouse name: Not on file    Number of children: Not on file    Years

## 2024-07-10 NOTE — RESULT ENCOUNTER NOTE
Cmp normal.  Lipids sig improved from last check. We had discussed briefly stopping statin to see if joint pain improves.  If it does not, resume med. B12 low normal.  May benefit from otc b12 supplement. Thyroid labs and magnesium ok.

## 2024-08-02 NOTE — TELEPHONE ENCOUNTER
Last ov: 8/1/23 [facility]  Upcoming ov: no upcoming    Lipid- 7/9/24    Front please call pt for ov

## 2024-08-05 NOTE — TELEPHONE ENCOUNTER
8/5- called pt @[phone] [facility] informing pt to call back to schedule annual appt with [facility].

## 2024-08-12 RX ORDER — ATORVASTATIN CALCIUM 20 MG/1
20 TABLET, FILM COATED ORAL DAILY
Qty: 90 TABLET | Refills: 1 | OUTPATIENT
Start: 2024-08-12

## 2024-10-15 ENCOUNTER — HOSPITAL ENCOUNTER (OUTPATIENT)
Dept: WOMENS IMAGING | Age: 59
Discharge: HOME | End: 2024-10-15
Payer: COMMERCIAL

## 2024-10-15 VITALS — BODY MASS INDEX: 29.02 KG/M2 | HEIGHT: 64 IN | WEIGHT: 170 LBS

## 2024-10-15 DIAGNOSIS — Z12.31 SCREENING MAMMOGRAM FOR BREAST CANCER: ICD-10-CM

## 2024-10-15 PROCEDURE — 77063 BREAST TOMOSYNTHESIS BI: CPT

## 2024-10-24 RX ORDER — LEVOTHYROXINE SODIUM 88 UG/1
TABLET ORAL
Qty: 90 TABLET | Refills: 3 | Status: SHIPPED | OUTPATIENT
Start: 2024-10-24

## 2024-11-22 NOTE — PROGRESS NOTES
07/09/2024    BILITOT 0.5 07/09/2024     No results found for: \"LABA1C\"  Lipids:         Lab Results   Component Value Date    TRIG 173 (H) 07/09/2024    TRIG 277 (H) 07/18/2023    TRIG 280 (H) 05/24/2022            Lab Results   Component Value Date    HDL 39 (L) 07/09/2024    HDL 40 07/18/2023    HDL 44 05/24/2022            No components found for: \"LDLCALC\"           No components found for: \"LABVLDL\"        CARDIAC DATA     LAST EKG 11/25/24:  Sinus rhythm with frequent PACs, non-specific T wave abnormality    ECHO:   TTE 6/1/22:  Summary   Left ventricular systolic function is normal with ejection fraction   estimated at 55-60 %.   No regional wall motion abnormalities are noted.   Mild septal hypertrophy is present with normal thickness of remaining left   ventricular wall segments.   Normal left ventricular diastolic filling pressure.   Mild posterior mitral annular calcification is present.   Moderate mitral regurgitation.   Mild-to-moderate tricuspid regurgitation.   Normal systolic pulmonary artery pressure (SPAP) estimated at 30 mmHg (RA   pressure 3 mmHg).    TTE 8/25/23:  Summary   Normal left ventricular size, wall thickness, and systolic function with an   estimated ejection fraction of 55-60%.   No regional wall motion abnormalities are seen.   Normal left ventricular diastolic filling pressure.   Normal RV size and systolic function.   Moderate mitral regurgitation.   Mild pulmonic regurgitation.   Mild tricuspid regurgitation.   Systolic pulmonary artery pressure (SPAP) is normal and estimated at 29 mmHg   (right atrial pressure 3 mmHg).   Trivial pericardial effusion.    STRESS TEST:   Pharmacologic Nuclear SPECT Stress 6/1/22:  Summary    Normal LV function.    There is normal isotope uptake at stress and rest.   There is no evidence of  myocardial ischemia or scar.    Normal myocardial perfusion study.       CARDIAC CATH: N/A    OTHER DATA:  Cardiac Event Monitor 4/27 -

## 2024-11-25 ENCOUNTER — OFFICE VISIT (OUTPATIENT)
Dept: CARDIOLOGY CLINIC | Age: 59
End: 2024-11-25
Payer: COMMERCIAL

## 2024-11-25 VITALS
WEIGHT: 185.2 LBS | DIASTOLIC BLOOD PRESSURE: 68 MMHG | HEIGHT: 64 IN | BODY MASS INDEX: 31.62 KG/M2 | OXYGEN SATURATION: 95 % | HEART RATE: 72 BPM | SYSTOLIC BLOOD PRESSURE: 116 MMHG

## 2024-11-25 DIAGNOSIS — E78.2 MIXED HYPERLIPIDEMIA: ICD-10-CM

## 2024-11-25 DIAGNOSIS — I49.1 PAC (PREMATURE ATRIAL CONTRACTION): ICD-10-CM

## 2024-11-25 DIAGNOSIS — R00.2 PALPITATIONS: ICD-10-CM

## 2024-11-25 DIAGNOSIS — R07.89 ATYPICAL CHEST PAIN: Primary | ICD-10-CM

## 2024-11-25 DIAGNOSIS — E03.9 HYPOTHYROIDISM, UNSPECIFIED TYPE: ICD-10-CM

## 2024-11-25 DIAGNOSIS — I49.3 PVC (PREMATURE VENTRICULAR CONTRACTION): ICD-10-CM

## 2024-11-25 DIAGNOSIS — Z82.49 FAMILY HISTORY OF CORONARY ARTERY DISEASE: ICD-10-CM

## 2024-11-25 DIAGNOSIS — Z78.9 STATIN INTOLERANCE: ICD-10-CM

## 2024-11-25 DIAGNOSIS — Z01.818 PRE-OP EVALUATION: ICD-10-CM

## 2024-11-25 DIAGNOSIS — I34.0 MITRAL VALVE INSUFFICIENCY, UNSPECIFIED ETIOLOGY: ICD-10-CM

## 2024-11-25 PROCEDURE — 93000 ELECTROCARDIOGRAM COMPLETE: CPT | Performed by: INTERNAL MEDICINE

## 2024-11-25 PROCEDURE — 99214 OFFICE O/P EST MOD 30 MIN: CPT | Performed by: INTERNAL MEDICINE

## 2024-11-25 NOTE — PATIENT INSTRUCTIONS
1. Did not tolerate statin medications due to severe myalgias. Recommend PCSK9 inhibitor Praluent or Repatha. Also discussed Zetia, but knowing that it will not get your cholesterol to goal.   2. Will repeat Lipid panel prior to sending PCSK9 inhibitor   3. Will repeat TTE to monitor for progression of mitral regurgitation.  Based on exam, doubt that this has progressed significantly.  4. If TTE is stable, will be low risk for surgery. - will send this letter after repeating TTE and EKG   5. Continue to encourage heart healthy, low sodium diet and regular physical exercise for at least 30 minutes a minimum of 3-5 times per week.   6. EKG today    7. Follow-up with me in 6 months.      Your provider has ordered testing for further evaluation.  An order/prescription has been included in your paper work.   To schedule outpatient testing, contact Central Scheduling by calling GREG ([phone]).

## 2024-11-26 ENCOUNTER — LAB (OUTPATIENT)
Dept: FAMILY MEDICINE CLINIC | Age: 59
End: 2024-11-26
Payer: COMMERCIAL

## 2024-11-26 DIAGNOSIS — E78.2 MIXED HYPERLIPIDEMIA: ICD-10-CM

## 2024-11-26 DIAGNOSIS — Z78.9 STATIN INTOLERANCE: ICD-10-CM

## 2024-11-26 LAB
CHOLEST SERPL-MCNC: 251 MG/DL (ref 0–199)
HDLC SERPL-MCNC: 35 MG/DL (ref 40–60)
LDLC SERPL CALC-MCNC: ABNORMAL MG/DL
LDLC SERPL-MCNC: 143 MG/DL
TRIGL SERPL-MCNC: 341 MG/DL (ref 0–150)
VLDLC SERPL CALC-MCNC: ABNORMAL MG/DL

## 2024-11-26 PROCEDURE — 36415 COLL VENOUS BLD VENIPUNCTURE: CPT | Performed by: INTERNAL MEDICINE

## 2024-11-26 NOTE — PROGRESS NOTES
Blood drawn per order.  Needle size: 21 g  Site: L Antecubital.  First attempt successful Yes    Second attempt no    Pressure applied until bleeding stopped.  Pressure applied.    Patient informed to call office or return if bleeding reoccurs and unable to stop.    Tubes drawn: 1 purple     1 red

## 2024-11-27 ENCOUNTER — HOSPITAL ENCOUNTER (OUTPATIENT)
Dept: ULTRASOUND IMAGING | Age: 59
Discharge: HOME | End: 2024-11-27
Payer: COMMERCIAL

## 2024-11-27 DIAGNOSIS — E04.1 THYROID NODULE: ICD-10-CM

## 2024-11-27 PROCEDURE — 76536 US EXAM OF HEAD AND NECK: CPT

## 2024-11-27 NOTE — RESULT ENCOUNTER NOTE
Hypervascular thyroid gland.  Mild enlargement of nodule noted.  Prior biopsy.  Benign.  Given some increase in size, would recommend follow-up with ENT.  Thyroid labs were okay in July

## 2024-11-27 NOTE — TELEPHONE ENCOUNTER
----- Message from MUKUND ADRIAN MA sent at 11/27/2024 11:32 AM EST -----  Pt returned call, relayed message, she v/u. Would you like us to start the approval for repatha?

## 2024-12-02 NOTE — TELEPHONE ENCOUNTER
Alvaro Matson, DO  12/2/2024  9:40 AM EST Back to Top      Yes, please start process to attempt to get approved for Repatha.  Thanks.

## 2024-12-10 ENCOUNTER — OFFICE VISIT (OUTPATIENT)
Dept: ENT CLINIC | Age: 59
End: 2024-12-10
Payer: COMMERCIAL

## 2024-12-10 VITALS
DIASTOLIC BLOOD PRESSURE: 85 MMHG | SYSTOLIC BLOOD PRESSURE: 131 MMHG | BODY MASS INDEX: 30.62 KG/M2 | WEIGHT: 183.8 LBS | HEART RATE: 88 BPM | HEIGHT: 65 IN | TEMPERATURE: 98.1 F

## 2024-12-10 DIAGNOSIS — R49.9 HOARSENESS OR CHANGING VOICE: ICD-10-CM

## 2024-12-10 DIAGNOSIS — E07.9 THYROID MASS: Primary | ICD-10-CM

## 2024-12-10 PROCEDURE — 99212 OFFICE O/P EST SF 10 MIN: CPT | Performed by: OTOLARYNGOLOGY

## 2024-12-10 PROCEDURE — 31575 DIAGNOSTIC LARYNGOSCOPY: CPT | Performed by: OTOLARYNGOLOGY

## 2024-12-10 NOTE — PROGRESS NOTES
FOLLOW UP VISIT:    CHIEF COMPLAINT: Thyroid nodule    INTERIM HISTORY: Seen by me in 2021 with ultrasound finding of a mass in the left thyroid lobe.  It was 3 cm in size.  Fine-needle aspiration was read as benign.  She had a thyroid ultrasound again 2 weeks ago and the mass had grown slightly.  She has no airway concerns but she has noticed a change in her voice.      PAST MEDICAL HISTORY:   Social History     Tobacco Use   Smoking Status Former    Current packs/day: 0.00    Average packs/day: 1 pack/day for 15.0 years (15.0 ttl pk-yrs)    Types: Cigarettes    Start date: 1976    Quit date: 1991    Years since quittin.9   Smokeless Tobacco Never                                                    Social History     Substance and Sexual Activity   Alcohol Use No                                                    Current Outpatient Medications:     Evolocumab 140 MG/ML SOAJ, Inject 140 mg into the skin every 14 days, Disp: 1 Adjustable Dose Pre-filled Pen Syringe, Rfl: 11    levothyroxine (SYNTHROID) 88 MCG tablet, TAKE 1 TABLET BY MOUTH EVERY DAY, Disp: 90 tablet, Rfl: 3    albuterol sulfate HFA (PROAIR HFA) 108 (90 Base) MCG/ACT inhaler, Inhale 2 puffs into the lungs every 6 hours as needed for Wheezing, Disp: 18 g, Rfl: 3    estradiol (ESTRACE) 1 MG tablet, Take 0.5 tablets by mouth daily, Disp: , Rfl:     Cholecalciferol (VITAMIN D3 PO), Take 1 capsule by mouth Daily, Disp: , Rfl:                                                  Past Medical History:   Diagnosis Date    GERD (gastroesophageal reflux disease) ?    History of palpitations     Hyperlipidemia     Hypothyroidism     Moderate mitral valve regurgitation     Rash     Thyroid disease     TMJ dysfunction     Wears contact lenses     has glasses                                                    Past Surgical History:   Procedure Laterality Date    ABDOMEN SURGERY       SECTION      CHOLECYSTECTOMY, LAPAROSCOPIC  01/10/2011

## 2024-12-18 ENCOUNTER — OFFICE VISIT (OUTPATIENT)
Dept: FAMILY MEDICINE CLINIC | Age: 59
End: 2024-12-18
Payer: COMMERCIAL

## 2024-12-18 VITALS
BODY MASS INDEX: 31.24 KG/M2 | HEART RATE: 76 BPM | DIASTOLIC BLOOD PRESSURE: 84 MMHG | OXYGEN SATURATION: 95 % | WEIGHT: 183 LBS | SYSTOLIC BLOOD PRESSURE: 124 MMHG | HEIGHT: 64 IN

## 2024-12-18 DIAGNOSIS — E03.9 HYPOTHYROIDISM, UNSPECIFIED TYPE: ICD-10-CM

## 2024-12-18 DIAGNOSIS — I34.0 MITRAL VALVE INSUFFICIENCY, UNSPECIFIED ETIOLOGY: ICD-10-CM

## 2024-12-18 DIAGNOSIS — Z01.818 PREOP EXAMINATION: Primary | ICD-10-CM

## 2024-12-18 DIAGNOSIS — M79.644 PAIN OF RIGHT THUMB: ICD-10-CM

## 2024-12-18 DIAGNOSIS — E78.5 HYPERLIPIDEMIA, UNSPECIFIED HYPERLIPIDEMIA TYPE: ICD-10-CM

## 2024-12-18 DIAGNOSIS — E04.1 THYROID NODULE: ICD-10-CM

## 2024-12-18 PROCEDURE — 99213 OFFICE O/P EST LOW 20 MIN: CPT | Performed by: FAMILY MEDICINE

## 2024-12-18 ASSESSMENT — ENCOUNTER SYMPTOMS: SHORTNESS OF BREATH: 0

## 2024-12-18 NOTE — PROGRESS NOTES
Theresa Wong  YOB: 1965    Date of Service:  12/18/2024      Wt Readings from Last 2 Encounters:   12/18/24 83 kg (183 lb)   12/16/24 83.9 kg (185 lb)       BP Readings from Last 3 Encounters:   12/18/24 124/84   12/16/24 116/68   12/10/24 131/85        Chief Complaint   Patient presents with    Pre-op Exam     Patient is having right thumb surgery on 12/23/24 by Dr. Grace at [facility].       Allergies   Allergen Reactions    Statins Myalgia       Outpatient Medications Marked as Taking for the 12/18/24 encounter (Office Visit) with Sunil Hatfield MD   Medication Sig Dispense Refill    levothyroxine (SYNTHROID) 88 MCG tablet TAKE 1 TABLET BY MOUTH EVERY DAY 90 tablet 3    albuterol sulfate HFA (PROAIR HFA) 108 (90 Base) MCG/ACT inhaler Inhale 2 puffs into the lungs every 6 hours as needed for Wheezing 18 g 3    estradiol (ESTRACE) 1 MG tablet Take 0.5 tablets by mouth daily      Cholecalciferol (VITAMIN D3 PO) Take 1 capsule by mouth Daily         This patient presents to the office today for a preoperative consultation at the request of surgeon, Dr. Grace, who plans on performing right thumb surgery on December 23 at [facility].  The current problem began 2-3 years ago, and symptoms have been worsening with time.  Conservative therapy: Yes: massage therapy, prior injection, which has been ineffective..  Sig thumb pain has impacted job, others.    Planned anesthesia: General   Known anesthesia problems: None   Bleeding risk: No recent or remote history of abnormal bleeding  Personal or FH of DVT/PE: No    Patient objection to receiving blood products: No    Past Medical History:   Diagnosis Date    GERD (gastroesophageal reflux disease) ?    History of palpitations     Hyperlipidemia     Hypothyroidism     Moderate mitral valve regurgitation     Rash     Thyroid disease     TMJ dysfunction     Wears contact lenses     has glasses

## 2024-12-19 ENCOUNTER — TELEPHONE (OUTPATIENT)
Dept: CARDIOLOGY CLINIC | Age: 59
End: 2024-12-19

## 2024-12-19 NOTE — TELEPHONE ENCOUNTER
Patient would be considered low risk for an adverse perioperative cardiovascular event in the setting of an intermediate risk surgical procedure.  No additional cardiac evaluation is currently indicated prior to proceeding with her planned procedure.

## 2024-12-19 NOTE — TELEPHONE ENCOUNTER
Barbara from [facility] wants to know if pt can be cleared for surgery. Pt had echo done as well. Please advise    CARDIAC CLEARANCE REQUEST    What type of procedure are you having:  Wrist surgery  Are you taking any blood thinners:  N/a  Type on anesthesia:  General  When is your procedure scheduled for:  12.23.2024  What physician is performing your procedure:  Dr. Grace  Phone Number:  [phone]  Fax number to send the letter:   [phone]

## 2024-12-27 ENCOUNTER — PROCEDURE VISIT (OUTPATIENT)
Dept: ENT CLINIC | Age: 59
End: 2024-12-27

## 2024-12-27 VITALS
HEART RATE: 80 BPM | DIASTOLIC BLOOD PRESSURE: 86 MMHG | SYSTOLIC BLOOD PRESSURE: 136 MMHG | BODY MASS INDEX: 31.24 KG/M2 | WEIGHT: 183 LBS | HEIGHT: 64 IN

## 2024-12-27 DIAGNOSIS — E04.1 THYROID NODULE: Primary | ICD-10-CM

## 2024-12-27 RX ORDER — LIDOCAINE HYDROCHLORIDE AND EPINEPHRINE 10; 10 MG/ML; UG/ML
2.5 INJECTION, SOLUTION INFILTRATION; PERINEURAL ONCE
Status: COMPLETED | OUTPATIENT
Start: 2024-12-27 | End: 2024-12-27

## 2024-12-27 RX ADMIN — LIDOCAINE HYDROCHLORIDE AND EPINEPHRINE 2.5 ML: 10; 10 INJECTION, SOLUTION INFILTRATION; PERINEURAL at 10:46

## 2024-12-27 NOTE — PROGRESS NOTES
Pt placed on CPAP 7, PS 10, .30 for weaning. US Thyroid FNA  Consent: The procedure, with its potential risks and complications, was discussed with the patient, including the option of not performing the procedure. Verbal and written consent was obtained.  Time-out: A time-out was observed to confirm the correct patient, procedure, and site.  Localization: With the patient in supine position, the target nodule(s) was(were) identified by ultrasound. A skin site was marked, and the skin was prepped and draped in usual sterile fashion.  Location: left  Needle(s) 23 Ga     Clinical information  59-year-old female with 3.8 cm TR 3 nodule    Comparison  Ultrasound 11/27/2024    Findings  Number of passes: 3 at each site   Adequacy: Visual confirmation on US. Cytopathology to determine adequacy upon review of specimen.   Complications: None.     Impression  Ultrasound-guided fine needle aspiration of thyroid nodule(s).       Understands potential for nondiagnostic biopsy.

## 2024-12-31 ENCOUNTER — TELEPHONE (OUTPATIENT)
Dept: ENT CLINIC | Age: 59
End: 2024-12-31

## 2024-12-31 DIAGNOSIS — E04.1 THYROID NODULE: Primary | ICD-10-CM

## 2024-12-31 NOTE — TELEPHONE ENCOUNTER
Please let Theresa know that the thyroid biopsy came back as benign.  Plan for repeat ultrasound in 1 year. Order has been placed

## 2025-04-25 ENCOUNTER — OFFICE VISIT (OUTPATIENT)
Dept: FAMILY MEDICINE CLINIC | Age: 60
End: 2025-04-25
Payer: COMMERCIAL

## 2025-04-25 VITALS
HEART RATE: 86 BPM | DIASTOLIC BLOOD PRESSURE: 84 MMHG | SYSTOLIC BLOOD PRESSURE: 138 MMHG | WEIGHT: 182 LBS | HEIGHT: 64 IN | OXYGEN SATURATION: 96 % | BODY MASS INDEX: 31.07 KG/M2

## 2025-04-25 DIAGNOSIS — L03.012 PARONYCHIA OF LEFT THUMB: Primary | ICD-10-CM

## 2025-04-25 DIAGNOSIS — E78.5 HYPERLIPIDEMIA, UNSPECIFIED HYPERLIPIDEMIA TYPE: ICD-10-CM

## 2025-04-25 LAB
ALBUMIN SERPL-MCNC: 4.3 G/DL (ref 3.4–5)
ALBUMIN/GLOB SERPL: 1.7 {RATIO} (ref 1.1–2.2)
ALP SERPL-CCNC: 90 U/L (ref 40–129)
ALT SERPL-CCNC: 12 U/L (ref 10–40)
ANION GAP SERPL CALCULATED.3IONS-SCNC: 10 MMOL/L (ref 3–16)
AST SERPL-CCNC: 17 U/L (ref 15–37)
BILIRUB SERPL-MCNC: 0.4 MG/DL (ref 0–1)
BUN SERPL-MCNC: 11 MG/DL (ref 7–20)
CALCIUM SERPL-MCNC: 9.3 MG/DL (ref 8.3–10.6)
CHLORIDE SERPL-SCNC: 105 MMOL/L (ref 99–110)
CHOLEST SERPL-MCNC: 165 MG/DL (ref 0–199)
CO2 SERPL-SCNC: 25 MMOL/L (ref 21–32)
CREAT SERPL-MCNC: 0.6 MG/DL (ref 0.6–1.2)
GFR SERPLBLD CREATININE-BSD FMLA CKD-EPI: >90 ML/MIN/{1.73_M2}
GLUCOSE SERPL-MCNC: 94 MG/DL (ref 70–99)
HDLC SERPL-MCNC: 37 MG/DL (ref 40–60)
LDLC SERPL CALC-MCNC: ABNORMAL MG/DL
LDLC SERPL-MCNC: 87 MG/DL
POTASSIUM SERPL-SCNC: 4.1 MMOL/L (ref 3.5–5.1)
PROT SERPL-MCNC: 6.8 G/DL (ref 6.4–8.2)
SODIUM SERPL-SCNC: 140 MMOL/L (ref 136–145)
TRIGL SERPL-MCNC: 310 MG/DL (ref 0–150)
VLDLC SERPL CALC-MCNC: ABNORMAL MG/DL

## 2025-04-25 PROCEDURE — 99213 OFFICE O/P EST LOW 20 MIN: CPT | Performed by: FAMILY MEDICINE

## 2025-04-25 PROCEDURE — 36415 COLL VENOUS BLD VENIPUNCTURE: CPT | Performed by: FAMILY MEDICINE

## 2025-04-25 RX ORDER — DOXYCYCLINE HYCLATE 100 MG
100 TABLET ORAL 2 TIMES DAILY
Qty: 20 TABLET | Refills: 0 | Status: SHIPPED | OUTPATIENT
Start: 2025-04-25 | End: 2025-05-05

## 2025-04-25 RX ORDER — MUPIROCIN 20 MG/G
OINTMENT TOPICAL
Qty: 30 G | Refills: 0 | Status: SHIPPED | OUTPATIENT
Start: 2025-04-25 | End: 2025-05-02

## 2025-04-25 SDOH — ECONOMIC STABILITY: FOOD INSECURITY: WITHIN THE PAST 12 MONTHS, THE FOOD YOU BOUGHT JUST DIDN'T LAST AND YOU DIDN'T HAVE MONEY TO GET MORE.: NEVER TRUE

## 2025-04-25 SDOH — ECONOMIC STABILITY: FOOD INSECURITY: WITHIN THE PAST 12 MONTHS, YOU WORRIED THAT YOUR FOOD WOULD RUN OUT BEFORE YOU GOT MONEY TO BUY MORE.: NEVER TRUE

## 2025-04-25 ASSESSMENT — PATIENT HEALTH QUESTIONNAIRE - PHQ9
1. LITTLE INTEREST OR PLEASURE IN DOING THINGS: SEVERAL DAYS
SUM OF ALL RESPONSES TO PHQ QUESTIONS 1-9: 2
SUM OF ALL RESPONSES TO PHQ QUESTIONS 1-9: 2
2. FEELING DOWN, DEPRESSED OR HOPELESS: SEVERAL DAYS
SUM OF ALL RESPONSES TO PHQ QUESTIONS 1-9: 2
SUM OF ALL RESPONSES TO PHQ QUESTIONS 1-9: 2

## 2025-04-25 ASSESSMENT — ENCOUNTER SYMPTOMS: COLOR CHANGE: 1

## 2025-04-25 NOTE — PROGRESS NOTES
Chief Complaint   Patient presents with    Finger Pain     Pain and redness around left thumb nail       HPI:  Theresa Wong is a 60 y.o. (: 1965) here today   for pain and redness around left thumb nail.  HPI  Some redness and pain to base of left thumbnail.  Has been soaking in epsom salt.  Brief relief.  No drainage.  Tried peroxide.  No trauma.      Prior pain and weakness improved w/ off statin.  Injections causing some skin irritation.  Red, irritation.  Appt w/ cardio again next week.     Patient's medications, allergies, past medical, surgical, social and family histories were reviewed and updated as appropriate.    ROS:  Review of Systems   Constitutional:  Negative for fever.   Skin:  Positive for color change.           Prior to Visit Medications    Medication Sig Taking? Authorizing Provider   mupirocin (BACTROBAN) 2 % ointment Apply topically 3 times daily. Yes Sunil Hatfield MD   doxycycline hyclate (VIBRA-TABS) 100 MG tablet Take 1 tablet by mouth 2 times daily for 10 days Yes Sunil Hatfield MD   Evolocumab 140 MG/ML SOAJ Inject 140 mg into the skin every 14 days Yes Alvaro Matson,    levothyroxine (SYNTHROID) 88 MCG tablet TAKE 1 TABLET BY MOUTH EVERY DAY Yes Sunil Hatfield MD   estradiol (ESTRACE) 1 MG tablet Take 0.5 tablets by mouth daily Yes ProviderEmber MD   Cholecalciferol (VITAMIN D3 PO) Take 1 capsule by mouth Daily Yes ProviderEmber MD       Allergies   Allergen Reactions    Statins Myalgia       OBJECTIVE:    /84   Pulse 86   Ht 1.626 m (5' 4\")   Wt 82.6 kg (182 lb)   SpO2 96%   BMI 31.24 kg/m²     BP Readings from Last 2 Encounters:   25 138/84   24 136/86       Wt Readings from Last 3 Encounters:   25 82.6 kg (182 lb)   24 83 kg (183 lb)   24 83 kg (183 lb)       Physical Exam  Constitutional:       Appearance: Normal appearance.   HENT:      Head: Normocephalic and atraumatic.   Pulmonary:      Effort: Pulmonary

## 2025-04-28 ENCOUNTER — RESULTS FOLLOW-UP (OUTPATIENT)
Dept: FAMILY MEDICINE CLINIC | Age: 60
End: 2025-04-28

## 2025-04-28 NOTE — RESULT ENCOUNTER NOTE
Total cholesterol significantly improved from last check.  Triglycerides also slightly improved, but still above goal.  LDL cholesterol substantially improved.  CMP essentially normal.  Follow-up with cardiology as scheduled.

## 2025-07-17 NOTE — TELEPHONE ENCOUNTER
Patient transported to room 239 via bed.  Bedside report of procedure and plan of care discussed with RN.  Questions encouraged and answered.  RN in agreement with plan.  Assessment of dressing to back site: CDI.    Dressings may be removed after 24 hours; once removed, you may shower.  Do not soak in a bath, hot tub or pool for approximately one week to allow for healing of the incision/wound      Any questions, please call Radiology Nursing at [phone] extension 2.        Pt seen Friday and she is still coughing up really thick green sputum. Is there anything she can take to help get it up or thin it out. She states she does feel a little better though except the cough.

## (undated) DEVICE — SOLUTION IV IRRIG 500ML 0.9% SODIUM CHL 2F7123

## (undated) DEVICE — GLOVE ORANGE PI 8   MSG9080

## (undated) DEVICE — STERILE POLYISOPRENE POWDER-FREE SURGICAL GLOVES: Brand: PROTEXIS

## (undated) DEVICE — SUTURE MCRYL SZ 0 L18IN ABSRB VLT L36MM CT-1 1/2 CIR Y740D

## (undated) DEVICE — GOWN,REINFORCED,POLY,AURORA,XLARGE,STRL: Brand: MEDLINE

## (undated) DEVICE — PACK PROCEDURE SURG EXTREMITY SORGER CDS